# Patient Record
Sex: FEMALE | Race: WHITE | Employment: OTHER | URBAN - METROPOLITAN AREA
[De-identification: names, ages, dates, MRNs, and addresses within clinical notes are randomized per-mention and may not be internally consistent; named-entity substitution may affect disease eponyms.]

---

## 2018-06-25 ENCOUNTER — HOSPITAL ENCOUNTER (OUTPATIENT)
Dept: GENERAL RADIOLOGY | Age: 78
Discharge: HOME | End: 2018-06-27
Payer: MEDICARE

## 2018-06-25 ENCOUNTER — HOSPITAL ENCOUNTER (OUTPATIENT)
Age: 78
Discharge: HOME | End: 2018-06-27
Payer: MEDICARE

## 2018-06-25 DIAGNOSIS — M79.672 LEFT FOOT PAIN: ICD-10-CM

## 2018-06-25 DIAGNOSIS — R60.0 LOCALIZED EDEMA: ICD-10-CM

## 2018-06-25 PROCEDURE — 73610 X-RAY EXAM OF ANKLE: CPT

## 2018-06-25 PROCEDURE — 73630 X-RAY EXAM OF FOOT: CPT

## 2018-11-15 ENCOUNTER — ANESTHESIA (OUTPATIENT)
Dept: OPERATING ROOM | Age: 78
End: 2018-11-15
Payer: MEDICARE

## 2018-11-15 ENCOUNTER — ANESTHESIA EVENT (OUTPATIENT)
Dept: OPERATING ROOM | Age: 78
End: 2018-11-15
Payer: MEDICARE

## 2018-11-15 ENCOUNTER — HOSPITAL ENCOUNTER (OUTPATIENT)
Age: 78
Setting detail: OUTPATIENT SURGERY
Discharge: HOME | End: 2018-11-15
Attending: OPHTHALMOLOGY | Admitting: OPHTHALMOLOGY
Payer: MEDICARE

## 2018-11-15 VITALS
HEIGHT: 65 IN | WEIGHT: 293 LBS | RESPIRATION RATE: 18 BRPM | DIASTOLIC BLOOD PRESSURE: 66 MMHG | HEART RATE: 59 BPM | SYSTOLIC BLOOD PRESSURE: 154 MMHG | OXYGEN SATURATION: 95 % | TEMPERATURE: 97.6 F | BODY MASS INDEX: 48.82 KG/M2

## 2018-11-15 VITALS — OXYGEN SATURATION: 94 % | SYSTOLIC BLOOD PRESSURE: 150 MMHG | DIASTOLIC BLOOD PRESSURE: 73 MMHG

## 2018-11-15 PROCEDURE — 3600000002 HC SURGERY LEVEL 2 BASE: Performed by: OPHTHALMOLOGY

## 2018-11-15 PROCEDURE — 6360000002 HC RX W HCPCS: Performed by: OPHTHALMOLOGY

## 2018-11-15 PROCEDURE — 6360000002 HC RX W HCPCS: Performed by: NURSE ANESTHETIST, CERTIFIED REGISTERED

## 2018-11-15 PROCEDURE — 6370000000 HC RX 637 (ALT 250 FOR IP)

## 2018-11-15 PROCEDURE — 6370000000 HC RX 637 (ALT 250 FOR IP): Performed by: OPHTHALMOLOGY

## 2018-11-15 PROCEDURE — 2709999900 HC NON-CHARGEABLE SUPPLY: Performed by: OPHTHALMOLOGY

## 2018-11-15 PROCEDURE — 2500000003 HC RX 250 WO HCPCS: Performed by: OPHTHALMOLOGY

## 2018-11-15 PROCEDURE — 7100000011 HC PHASE II RECOVERY - ADDTL 15 MIN: Performed by: OPHTHALMOLOGY

## 2018-11-15 PROCEDURE — 3700000001 HC ADD 15 MINUTES (ANESTHESIA): Performed by: OPHTHALMOLOGY

## 2018-11-15 PROCEDURE — 3700000000 HC ANESTHESIA ATTENDED CARE: Performed by: OPHTHALMOLOGY

## 2018-11-15 PROCEDURE — 3600000012 HC SURGERY LEVEL 2 ADDTL 15MIN: Performed by: OPHTHALMOLOGY

## 2018-11-15 PROCEDURE — 7100000010 HC PHASE II RECOVERY - FIRST 15 MIN: Performed by: OPHTHALMOLOGY

## 2018-11-15 PROCEDURE — 2580000003 HC RX 258: Performed by: NURSE ANESTHETIST, CERTIFIED REGISTERED

## 2018-11-15 RX ORDER — PILOCARPINE HYDROCHLORIDE 20 MG/ML
1 SOLUTION/ DROPS OPHTHALMIC EVERY 5 MIN PRN
Status: DISCONTINUED | OUTPATIENT
Start: 2018-11-15 | End: 2018-11-15

## 2018-11-15 RX ORDER — PILOCARPINE HYDROCHLORIDE 10 MG/ML
SOLUTION/ DROPS OPHTHALMIC
Status: COMPLETED
Start: 2018-11-15 | End: 2018-11-15

## 2018-11-15 RX ORDER — SODIUM CHLORIDE 9 MG/ML
INJECTION, SOLUTION INTRAVENOUS CONTINUOUS PRN
Status: DISCONTINUED | OUTPATIENT
Start: 2018-11-15 | End: 2018-11-15 | Stop reason: SDUPTHER

## 2018-11-15 RX ORDER — DEXAMETHASONE SODIUM PHOSPHATE 10 MG/ML
INJECTION INTRAMUSCULAR; INTRAVENOUS PRN
Status: DISCONTINUED | OUTPATIENT
Start: 2018-11-15 | End: 2018-11-15 | Stop reason: HOSPADM

## 2018-11-15 RX ORDER — PILOCARPINE HYDROCHLORIDE 10 MG/ML
1 SOLUTION/ DROPS OPHTHALMIC EVERY 5 MIN PRN
Status: DISCONTINUED | OUTPATIENT
Start: 2018-11-15 | End: 2018-11-15 | Stop reason: HOSPADM

## 2018-11-15 RX ORDER — MIDAZOLAM HYDROCHLORIDE 1 MG/ML
INJECTION INTRAMUSCULAR; INTRAVENOUS PRN
Status: DISCONTINUED | OUTPATIENT
Start: 2018-11-15 | End: 2018-11-15 | Stop reason: SDUPTHER

## 2018-11-15 RX ORDER — PREDNISOLONE ACETATE 10 MG/ML
SUSPENSION/ DROPS OPHTHALMIC PRN
Status: DISCONTINUED | OUTPATIENT
Start: 2018-11-15 | End: 2018-11-15 | Stop reason: HOSPADM

## 2018-11-15 RX ORDER — TETRACAINE HYDROCHLORIDE 5 MG/ML
SOLUTION OPHTHALMIC PRN
Status: DISCONTINUED | OUTPATIENT
Start: 2018-11-15 | End: 2018-11-15 | Stop reason: HOSPADM

## 2018-11-15 RX ORDER — FENTANYL CITRATE 50 UG/ML
INJECTION, SOLUTION INTRAMUSCULAR; INTRAVENOUS PRN
Status: DISCONTINUED | OUTPATIENT
Start: 2018-11-15 | End: 2018-11-15 | Stop reason: SDUPTHER

## 2018-11-15 RX ORDER — TETRACAINE HYDROCHLORIDE 5 MG/ML
1 SOLUTION OPHTHALMIC EVERY 5 MIN PRN
Status: COMPLETED | OUTPATIENT
Start: 2018-11-15 | End: 2018-11-15

## 2018-11-15 RX ORDER — ATROPINE SULFATE 10 MG/ML
SOLUTION/ DROPS OPHTHALMIC PRN
Status: DISCONTINUED | OUTPATIENT
Start: 2018-11-15 | End: 2018-11-15 | Stop reason: HOSPADM

## 2018-11-15 RX ADMIN — Medication 1 DROP: at 12:03

## 2018-11-15 RX ADMIN — SODIUM CHLORIDE: 9 INJECTION, SOLUTION INTRAVENOUS at 12:19

## 2018-11-15 RX ADMIN — Medication 1 DROP: at 12:04

## 2018-11-15 RX ADMIN — Medication: at 11:58

## 2018-11-15 RX ADMIN — MIDAZOLAM HYDROCHLORIDE 2 MG: 1 INJECTION, SOLUTION INTRAMUSCULAR; INTRAVENOUS at 12:38

## 2018-11-15 RX ADMIN — Medication 1 DROP: at 11:58

## 2018-11-15 RX ADMIN — FENTANYL CITRATE 50 MCG: 50 INJECTION, SOLUTION INTRAMUSCULAR; INTRAVENOUS at 12:38

## 2018-11-15 RX ADMIN — Medication 1 DROP: at 12:05

## 2018-11-15 ASSESSMENT — PULMONARY FUNCTION TESTS
PIF_VALUE: 0

## 2018-11-15 ASSESSMENT — PAIN - FUNCTIONAL ASSESSMENT: PAIN_FUNCTIONAL_ASSESSMENT: 0-10

## 2018-11-15 ASSESSMENT — PAIN DESCRIPTION - PAIN TYPE
TYPE: SURGICAL PAIN

## 2018-11-15 ASSESSMENT — PAIN SCALES - GENERAL
PAINLEVEL_OUTOF10: 0

## 2018-11-15 ASSESSMENT — PAIN DESCRIPTION - LOCATION
LOCATION: EYE

## 2018-11-15 ASSESSMENT — PAIN DESCRIPTION - ORIENTATION
ORIENTATION: RIGHT

## 2018-11-15 NOTE — PROGRESS NOTES
Dr Sunshine Justina office called for orders.
products on the day of surgery    [x] If not already done, you can expect a call from registration    [x] You can expect a call the business day prior to procedure to notify you if your arrival time changes    [x] If you receive a survey after surgery we would greatly appreciate your comments    [] Parent/guardian of a minor must accompany their child and remain on the premises  the entire time they are under our care     [] Pediatric patients may bring favorite toy, blanket or comfort item with them    [] A caregiver or family member must remain with the patient during their stay if they are mentally handicapped, have dementia, disoriented or unable to use a call light or would be a safety concern if left unattended    [x] Please notify surgeon if you develop any illness between now and time of surgery (cold, cough, sore throat, fever, nausea, vomiting) or any signs of infections  including skin, wounds, and dental.    [x]  The [facility]patient [facility] is available to fill your prescription here on your day of surgery, ask your preop nurse for details    [] Other instructions  EDUCATIONAL MATERIALS PROVIDED:    [] PAT Preoperative Education Packet/Booklet     [] Medication List    [] Fluoroscopy Information Pamphlet    [] Transfusion bracelet applied with instructions    [] Joint replacement video reviewed    [] Shower with soap, lather and rinse well, and use CHG wipes provided the evening before surgery as instructed

## 2018-11-15 NOTE — ANESTHESIA PRE PROCEDURE
Department of Anesthesiology  Preprocedure Note       Name:  Janie Adair   Age:  66 y.o.  :  1940                                          MRN:  [id number]         Date:  11/15/2018      Surgeon:  Citizen):  Negro To MD    Procedure: RIGHT EYE REVISION OF CATARACT EXTRACTION POSSIBLE TRABECULECTOMY (Right )    Medications prior to admission:   Prior to Admission medications    Medication Sig Start Date End Date Taking? Authorizing Provider   Psyllium (METAMUCIL FIBER PO) Take by mouth 2 times daily   Yes Historical Provider, MD   tamsulosin (FLOMAX) 0.4 MG capsule Take 1 capsule by mouth daily. 6/14/12 11/15/18 Yes Alix Zaman MD   Docusate Sodium (DOCULASE PO) Take 1 tablet by mouth as needed. Yes Historical Provider, MD   amlodipine (NORVASC) 2.5 MG tablet Take 1 tablet by mouth daily. Patient taking differently: Take 5 mg by mouth Daily with lunch  12  Yes Galen Otero DO   metoprolol (TOPROL-XL) 50 MG XL tablet Take 1 tablet by mouth daily. 12  Yes Galen Otero DO   nitroGLYCERIN (NITRODUR) 0.4 MG/HR Place 1 patch onto the skin daily. 12  Yes Galen Otero DO   furosemide (LASIX) 40 MG tablet Take 40 mg by mouth daily. Yes Historical Provider, MD   atorvastatin (LIPITOR) 10 MG tablet Take 10 mg by mouth daily. Yes Historical Provider, MD   oxybutynin (DITROPAN) 5 MG tablet Take 15 mg by mouth daily. Yes Historical Provider, MD   brinzolamide (AZOPT) 1 % ophthalmic suspension Place 1 drop into both eyes 2 times daily. Yes Historical Provider, MD   bimatoprost (LUMIGAN) 0.03 % ophthalmic drops Place 1 drop into both eyes nightly. Yes Historical Provider, MD   aspirin 81 MG EC tablet Take 81 mg by mouth daily Pt instructed to check hold with dr. Dominik Adan Provider, MD   [facility] OIL Take 1,000 mg by mouth daily Ld 2018    Historical Provider, MD   clonidine (CATAPRES) 0.3 MG/24HR Place 1 patch onto the skin once a week.       Historical

## 2018-11-16 NOTE — OP NOTE
[address] [address]                                OPERATIVE REPORT    PATIENT NAME: Genia Dorado                  :        1940  MED REC NO:   [id number]                            ROOM:  ACCOUNT NO:   [de-identified]                           ADMIT DATE: 11/15/2018  PROVIDER:     Alanis Cain MD    DATE OF PROCEDURE:  11/15/2018    PREOPERATIVE DIAGNOSES:  1.  Scarred and failed Xen stent implant, right eye. 2.  Pseudophakia. 3.  Primary open-angle glaucoma, right eye, uncontrolled with maximal  medical therapy and severe with risk of blindness. POSTOPERATIVE DIAGNOSES:  1.  Scarred and failed Xen stent implant, right eye. 2.  Pseudophakia. 3.  Primary open-angle glaucoma, right eye, uncontrolled with maximal  medical therapy and severe with risk of blindness. PROCEDURE PERFORMED:  1. Revision of Xen stent, right eye. 2.  Trabeculectomy and mitomycin-C, right eye. ANESTHESIA:  Local MAC. COMPLICATIONS:  None. SURGEON:  Alanis Cain MD.    INDICATION FOR PROCEDURE:  The patient is a very pleasant 51-year-old  female with truly end-stage glaucoma. She has pressures in the high 30s  on maximal medical therapy and with risks, benefits, and alternatives  discussed with her, she wished to proceed with the above procedure. DESCRIPTION OF OPERATION:  The patient was brought to the operating room  and placed in the supine position on the operating table. She was  administered 2 mg of Versed and was given a retrobulbar nerve block to  the right eye consisting of a 50:50 mixture of 0.75% Marcaine and 2%  lidocaine. She was prepped and draped in sterile fashion for  intraocular surgery. Attention was directed to the right eye. A lid  speculum was placed and operating microscope was brought into view.     A 6-0 Vicryl traction suture was placed through peripheral superior  clear cornea and secured to

## 2019-03-15 ENCOUNTER — APPOINTMENT (OUTPATIENT)
Dept: GENERAL RADIOLOGY | Age: 79
End: 2019-03-15
Payer: MEDICARE

## 2019-03-15 ENCOUNTER — HOSPITAL ENCOUNTER (EMERGENCY)
Age: 79
Discharge: HOME | End: 2019-03-15
Attending: EMERGENCY MEDICINE
Payer: MEDICARE

## 2019-03-15 VITALS
RESPIRATION RATE: 18 BRPM | HEIGHT: 65 IN | DIASTOLIC BLOOD PRESSURE: 70 MMHG | OXYGEN SATURATION: 96 % | TEMPERATURE: 98.1 F | SYSTOLIC BLOOD PRESSURE: 145 MMHG | WEIGHT: 293 LBS | HEART RATE: 88 BPM | BODY MASS INDEX: 48.82 KG/M2

## 2019-03-15 DIAGNOSIS — M79.661 RIGHT CALF PAIN: ICD-10-CM
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
## 2019-03-15 PROCEDURE — 73590 X-RAY EXAM OF LOWER LEG: CPT

## 2019-03-15 PROCEDURE — 99284 EMERGENCY DEPT VISIT MOD MDM: CPT

## 2019-03-15 PROCEDURE — 6370000000 HC RX 637 (ALT 250 FOR IP): Performed by: EMERGENCY MEDICINE

## 2019-03-15 RX ORDER — ACETAMINOPHEN 500 MG
1000 TABLET ORAL ONCE
Status: COMPLETED | OUTPATIENT
Start: 2019-03-15 | End: 2019-03-15

## 2019-03-15 RX ADMIN — ACETAMINOPHEN 1000 MG: 500 TABLET ORAL at 18:34

## 2019-03-15 ASSESSMENT — PAIN SCALES - GENERAL
PAINLEVEL_OUTOF10: 10
PAINLEVEL_OUTOF10: 8
PAINLEVEL_OUTOF10: 8

## 2019-03-15 ASSESSMENT — PAIN - FUNCTIONAL ASSESSMENT: PAIN_FUNCTIONAL_ASSESSMENT: PREVENTS OR INTERFERES SOME ACTIVE ACTIVITIES AND ADLS

## 2019-03-15 ASSESSMENT — PAIN DESCRIPTION - DESCRIPTORS: DESCRIPTORS: ACHING;THROBBING

## 2019-03-15 ASSESSMENT — PAIN DESCRIPTION - FREQUENCY: FREQUENCY: CONTINUOUS

## 2019-03-15 ASSESSMENT — PAIN DESCRIPTION - ORIENTATION: ORIENTATION: RIGHT

## 2019-03-15 ASSESSMENT — PAIN DESCRIPTION - LOCATION: LOCATION: KNEE

## 2019-04-12 ENCOUNTER — APPOINTMENT (OUTPATIENT)
Dept: CT IMAGING | Age: 79
DRG: 871 | End: 2019-04-12
Payer: MEDICARE

## 2019-04-12 ENCOUNTER — APPOINTMENT (OUTPATIENT)
Dept: GENERAL RADIOLOGY | Age: 79
DRG: 871 | End: 2019-04-12
Payer: MEDICARE

## 2019-04-12 ENCOUNTER — HOSPITAL ENCOUNTER (INPATIENT)
Age: 79
LOS: 11 days | Discharge: OTHER INSTITUTION | DRG: 871 | End: 2019-04-23
Attending: EMERGENCY MEDICINE | Admitting: FAMILY MEDICINE
Payer: MEDICARE

## 2019-04-12 ENCOUNTER — APPOINTMENT (OUTPATIENT)
Dept: ULTRASOUND IMAGING | Age: 79
DRG: 871 | End: 2019-04-12
Payer: MEDICARE

## 2019-04-12 DIAGNOSIS — R53.1 GENERALIZED WEAKNESS: ICD-10-CM

## 2019-04-12 DIAGNOSIS — E86.0 DEHYDRATION: ICD-10-CM

## 2019-04-12 DIAGNOSIS — J18.9 PNEUMONIA DUE TO ORGANISM: Primary | ICD-10-CM

## 2019-04-12 LAB
ALBUMIN SERPL-MCNC: 3.8 G/DL (ref 3.5–5.2)
ALP BLD-CCNC: 127 U/L (ref 35–104)
ALT SERPL-CCNC: 6 U/L (ref 0–32)
ANION GAP SERPL CALCULATED.3IONS-SCNC: 11 MMOL/L (ref 7–16)
AST SERPL-CCNC: 16 U/L (ref 0–31)
BASOPHILS ABSOLUTE: 0.04 E9/L (ref 0–0.2)
BASOPHILS RELATIVE PERCENT: 0.4 % (ref 0–2)
BILIRUB SERPL-MCNC: 1.8 MG/DL (ref 0–1.2)
BUN BLDV-MCNC: 12 MG/DL (ref 8–23)
CALCIUM SERPL-MCNC: 9.8 MG/DL (ref 8.6–10.2)
CHLORIDE BLD-SCNC: 109 MMOL/L (ref 98–107)
CO2: 27 MMOL/L (ref 22–29)
CREAT SERPL-MCNC: 0.8 MG/DL (ref 0.5–1)
EOSINOPHILS ABSOLUTE: 0.09 E9/L (ref 0.05–0.5)
EOSINOPHILS RELATIVE PERCENT: 0.8 % (ref 0–6)
GFR AFRICAN AMERICAN: >60
GFR NON-AFRICAN AMERICAN: >60 ML/MIN/1.73
GLUCOSE BLD-MCNC: 121 MG/DL (ref 74–99)
HCT VFR BLD CALC: 44 % (ref 34–48)
HEMOGLOBIN: 14.3 G/DL (ref 11.5–15.5)
IMMATURE GRANULOCYTES #: 0.05 E9/L
IMMATURE GRANULOCYTES %: 0.5 % (ref 0–5)
INFLUENZA A BY PCR: NOT DETECTED
INFLUENZA B BY PCR: NOT DETECTED
LACTIC ACID: 1.4 MMOL/L (ref 0.5–2.2)
LIPASE: 23 U/L (ref 13–60)
LYMPHOCYTES ABSOLUTE: 0.81 E9/L (ref 1.5–4)
LYMPHOCYTES RELATIVE PERCENT: 7.5 % (ref 20–42)
MAGNESIUM: 1.8 MG/DL (ref 1.6–2.6)
MCH RBC QN AUTO: 29.3 PG (ref 26–35)
MCHC RBC AUTO-ENTMCNC: 32.5 % (ref 32–34.5)
MCV RBC AUTO: 90.2 FL (ref 80–99.9)
MONOCYTES ABSOLUTE: 0.85 E9/L (ref 0.1–0.95)
MONOCYTES RELATIVE PERCENT: 7.9 % (ref 2–12)
NEUTROPHILS ABSOLUTE: 8.9 E9/L (ref 1.8–7.3)
NEUTROPHILS RELATIVE PERCENT: 82.9 % (ref 43–80)
PDW BLD-RTO: 14.2 FL (ref 11.5–15)
PLATELET # BLD: 261 E9/L (ref 130–450)
PMV BLD AUTO: 10.4 FL (ref 7–12)
POTASSIUM SERPL-SCNC: 4 MMOL/L (ref 3.5–5)
RBC # BLD: 4.88 E12/L (ref 3.5–5.5)
SODIUM BLD-SCNC: 147 MMOL/L (ref 132–146)
TOTAL PROTEIN: 6.7 G/DL (ref 6.4–8.3)
TROPONIN: <0.01 NG/ML (ref 0–0.03)
WBC # BLD: 10.7 E9/L (ref 4.5–11.5)

## 2019-04-12 PROCEDURE — 84484 ASSAY OF TROPONIN QUANT: CPT

## 2019-04-12 PROCEDURE — 87077 CULTURE AEROBIC IDENTIFY: CPT

## 2019-04-12 PROCEDURE — 2580000003 HC RX 258: Performed by: FAMILY MEDICINE

## 2019-04-12 PROCEDURE — 83690 ASSAY OF LIPASE: CPT

## 2019-04-12 PROCEDURE — 80053 COMPREHEN METABOLIC PANEL: CPT

## 2019-04-12 PROCEDURE — 1200000000 HC SEMI PRIVATE

## 2019-04-12 PROCEDURE — C9113 INJ PANTOPRAZOLE SODIUM, VIA: HCPCS | Performed by: FAMILY MEDICINE

## 2019-04-12 PROCEDURE — 71045 X-RAY EXAM CHEST 1 VIEW: CPT

## 2019-04-12 PROCEDURE — 6360000002 HC RX W HCPCS: Performed by: EMERGENCY MEDICINE

## 2019-04-12 PROCEDURE — 93971 EXTREMITY STUDY: CPT

## 2019-04-12 PROCEDURE — 71270 CT THORAX DX C-/C+: CPT

## 2019-04-12 PROCEDURE — 87040 BLOOD CULTURE FOR BACTERIA: CPT

## 2019-04-12 PROCEDURE — 83735 ASSAY OF MAGNESIUM: CPT

## 2019-04-12 PROCEDURE — 87149 DNA/RNA DIRECT PROBE: CPT

## 2019-04-12 PROCEDURE — 87186 SC STD MICRODIL/AGAR DIL: CPT

## 2019-04-12 PROCEDURE — 6360000004 HC RX CONTRAST MEDICATION: Performed by: RADIOLOGY

## 2019-04-12 PROCEDURE — 83605 ASSAY OF LACTIC ACID: CPT

## 2019-04-12 PROCEDURE — 6370000000 HC RX 637 (ALT 250 FOR IP): Performed by: FAMILY MEDICINE

## 2019-04-12 PROCEDURE — 6360000002 HC RX W HCPCS: Performed by: FAMILY MEDICINE

## 2019-04-12 PROCEDURE — 87502 INFLUENZA DNA AMP PROBE: CPT

## 2019-04-12 PROCEDURE — 99285 EMERGENCY DEPT VISIT HI MDM: CPT

## 2019-04-12 PROCEDURE — 85025 COMPLETE CBC W/AUTO DIFF WBC: CPT

## 2019-04-12 PROCEDURE — 93005 ELECTROCARDIOGRAM TRACING: CPT | Performed by: EMERGENCY MEDICINE

## 2019-04-12 PROCEDURE — 2580000003 HC RX 258: Performed by: EMERGENCY MEDICINE

## 2019-04-12 PROCEDURE — 36415 COLL VENOUS BLD VENIPUNCTURE: CPT

## 2019-04-12 RX ORDER — CLONIDINE 0.3 MG/24H
1 PATCH, EXTENDED RELEASE TRANSDERMAL WEEKLY
Status: DISCONTINUED | OUTPATIENT
Start: 2019-04-13 | End: 2019-04-14

## 2019-04-12 RX ORDER — CLOPIDOGREL BISULFATE 75 MG/1
75 TABLET ORAL DAILY
Status: DISCONTINUED | OUTPATIENT
Start: 2019-04-12 | End: 2019-04-23 | Stop reason: HOSPADM

## 2019-04-12 RX ORDER — ONDANSETRON 2 MG/ML
4 INJECTION INTRAMUSCULAR; INTRAVENOUS EVERY 6 HOURS PRN
Status: DISCONTINUED | OUTPATIENT
Start: 2019-04-12 | End: 2019-04-23 | Stop reason: HOSPADM

## 2019-04-12 RX ORDER — ALBUTEROL SULFATE 2.5 MG/3ML
2.5 SOLUTION RESPIRATORY (INHALATION) 3 TIMES DAILY
Status: DISCONTINUED | OUTPATIENT
Start: 2019-04-12 | End: 2019-04-18

## 2019-04-12 RX ORDER — OXYBUTYNIN CHLORIDE 5 MG/1
5 TABLET ORAL 2 TIMES DAILY
Status: DISCONTINUED | OUTPATIENT
Start: 2019-04-12 | End: 2019-04-23 | Stop reason: HOSPADM

## 2019-04-12 RX ORDER — PAROXETINE 10 MG/1
10 TABLET, FILM COATED ORAL DAILY
Status: DISCONTINUED | OUTPATIENT
Start: 2019-04-13 | End: 2019-04-23 | Stop reason: HOSPADM

## 2019-04-12 RX ORDER — BIMATOPROST 0.3 MG/ML
1 SOLUTION/ DROPS OPHTHALMIC NIGHTLY
Status: CANCELLED | OUTPATIENT
Start: 2019-04-12

## 2019-04-12 RX ORDER — SODIUM CHLORIDE 9 MG/ML
INJECTION, SOLUTION INTRAVENOUS CONTINUOUS
Status: DISCONTINUED | OUTPATIENT
Start: 2019-04-12 | End: 2019-04-13

## 2019-04-12 RX ORDER — LEVOTHYROXINE SODIUM 88 UG/1
88 TABLET ORAL DAILY
Status: DISCONTINUED | OUTPATIENT
Start: 2019-04-13 | End: 2019-04-23 | Stop reason: HOSPADM

## 2019-04-12 RX ORDER — BRINZOLAMIDE 10 MG/ML
1 SUSPENSION/ DROPS OPHTHALMIC 2 TIMES DAILY
Status: DISCONTINUED | OUTPATIENT
Start: 2019-04-12 | End: 2019-04-12

## 2019-04-12 RX ORDER — 0.9 % SODIUM CHLORIDE 0.9 %
1000 INTRAVENOUS SOLUTION INTRAVENOUS ONCE
Status: COMPLETED | OUTPATIENT
Start: 2019-04-12 | End: 2019-04-12

## 2019-04-12 RX ORDER — METOPROLOL SUCCINATE 25 MG/1
50 TABLET, EXTENDED RELEASE ORAL DAILY
Status: DISCONTINUED | OUTPATIENT
Start: 2019-04-12 | End: 2019-04-23 | Stop reason: HOSPADM

## 2019-04-12 RX ORDER — ATORVASTATIN CALCIUM 10 MG/1
10 TABLET, FILM COATED ORAL NIGHTLY
Status: DISCONTINUED | OUTPATIENT
Start: 2019-04-12 | End: 2019-04-23 | Stop reason: HOSPADM

## 2019-04-12 RX ORDER — BACITRACIN ZINC AND POLYMYXIN B SULFATE 500; 1000 [USP'U]/G; [USP'U]/G
OINTMENT TOPICAL 2 TIMES DAILY
Status: DISCONTINUED | OUTPATIENT
Start: 2019-04-12 | End: 2019-04-15

## 2019-04-12 RX ORDER — PANTOPRAZOLE SODIUM 40 MG/10ML
40 INJECTION, POWDER, LYOPHILIZED, FOR SOLUTION INTRAVENOUS DAILY
Status: DISCONTINUED | OUTPATIENT
Start: 2019-04-12 | End: 2019-04-17

## 2019-04-12 RX ORDER — BRINZOLAMIDE 10 MG/ML
1 SUSPENSION/ DROPS OPHTHALMIC 2 TIMES DAILY
Status: CANCELLED | OUTPATIENT
Start: 2019-04-12

## 2019-04-12 RX ORDER — AMLODIPINE BESYLATE 5 MG/1
10 TABLET ORAL DAILY
Status: DISCONTINUED | OUTPATIENT
Start: 2019-04-12 | End: 2019-04-23 | Stop reason: HOSPADM

## 2019-04-12 RX ORDER — 0.9 % SODIUM CHLORIDE 0.9 %
10 VIAL (ML) INJECTION DAILY
Status: DISCONTINUED | OUTPATIENT
Start: 2019-04-12 | End: 2019-04-17

## 2019-04-12 RX ORDER — BIMATOPROST 0.3 MG/ML
1 SOLUTION/ DROPS OPHTHALMIC NIGHTLY
Status: DISCONTINUED | OUTPATIENT
Start: 2019-04-12 | End: 2019-04-12

## 2019-04-12 RX ORDER — NITROGLYCERIN 80 MG/1
1 PATCH TRANSDERMAL DAILY
Status: DISCONTINUED | OUTPATIENT
Start: 2019-04-13 | End: 2019-04-23 | Stop reason: HOSPADM

## 2019-04-12 RX ADMIN — SODIUM CHLORIDE 1000 ML: 9 INJECTION, SOLUTION INTRAVENOUS at 16:35

## 2019-04-12 RX ADMIN — AZITHROMYCIN 500 MG: 500 INJECTION, POWDER, LYOPHILIZED, FOR SOLUTION INTRAVENOUS at 20:32

## 2019-04-12 RX ADMIN — Medication 10 ML: at 22:18

## 2019-04-12 RX ADMIN — WATER 2 G: 1 INJECTION INTRAMUSCULAR; INTRAVENOUS; SUBCUTANEOUS at 20:21

## 2019-04-12 RX ADMIN — BACITRACIN ZINC AND POLYMYXIN B SULFATE: at 23:25

## 2019-04-12 RX ADMIN — PANTOPRAZOLE SODIUM 40 MG: 40 INJECTION, POWDER, FOR SOLUTION INTRAVENOUS at 22:17

## 2019-04-12 RX ADMIN — OXYBUTYNIN CHLORIDE 5 MG: 5 TABLET ORAL at 23:27

## 2019-04-12 RX ADMIN — SODIUM CHLORIDE: 9 INJECTION, SOLUTION INTRAVENOUS at 22:16

## 2019-04-12 RX ADMIN — IOPAMIDOL 80 ML: 755 INJECTION, SOLUTION INTRAVENOUS at 19:36

## 2019-04-12 RX ADMIN — CLOPIDOGREL BISULFATE 75 MG: 75 TABLET ORAL at 22:17

## 2019-04-12 RX ADMIN — AMLODIPINE BESYLATE 10 MG: 5 TABLET ORAL at 22:17

## 2019-04-12 RX ADMIN — ATORVASTATIN CALCIUM 10 MG: 10 TABLET, FILM COATED ORAL at 22:17

## 2019-04-12 ASSESSMENT — ENCOUNTER SYMPTOMS
SINUS PRESSURE: 0
COUGH: 0
SORE THROAT: 0
WHEEZING: 0
BACK PAIN: 0
ABDOMINAL DISTENTION: 0
CHEST TIGHTNESS: 0
NAUSEA: 1
DIARRHEA: 1
SHORTNESS OF BREATH: 0
ABDOMINAL PAIN: 0
VOMITING: 1

## 2019-04-12 ASSESSMENT — PAIN SCALES - GENERAL: PAINLEVEL_OUTOF10: 0

## 2019-04-12 NOTE — ED PROVIDER NOTES
Chief complaint:  Fatigue    HPI history provided by the patient and spouse    Patient comes in complaining of general fatigue and weakness. She states she was exposed to influenza the other day and had several days of poor appetite, aches and chills with nausea, vomiting and diarrhea. Not able to eat or drink anything as it made the symptoms worse. She states that the symptoms have gradually improved, she still has a mild amount of residual diarrhea but the vomiting has improved, actually resolved. No cough or congestion. No sore throat. No shortness of breath. No chest pain. No abdominal pain. No neck pain or stiff neck. No headache. No focal extremity weakness. Patient copies of just general malaise, fatigue and weakness. No treatment prior to arrival. Still having trouble eating and drinking as she still has some nausea but no vomiting and still mild diarrhea. Review of Systems   Constitutional: Positive for fatigue. Negative for chills, diaphoresis and fever. HENT: Negative for congestion, sinus pressure and sore throat. Respiratory: Negative for cough, chest tightness, shortness of breath and wheezing. Cardiovascular: Negative for chest pain, palpitations and leg swelling. Gastrointestinal: Positive for diarrhea, nausea and vomiting. Negative for abdominal distention and abdominal pain. Genitourinary: Negative for dysuria, flank pain and frequency. Musculoskeletal: Negative for arthralgias, back pain, gait problem, joint swelling, myalgias, neck pain and neck stiffness. Skin: Negative for rash and wound. Neurological: Negative for dizziness, syncope, weakness, light-headedness, numbness and headaches. Hematological: Negative for adenopathy. All other systems reviewed and are negative. Physical Exam   Constitutional: She is oriented to person, place, and time. She appears well-developed and well-nourished. Non-toxic appearance. She does not have a sickly appearance.  She does not appear ill. No distress. HENT:   Head: Normocephalic and atraumatic. Mouth/Throat: Mucous membranes are dry. Eyes: Pupils are equal, round, and reactive to light. Neck: Normal range of motion. Neck supple. No tracheal tenderness, no spinous process tenderness and no muscular tenderness present. No neck rigidity. No tracheal deviation, no edema, no erythema and normal range of motion present. Cardiovascular: Normal rate, regular rhythm and normal heart sounds. No murmur heard. Pulmonary/Chest: Effort normal and breath sounds normal. No stridor. No respiratory distress. She has no decreased breath sounds. She has no wheezes. She has no rhonchi. She has no rales. She exhibits no tenderness. Abdominal: Soft. Normal appearance and bowel sounds are normal. She exhibits no ascites and no pulsatile midline mass. There is no tenderness. There is no rigidity, no rebound, no guarding and no CVA tenderness. Musculoskeletal: She exhibits no edema, tenderness or deformity. There is no pretibial edema nor calf tenderness bilaterally       Neurological: She is alert and oriented to person, place, and time. She is not disoriented. She displays no atrophy, no tremor and normal reflexes. No cranial nerve deficit or sensory deficit. She exhibits normal muscle tone. She displays no seizure activity. Coordination normal. GCS eye subscore is 4. GCS verbal subscore is 5. GCS motor subscore is 6. No focal neuro deficits     Skin: Skin is warm and dry. No petechiae, no purpura and no rash noted. She is not diaphoretic. No pallor. Nursing note and vitals reviewed. Procedures    MDM    ED Course as of Apr 12 1732 Fri Apr 12, 2019   1732 Patient laying the bed resting comfortable in no distress. No new complaints. Exam unchanged. Updated on workup results.     [NC]   2781 Case discussed with Dr. Lina Patrick, detailed overview given, he knows this patient well and will admit her.    [NC]      ED Course User Index  [NC]

## 2019-04-12 NOTE — LETTER
Beneficiary Notification Letter     This Baljit Valdovinos Provider is Participating in an Innovative Payment and [address] [place] from Medicare     Greetings:   Corrina Kong is participating in a Medicare initiative called the [facility] for [address] [facility]. You are receiving this letter because your health care provider has identified you as a patient who is receiving care through this initiative. Health care providers participating in the [facility] [address] [facility], including Corrina Kong, will work with Medicare to improve care for patients. Your Medicare rights have not been changed. You still have all the same Medicare rights and protections, including the right to choose which hospital, doctor, or other health care provider you see. However, because Corrina Kong chose to participate in the [address] [place] [address], all Medicare beneficiaries who meet the eligibility criteria of this initiative will receive care under the initiative. If you do not wish to receive care under the Bundled Payments [address], you must choose a health care provider that does not participate in this initiative for your care. Regardless of which health care provider you see, Medicare will continue to cover all of your medically necessary services. Bundled Payments for Care Improvement Advanced aims to help improve your care     The Bundled Payments [place] [address] [facility] is an innovative Medicare initiative that encourages your doctors, hospitals, and other health care providers to work more closely together so you get better care during and following certain hospital stays.  In this initiative, doctors and hospitals may work closely with certain health care providers and suppliers that help patients recover after discharge from the hospital, including skilled nursing facilities, home health agencies, inpatient rehabilitation facilities, and long term care hospitals. Corrina [employer] is working closely with the doctors and other health care providers that care for you during and following your hospital stay and for a period of time after you leave the hospital. By working together, the health care providers are trying to more efficiently provide well-managed, high quality, patient-centered care as you undergo treatment. Hospitals, doctors, and other health care providers that care for you following a hospital stay may receive an additional payment for providing better, more coordinated health care. Medicare will monitor your care to make sure you and others get high quality care. Your feedback is important     Medicare may also ask you to answer a survey about the services and care you received from [facility] will be mailed to you. Your feedback will improve care for all people with Medicare who receive care from [facility]. Completion of this survey is optional.     Get more information     For more information about the Bundled Payments for [address] [place] [address], you can:    · Visit the CMS BPCI Advanced Website at http://meyer-rice.net/ initiatives/bpci-advanced   · Call the [facility] BPCI-A team at (319) [phone]. · Call 1-800-MEDICARE (4-730.999.4437). TTY users can call 7-398.779.9961     If you have concerns or complaints about your care, talk to your health care provider, or contact your Beneficiary and Family Centered Quality Improvement Organization [facility]). To get your CC-QIO's phone number, visit Medicare.gov/contacts or call 1-800-MEDICARE. · To find a different hospital, visit www. hospitalcompare.[facility].gov or call 1-800- MEDICARE (6-124.972.8088). TTY users should call 2-918.473.1997. · To find a different doctor, visit Medicare's Physician Compare website, [employer].co.nz, or call 1-800-MEDICARE (695 6304). TTY users should call 3-770.539.9915. · To find a different skilled nursing facility, visit [employer] website, https://www.Harry [employer]/, or call 1-800-MEDICARE (1- 658.109.9087). TTY users should call 5-503.744.8500. · To find a different long term care hospital, visit [facility] [address] Compare website, [facility].Takes, or call 1-800- MEDICARE (140 7138). TTY users should call [phone]. · To find a different inpatient rehabilitation facility, visit [address] [facility] [address] Compare website, www.medicare.gov/ inpatientrehabilitation facilitycompare, or call 1-800-MEDICARE (1-446-608-094-150-8398). TTY users should call 2- [phone]. · To find a different home health agency, visit [address] website, www.medicare.gov/homehealthcompare, or call 1-800-MEDICARE (7-363- 116-6050). TTY users should call [phone].

## 2019-04-12 NOTE — PROGRESS NOTES
ecchymosis right posterior calf with firm edema, open crescent wound right upper posterolateral calf, distal extremity fxn normal with 5/5 strength. Oropharynx dry, hyperemic. Lungs decreased breath sounds, nonlabored repirations, scattered bronchial adv noise. Heart tones fair, occas early beat, HR 60/min, no S3S4. Abd obese with active bowel sounds, no rebound/rigidity, no area of localized pain. Assessment/Plan:   1. Dehydration, viral pneumonitis ?, UTI ? 2. Contusion right leg, open wound, r/o DVT, mobility impairment since injury  3. Supermorbid obesity  4. Essential hypertension  5. Hypothyroidism on supplementation  6. Hypercholesterolemia on statin  7.  Anorexia undetermined etiology, GERD, hx diverticulosis  Discussed with the patient and her spouse, IVF support and empiric PPI IV, empiric atb pending cultures, A & B influenza testing negative, check right leg US, first aid to wound, PT/OT assessment  Electronically by Yanira Kearns DO  on 4/12/19 at 6:36 PM

## 2019-04-13 PROBLEM — E44.0 MODERATE PROTEIN-CALORIE MALNUTRITION (HCC): Chronic | Status: ACTIVE | Noted: 2019-04-13

## 2019-04-13 LAB
ALBUMIN SERPL-MCNC: 3.3 G/DL (ref 3.5–5.2)
ALP BLD-CCNC: 100 U/L (ref 35–104)
ALT SERPL-CCNC: 5 U/L (ref 0–32)
ANION GAP SERPL CALCULATED.3IONS-SCNC: 12 MMOL/L (ref 7–16)
AST SERPL-CCNC: 12 U/L (ref 0–31)
BACTERIA: ABNORMAL /HPF
BASOPHILS ABSOLUTE: 0.03 E9/L (ref 0–0.2)
BASOPHILS RELATIVE PERCENT: 0.4 % (ref 0–2)
BILIRUB SERPL-MCNC: 1.4 MG/DL (ref 0–1.2)
BILIRUBIN DIRECT: 0.4 MG/DL (ref 0–0.3)
BILIRUBIN URINE: NEGATIVE
BILIRUBIN, INDIRECT: 1 MG/DL (ref 0–1)
BLOOD, URINE: ABNORMAL
BUN BLDV-MCNC: 10 MG/DL (ref 8–23)
CALCIUM SERPL-MCNC: 9.1 MG/DL (ref 8.6–10.2)
CHLORIDE BLD-SCNC: 109 MMOL/L (ref 98–107)
CLARITY: ABNORMAL
CO2: 24 MMOL/L (ref 22–29)
COLOR: ABNORMAL
CREAT SERPL-MCNC: 0.8 MG/DL (ref 0.5–1)
EOSINOPHILS ABSOLUTE: 0.17 E9/L (ref 0.05–0.5)
EOSINOPHILS RELATIVE PERCENT: 2.5 % (ref 0–6)
EPITHELIAL CELLS, UA: ABNORMAL /HPF
FILM ARRAY ADENOVIRUS: NORMAL
FILM ARRAY BORDETELLA PERTUSSIS: NORMAL
FILM ARRAY CHLAMYDOPHILIA PNEUMONIAE: NORMAL
FILM ARRAY CORONAVIRUS 229E: NORMAL
FILM ARRAY CORONAVIRUS HKU1: NORMAL
FILM ARRAY CORONAVIRUS NL63: NORMAL
FILM ARRAY CORONAVIRUS OC43: NORMAL
FILM ARRAY INFLUENZA A VIRUS 09H1: NORMAL
FILM ARRAY INFLUENZA A VIRUS H1: NORMAL
FILM ARRAY INFLUENZA A VIRUS H3: NORMAL
FILM ARRAY INFLUENZA A VIRUS: NORMAL
FILM ARRAY INFLUENZA B: NORMAL
FILM ARRAY METAPNEUMOVIRUS: NORMAL
FILM ARRAY MYCOPLASMA PNEUMONIAE: NORMAL
FILM ARRAY PARAINFLUENZA VIRUS 1: NORMAL
FILM ARRAY PARAINFLUENZA VIRUS 2: NORMAL
FILM ARRAY PARAINFLUENZA VIRUS 3: NORMAL
FILM ARRAY PARAINFLUENZA VIRUS 4: NORMAL
FILM ARRAY RESPIRATORY SYNCITIAL VIRUS: NORMAL
FILM ARRAY RHINOVIRUS/ENTEROVIRUS: NORMAL
GAMMA GLUTAMYL TRANSFERASE: 19 U/L (ref 6–42)
GFR AFRICAN AMERICAN: >60
GFR NON-AFRICAN AMERICAN: >60 ML/MIN/1.73
GLUCOSE BLD-MCNC: 97 MG/DL (ref 74–99)
GLUCOSE URINE: NEGATIVE MG/DL
HCT VFR BLD CALC: 37.6 % (ref 34–48)
HEMOGLOBIN: 12.3 G/DL (ref 11.5–15.5)
IMMATURE GRANULOCYTES #: 0.03 E9/L
IMMATURE GRANULOCYTES %: 0.4 % (ref 0–5)
KETONES, URINE: NEGATIVE MG/DL
LEUKOCYTE ESTERASE, URINE: ABNORMAL
LYMPHOCYTES ABSOLUTE: 1.35 E9/L (ref 1.5–4)
LYMPHOCYTES RELATIVE PERCENT: 19.5 % (ref 20–42)
MCH RBC QN AUTO: 29.4 PG (ref 26–35)
MCHC RBC AUTO-ENTMCNC: 32.7 % (ref 32–34.5)
MCV RBC AUTO: 90 FL (ref 80–99.9)
MONOCYTES ABSOLUTE: 0.95 E9/L (ref 0.1–0.95)
MONOCYTES RELATIVE PERCENT: 13.7 % (ref 2–12)
NEUTROPHILS ABSOLUTE: 4.4 E9/L (ref 1.8–7.3)
NEUTROPHILS RELATIVE PERCENT: 63.5 % (ref 43–80)
NITRITE, URINE: NEGATIVE
PDW BLD-RTO: 14.4 FL (ref 11.5–15)
PH UA: 6.5 (ref 5–9)
PLATELET # BLD: 238 E9/L (ref 130–450)
PMV BLD AUTO: 10.5 FL (ref 7–12)
POTASSIUM SERPL-SCNC: 3.1 MMOL/L (ref 3.5–5)
PROTEIN UA: ABNORMAL MG/DL
RBC # BLD: 4.18 E12/L (ref 3.5–5.5)
RBC UA: ABNORMAL /HPF (ref 0–2)
SODIUM BLD-SCNC: 145 MMOL/L (ref 132–146)
SPECIFIC GRAVITY UA: <=1.005 (ref 1–1.03)
TOTAL PROTEIN: 5.9 G/DL (ref 6.4–8.3)
TSH SERPL DL<=0.05 MIU/L-ACNC: 1.99 UIU/ML (ref 0.27–4.2)
UROBILINOGEN, URINE: 1 E.U./DL
WBC # BLD: 6.9 E9/L (ref 4.5–11.5)
WBC UA: >20 /HPF (ref 0–5)
YEAST: ABNORMAL

## 2019-04-13 PROCEDURE — 87581 M.PNEUMON DNA AMP PROBE: CPT

## 2019-04-13 PROCEDURE — C9113 INJ PANTOPRAZOLE SODIUM, VIA: HCPCS | Performed by: FAMILY MEDICINE

## 2019-04-13 PROCEDURE — 80048 BASIC METABOLIC PNL TOTAL CA: CPT

## 2019-04-13 PROCEDURE — 1200000000 HC SEMI PRIVATE

## 2019-04-13 PROCEDURE — 87088 URINE BACTERIA CULTURE: CPT

## 2019-04-13 PROCEDURE — 84443 ASSAY THYROID STIM HORMONE: CPT

## 2019-04-13 PROCEDURE — 6360000002 HC RX W HCPCS: Performed by: FAMILY MEDICINE

## 2019-04-13 PROCEDURE — 87798 DETECT AGENT NOS DNA AMP: CPT

## 2019-04-13 PROCEDURE — 6370000000 HC RX 637 (ALT 250 FOR IP): Performed by: FAMILY MEDICINE

## 2019-04-13 PROCEDURE — 85025 COMPLETE CBC W/AUTO DIFF WBC: CPT

## 2019-04-13 PROCEDURE — 94640 AIRWAY INHALATION TREATMENT: CPT

## 2019-04-13 PROCEDURE — 99221 1ST HOSP IP/OBS SF/LOW 40: CPT | Performed by: SURGERY

## 2019-04-13 PROCEDURE — 82977 ASSAY OF GGT: CPT

## 2019-04-13 PROCEDURE — 36415 COLL VENOUS BLD VENIPUNCTURE: CPT

## 2019-04-13 PROCEDURE — 2580000003 HC RX 258: Performed by: FAMILY MEDICINE

## 2019-04-13 PROCEDURE — 81001 URINALYSIS AUTO W/SCOPE: CPT

## 2019-04-13 PROCEDURE — 87633 RESP VIRUS 12-25 TARGETS: CPT

## 2019-04-13 PROCEDURE — 97530 THERAPEUTIC ACTIVITIES: CPT | Performed by: PHYSICAL THERAPIST

## 2019-04-13 PROCEDURE — 80076 HEPATIC FUNCTION PANEL: CPT

## 2019-04-13 PROCEDURE — 87486 CHLMYD PNEUM DNA AMP PROBE: CPT

## 2019-04-13 PROCEDURE — 97162 PT EVAL MOD COMPLEX 30 MIN: CPT | Performed by: PHYSICAL THERAPIST

## 2019-04-13 RX ORDER — POTASSIUM CHLORIDE AND SODIUM CHLORIDE 450; 150 MG/100ML; MG/100ML
INJECTION, SOLUTION INTRAVENOUS CONTINUOUS
Status: DISCONTINUED | OUTPATIENT
Start: 2019-04-13 | End: 2019-04-15

## 2019-04-13 RX ADMIN — ATORVASTATIN CALCIUM 10 MG: 10 TABLET, FILM COATED ORAL at 22:05

## 2019-04-13 RX ADMIN — WATER 1 G: 1 INJECTION INTRAMUSCULAR; INTRAVENOUS; SUBCUTANEOUS at 09:54

## 2019-04-13 RX ADMIN — SODIUM CHLORIDE: 9 INJECTION, SOLUTION INTRAVENOUS at 01:00

## 2019-04-13 RX ADMIN — ALBUTEROL SULFATE 2.5 MG: 2.5 SOLUTION RESPIRATORY (INHALATION) at 21:48

## 2019-04-13 RX ADMIN — HYDROCORTISONE: 25 CREAM TOPICAL at 22:08

## 2019-04-13 RX ADMIN — OXYBUTYNIN CHLORIDE 5 MG: 5 TABLET ORAL at 12:15

## 2019-04-13 RX ADMIN — LEVOTHYROXINE SODIUM 88 MCG: 88 TABLET ORAL at 05:18

## 2019-04-13 RX ADMIN — POTASSIUM CHLORIDE AND SODIUM CHLORIDE: 450; 150 INJECTION, SOLUTION INTRAVENOUS at 20:53

## 2019-04-13 RX ADMIN — PANTOPRAZOLE SODIUM 40 MG: 40 INJECTION, POWDER, FOR SOLUTION INTRAVENOUS at 09:54

## 2019-04-13 RX ADMIN — PAROXETINE HYDROCHLORIDE 10 MG: 10 TABLET, FILM COATED ORAL at 09:52

## 2019-04-13 RX ADMIN — BACITRACIN ZINC AND POLYMYXIN B SULFATE: at 22:08

## 2019-04-13 RX ADMIN — ONDANSETRON 4 MG: 2 INJECTION INTRAMUSCULAR; INTRAVENOUS at 14:08

## 2019-04-13 RX ADMIN — OXYBUTYNIN CHLORIDE 5 MG: 5 TABLET ORAL at 22:05

## 2019-04-13 RX ADMIN — METOPROLOL SUCCINATE 50 MG: 25 TABLET, EXTENDED RELEASE ORAL at 09:52

## 2019-04-13 RX ADMIN — BACITRACIN ZINC AND POLYMYXIN B SULFATE: at 10:07

## 2019-04-13 RX ADMIN — CLOPIDOGREL BISULFATE 75 MG: 75 TABLET ORAL at 09:52

## 2019-04-13 RX ADMIN — POTASSIUM CHLORIDE AND SODIUM CHLORIDE: 450; 150 INJECTION, SOLUTION INTRAVENOUS at 10:07

## 2019-04-13 RX ADMIN — Medication 10 ML: at 09:55

## 2019-04-13 RX ADMIN — AMLODIPINE BESYLATE 10 MG: 5 TABLET ORAL at 09:52

## 2019-04-13 RX ADMIN — HYDROCORTISONE: 25 CREAM TOPICAL at 12:15

## 2019-04-13 ASSESSMENT — PAIN SCALES - GENERAL
PAINLEVEL_OUTOF10: 0

## 2019-04-13 NOTE — PROGRESS NOTES
Nutrition Assessment    Type and Reason for Visit: Initial, Positive Nutrition Screen    Nutrition Recommendations: Recommend and start Ensure high Protein supplement TID to help meet increased nutritional needs. Nutrition Assessment: Family at bedside ; Pt states poor po intake x 3 months d/t N/V ; Pt meets critieria for moderate malnutrition AEB poor po intake x 3 months, weight loss, and muscle/fat wasting ; will start nutritional supplementation     Malnutrition Assessment:  · Malnutrition Status: Meets the criteria for moderate malnutrition  · Context: Chronic illness  · Findings of the 6 clinical characteristics of malnutrition (Minimum of 2 out of 6 clinical characteristics is required to make the diagnosis of moderate or severe Protein Calorie Malnutrition based on AND/ASPEN Guidelines):  1. Energy Intake-Less than or equal to 75% of estimated energy requirement, Greater than or equal to 3 months    2. Weight Loss-5% loss or greater, in 1 month  3. Fat Loss-Mild subcutaneous fat loss, Orbital  4. Muscle Loss-Mild muscle mass loss, Temples (temporalis muscle)  5. Fluid Accumulation-No significant fluid accumulation,    6.  Strength-Not measured    Nutrition Risk Level: Moderate    Nutrient Needs:  · Estimated Daily Total Kcal: 6432-7866 (REE 2033 x 1.2 SF)  · Estimated Daily Protein (g): 110-135 (2.0-2.5g/kg IBW)  · Estimated Daily Total Fluid (ml/day): 2803-7625    Nutrition Diagnosis:   · Problem: Moderate malnutrition, In context of chronic illness  · Etiology: related to Insufficient energy/nutrient consumption     Signs and symptoms:  as evidenced by Patient report of, Diet history of poor intake, Mild muscle loss, Mild loss of subcutaneous fat, Weight loss, BMI    Objective Information:  · Nutrition-Focused Physical Findings: I&Os WNL ; 1+ edema ;  Active BS ; rotund abd ; N/V ; loose stools per pt ; U/L dentures ; dry/flaky skin ; pale ; obesity ; mild muscle and fat wasting ; poor appetite x 3 months ; redness/exocoriation to jaswinder area/abd     · Wound Type: Open Wounds(wound noted to R leg)     · Current Nutrition Therapies:  · Oral Diet Orders: General   · Oral Diet intake: 1-25%(per pt ; poor appetite x 3 months per pt)  · Oral Nutrition Supplement (ONS) Orders: None     · Anthropometric Measures:  · Ht: 5' 4\" (162.6 cm)   · Current Body Wt: 345 lb (156.5 kg)(4/12/19, bedscale ; bedscale reading error on 4/13/19)  · Admission Body Wt: 365 lb (165.6 kg)(4/12/19, bedscale ; possible wt discrepancy d/t compression machines on bed)  · Usual Body Wt: 400 lb (181.4 kg)(per pt)  ·   EMR shows weight loss of 24# in the past 1 month (369# actual on 3/15/19 to 345# bedscale on 4/12/19) (7.0%) ; H&P states 50# weight loss ;  Pt states UBW of 400#  · Ideal Body Wt: 120 lb (54.4 kg), % Ideal Body 288%  · BMI Classification: BMI > or equal to 40.0 Obese Class III    Nutrition Interventions:   Continue current diet, Start ONS  Continued Inpatient Monitoring, Coordination of Care    Nutrition Evaluation:   · Evaluation: Goals set   · Goals: Patient will consume 50-75% of most meals and supplements served     · Monitoring: Meal Intake, Supplement Intake, Diet Tolerance, Skin Integrity, Wound Healing, I&O, Monitor Bowel Function, Weight, Pertinent Labs, Chewing/Swallowing, Monitor Hemodynamic Status, Nausea or Vomiting      Electronically signed by Momo Sharma RD, LD on 4/13/19 at 10:42 AM    Contact Number: [phone]

## 2019-04-13 NOTE — PLAN OF CARE
Nutrition Problem:  Moderate malnutrition, In context of chronic illness  Intervention: Food and/or Nutrient Delivery: Continue current diet, Start ONS  Nutritional Goals: Patient will consume 50-75% of most meals and supplements served

## 2019-04-13 NOTE — CONSULTS
Castillo Pate  4/13/2019      Surgical Consult    CHIEF COMPLAINT:  Nausea    HISTORY OF PRESENT ILLNESS:  Castillo Pate is a morbidly obese 66 y.o.  female with several months of nausea, says she has lost 50 pounds over the past few months not eating as much as normal. [place] last month when she slipped in a parking lot and cut her leg when has not healed, no fractures. No abdominal pain. No constipation or vomiting, does have diarrhea at times. Weight: (!) 365 lb (165.6 kg). Past Medical History: She has a past medical history of Cataract, Cholecystitis with cholelithiasis, Diverticulitis, Diverticulosis, DJD (degenerative joint disease), DVT (deep venous thrombosis) ([facility] [address] Fibroids, Glaucoma, HTN (hypertension), Hyperlipidemia, Morbid obesity ([address] GLENNA on CPAP, Osteoarthritis, PE (pulmonary embolism), and Renal cyst.     Past Surgical History: She has a past surgical history that includes Cataract removal with implant (12/2011); linda and bso (cervix removed) (1990); Cholecystectomy, laparoscopic (1995); Vena Cava Filter Placement (1999); Hysterectomy; Colonoscopy; Upper gastrointestinal endoscopy; Cystocopy (jun 2012); and pr glaucoma surg,trabecu ab externo (Right, 11/15/2018). Allergies: Pcn [penicillins] and Noroxin [norfloxacin]    Home Medicines:   Prior to Visit Medications    Medication Sig Taking?  Authorizing Provider   polyethyl glycol-propyl glycol 0.4-0.3 % (SYSTANE) 0.4-0.3 % ophthalmic solution 1 drop as needed for Dry Eyes Yes Historical Provider, MD   brinzolamide-brimonidine 1-0.2 % SUSP Apply 1 drop to eye 2 times daily Left eye Yes Historical Provider, MD   bimatoprost (LUMIGAN) 0.01 % SOLN ophthalmic drops Place 1 drop into the left eye nightly Yes Historical Provider, MD   Psyllium (METAMUCIL FIBER PO) Take by mouth 2 times daily Yes Historical Provider, MD   aspirin 81 MG EC tablet Take 81 mg by mouth daily Pt instructed to check hold with dr. Gurpreet Patel Historical Provider, MD negative  Musculoskeletal:negative  Behavioral/Psych: negative  All others reviewed, negative    Recent Labs     04/12/19  1620 04/13/19  0750   WBC 10.7 6.9   HGB 14.3 12.3    238   * 145   * 109*   K 4.0 3.1*   BUN 12 10   CREATININE 0.8 0.8   GLUCOSE 121* 97   LABALBU 3.8 3.3*   PROT 6.7 5.9*   CALCIUM 9.8 9.1   MG 1.8  --    BILITOT 1.8* 1.4*   BILIDIR  --  0.4*   ALKPHOS 127* 100   AST 16 12   ALT 6 5       Physical exam:   VITALS:  Blood pressure (!) 115/58, pulse 59, temperature 98 °F (36.7 °C), temperature source Oral, resp. rate 16, height 5' 4\" (1.626 m), weight (!) 345 lb 3.2 oz (156.6 kg), SpO2 97 %. General appearance: alert, appears stated age and cooperative  Head: Normocephalic, without obvious abnormality, atraumatic  Eyes: PERRL, EOMI  Neck: no adenopathy, no carotid bruit, no JVD, supple, symmetrical, trachea midline and thyroid not enlarged, symmetric, no tenderness/mass/nodules  Lungs: clear to auscultation bilaterally  Heart: regular rate and rhythm,   Abdomen: soft, non tender; bowel sounds normal; no hernias palpable  Extremities: extremities normal, atraumatic, no cyanosis or edema    ASSESSMENT: morbid obesity, nausea, weight loss    PLAN: high protein, low calorie bariatric diet to keep the weight going down. Signed: Dr. Jordan Orosco M.D.     Send copy of H&P to PCPKole DO

## 2019-04-13 NOTE — PLAN OF CARE
Problem: Falls - Risk of:  Goal: Will remain free from falls  Description  Will remain free from falls  4/13/2019 1205 by Ayse King RN  Outcome: Met This Shift     Problem: Risk for Impaired Skin Integrity  Goal: Tissue integrity - skin and mucous membranes  Description  Structural intactness and normal physiological function of skin and  mucous membranes.   4/13/2019 1205 by Ayse King RN  Outcome: Met This Shift

## 2019-04-13 NOTE — PROGRESS NOTES
Progress Note    History:  Ate a little breakfast, but quickly nauseated/queasy and loss of appetite when tasted food, no emesis, no sense reflux, denies any abdominal pain or cramps, had bowel movement last night which she perceived to be soft without actual diarrhea. Feels that she was able to void urine satisfactorily at that time. No shortness of breath or cough, no chest pain. Mouth continues to feel dry. Denies distal lower extremity symptoms from motor or sensory review, admits to discomfort posterior right calf and possibly near or in the knee also. Patient's medical and surgical history, medications and allergies were reviewed. Interval notes of consultants, nurses, therapists, and ancillary services were also reviewed, as well as the medical administration record. Physical Exam:  Vitals:    04/12/19 2021 04/12/19 2137 04/13/19 0045 04/13/19 0820   BP: (!) 147/72 (!) 126/58  (!) 115/58   Pulse: 60 53  59   Resp: 22 18  16   Temp: 98.2 °F (36.8 °C) 98.1 °F (36.7 °C)  98 °F (36.7 °C)   TempSrc: Oral Oral  Oral   SpO2: 96% 95% 98% 97%   Weight:  (!) 345 lb 3.2 oz (156.6 kg)     Height:  5' 4\" (1.626 m)         Color satisfactory, no icterus or cyanosis. Sensorium is clear. Oropharynx mucosa and tongue remained dry. Lung auscultation reveals decreased basilar aeration bilaterally, scattered adventitious noise left fields, no overt wheezes or rales, respirations nonlabored. Heart rhythm is regular, no S3 or S4 is appreciated, tones are fair, heart rate 60-64/m. Abdomen is soft, obese, normoactive bowel sounds all quadrants, tenderness with palpation is denied, no rebound or rigidity.  Right foot color is normal, 5 over 5 strength in dorsi and plantar flexion and extensor hallucis function, posterior calf remains firm and discolored, right upper posterior lateral calf wound is unchanged with small amount of serosanguineous drainage, the range of motion is slightly difficult with nonreproducible clunk on flexion, strength is 3-4/5. Assessment/Plan:   1. Dehydration, resolving viral pneumonitis with pulmonary atelectasis  2. Contusion right leg, open wound right calf, mobility impairment  3. Swallowing dysfunction, weight loss, anorexia and nausea, gastroesophageal reflux disease, history of diverticulosis  4. Essential hypertension, PACs  5. Supermorbid obesity  6. Hypothyroidism on supplementation  7. Hypercholesterolemia on statin  8.  Hypokalemia  Discussed with the patient and her , maintain  hold on loop diuresis as blood pressures are adequately controlled and clinically she is still dry, supplement potassium intravenous fashion, maintain empiric PPI and antibiotic, cultures awaited, follow for therapy assessments as to mobility, potential rehab setting need post discharge if mobility unable to be effected, general surgical consultation regarding abdominal symptoms, continue respiratory aerosol/bronchodilator  Electronically by Caryle Beams, DO  on 4/13/19 at 9:47 AM

## 2019-04-13 NOTE — CARE COORDINATION
SS NOTE: SW met with pt and her  Madai Lai. They reside together in a 1 floor plan home with ramped entry. Pt relates that Madai Lai supervised and assisted her with ADLS, IADLS and driving. Pt relates that she was mobile at home with a ww. Pt has dme at home: ww, wch and a shower seat. Pt relates that she has had [facility] in the past but cannot recall the name. Pt has never has a SNF or rehab stay. Pt's PCP is Dr Sergei Valderrama and she only uses mail order for rxes. SW advised pt and her  of Dr Rakel Marques request to assist with a SNF/rehab stay. SW shared the SNF provider list and medicare coverage for it. SW also suggested [facility]. Pt not willing to make a decision yet, however  related that they reside in [place], thus [employer] may be very convenient. For a SNF stay pt will need to be determined an inpt here and be an inpt for 3 day qualifying stay here. Pt will need a signed DELIA, current PT/OT notes and SW will need to provide the HENs. Guillermina Villanueva. 4/13/2019.10:48 AM.

## 2019-04-14 LAB
ANION GAP SERPL CALCULATED.3IONS-SCNC: 11 MMOL/L (ref 7–16)
BUN BLDV-MCNC: 9 MG/DL (ref 8–23)
CALCIUM SERPL-MCNC: 9.4 MG/DL (ref 8.6–10.2)
CHLORIDE BLD-SCNC: 111 MMOL/L (ref 98–107)
CO2: 24 MMOL/L (ref 22–29)
CREAT SERPL-MCNC: 0.8 MG/DL (ref 0.5–1)
GFR AFRICAN AMERICAN: >60
GFR NON-AFRICAN AMERICAN: >60 ML/MIN/1.73
GLUCOSE BLD-MCNC: 90 MG/DL (ref 74–99)
POTASSIUM SERPL-SCNC: 3.5 MMOL/L (ref 3.5–5)
SODIUM BLD-SCNC: 146 MMOL/L (ref 132–146)

## 2019-04-14 PROCEDURE — 2580000003 HC RX 258: Performed by: FAMILY MEDICINE

## 2019-04-14 PROCEDURE — 6370000000 HC RX 637 (ALT 250 FOR IP): Performed by: FAMILY MEDICINE

## 2019-04-14 PROCEDURE — 97167 OT EVAL HIGH COMPLEX 60 MIN: CPT

## 2019-04-14 PROCEDURE — 6360000002 HC RX W HCPCS: Performed by: FAMILY MEDICINE

## 2019-04-14 PROCEDURE — 97530 THERAPEUTIC ACTIVITIES: CPT

## 2019-04-14 PROCEDURE — 36415 COLL VENOUS BLD VENIPUNCTURE: CPT

## 2019-04-14 PROCEDURE — 97110 THERAPEUTIC EXERCISES: CPT

## 2019-04-14 PROCEDURE — 94640 AIRWAY INHALATION TREATMENT: CPT

## 2019-04-14 PROCEDURE — 97535 SELF CARE MNGMENT TRAINING: CPT

## 2019-04-14 PROCEDURE — 99232 SBSQ HOSP IP/OBS MODERATE 35: CPT | Performed by: SURGERY

## 2019-04-14 PROCEDURE — 80048 BASIC METABOLIC PNL TOTAL CA: CPT

## 2019-04-14 PROCEDURE — C9113 INJ PANTOPRAZOLE SODIUM, VIA: HCPCS | Performed by: FAMILY MEDICINE

## 2019-04-14 PROCEDURE — 1200000000 HC SEMI PRIVATE

## 2019-04-14 RX ORDER — CLONIDINE 0.3 MG/24H
1 PATCH, EXTENDED RELEASE TRANSDERMAL WEEKLY
Status: DISCONTINUED | OUTPATIENT
Start: 2019-04-14 | End: 2019-04-23 | Stop reason: HOSPADM

## 2019-04-14 RX ORDER — SIMETHICONE 80 MG
80 TABLET,CHEWABLE ORAL
Status: DISCONTINUED | OUTPATIENT
Start: 2019-04-14 | End: 2019-04-23 | Stop reason: HOSPADM

## 2019-04-14 RX ADMIN — PANTOPRAZOLE SODIUM 40 MG: 40 INJECTION, POWDER, FOR SOLUTION INTRAVENOUS at 08:07

## 2019-04-14 RX ADMIN — BACITRACIN ZINC AND POLYMYXIN B SULFATE: at 22:10

## 2019-04-14 RX ADMIN — METOPROLOL SUCCINATE 50 MG: 25 TABLET, EXTENDED RELEASE ORAL at 09:48

## 2019-04-14 RX ADMIN — SIMETHICONE CHEW TAB 80 MG 80 MG: 80 TABLET ORAL at 12:37

## 2019-04-14 RX ADMIN — SIMETHICONE CHEW TAB 80 MG 80 MG: 80 TABLET ORAL at 18:02

## 2019-04-14 RX ADMIN — CLOPIDOGREL BISULFATE 75 MG: 75 TABLET ORAL at 09:48

## 2019-04-14 RX ADMIN — LEVOTHYROXINE SODIUM 88 MCG: 88 TABLET ORAL at 06:25

## 2019-04-14 RX ADMIN — POTASSIUM CHLORIDE AND SODIUM CHLORIDE: 450; 150 INJECTION, SOLUTION INTRAVENOUS at 10:42

## 2019-04-14 RX ADMIN — ATORVASTATIN CALCIUM 10 MG: 10 TABLET, FILM COATED ORAL at 22:07

## 2019-04-14 RX ADMIN — HYDROCORTISONE: 25 CREAM TOPICAL at 12:55

## 2019-04-14 RX ADMIN — Medication 10 ML: at 08:07

## 2019-04-14 RX ADMIN — ALBUTEROL SULFATE 2.5 MG: 2.5 SOLUTION RESPIRATORY (INHALATION) at 06:33

## 2019-04-14 RX ADMIN — OXYBUTYNIN CHLORIDE 5 MG: 5 TABLET ORAL at 22:07

## 2019-04-14 RX ADMIN — HYDROCORTISONE: 25 CREAM TOPICAL at 22:10

## 2019-04-14 RX ADMIN — ALBUTEROL SULFATE 2.5 MG: 2.5 SOLUTION RESPIRATORY (INHALATION) at 17:11

## 2019-04-14 RX ADMIN — OXYBUTYNIN CHLORIDE 5 MG: 5 TABLET ORAL at 10:57

## 2019-04-14 RX ADMIN — PAROXETINE HYDROCHLORIDE 10 MG: 10 TABLET, FILM COATED ORAL at 09:48

## 2019-04-14 RX ADMIN — ALBUTEROL SULFATE 2.5 MG: 2.5 SOLUTION RESPIRATORY (INHALATION) at 09:25

## 2019-04-14 RX ADMIN — BACITRACIN ZINC AND POLYMYXIN B SULFATE: at 12:54

## 2019-04-14 RX ADMIN — AMLODIPINE BESYLATE 10 MG: 5 TABLET ORAL at 09:48

## 2019-04-14 RX ADMIN — WATER 1 G: 1 INJECTION INTRAMUSCULAR; INTRAVENOUS; SUBCUTANEOUS at 08:07

## 2019-04-14 RX ADMIN — SIMETHICONE CHEW TAB 80 MG 80 MG: 80 TABLET ORAL at 22:07

## 2019-04-14 ASSESSMENT — PAIN SCALES - GENERAL
PAINLEVEL_OUTOF10: 0

## 2019-04-14 NOTE — PROGRESS NOTES
Occupational Therapy  Occupational Therapy Initial Assessment    Date:2019  Patient Name: Eliud Wilkerson  MRN: [id number]  : 1940  ROOM #: [id number]    Placement Recommendation: Subacute    Equipment Prescriptions Needed: TBD at rehab    [facility]   AM-PAC Daily Activity Inpatient   How much help for putting on and taking off regular lower body clothing?: Total  How much help for Bathing?: A Lot  How much help for Toileting?: Total  How much help for putting on and taking off regular upper body clothing?: A Lot  How much help for taking care of personal grooming?: A Lot  How much help for eating meals?: A Little  AM-PAC Inpatient Daily Activity Raw Score: 11  AM-PAC Inpatient ADL T-Scale Score : 29.04  ADL Inpatient CMS 0-100% Score: 70.42  ADL Inpatient CMS G-Code Modifier : CL    Diagnosis:   1. Pneumonia due to organism    2. Generalized weakness    3. Dehydration      Past Medical History:   Past Medical History:   Diagnosis Date    Cataract     right eye    Cholecystitis with cholelithiasis     Diverticulitis     Diverticulosis     DJD (degenerative joint disease)     DVT (deep venous thrombosis) (HCC)     RLE    Fibroids     Glaucoma     HTN (hypertension)     Hyperlipidemia     states on med for precaution    Morbid obesity ([address]     GLENNA on CPAP     Osteoarthritis     PE (pulmonary embolism)     Renal cyst     right         The admitting diagnosis and active problem list as listed above have been reviewed prior to the initiation of this evaluation. Nursing cleared the patient for evaluation. Patient is agreeable to evaluation. Precautions: falls, low air loss bed  Pain Scale: Numeric Rate: 4/10 R knee pain; Nursing notified. Social history:     Home architecture:  Mobile home, ramp to enter  PLOF: Needs assistance with BADL and IADL, its been over 2 weeks since pt last ambulated, in the past two weeks the pt as only transferred out of bed twice    Equipment owned: wheeled walker, wheelchair, bedside commode, shower chair  Cognition: oriented x 4; follows 3 step directions. good  Problem solving skills   good  Memory    good  Sequencing  Communication: intact   Visual perceptual skills: intact     Glasses: no   Edema: yes, B LE's     Sensation: intact   Hand Dominance:  Left     X  Right     Left Right Comment   Passive range of motion [facility]     Active range of motion [facility]  WFL with exception of limited shoulder flexion     Muscle Grade 4/5 2/5 proximally  3/5 distally     /pinch Strength Intact  Intact     [] Malnutrition indicators have been identified and nursing has been notified to ensure a dietitian consult is ordered. Function Assessment    Status  Goal Comment   Feeding:  Supervision   Independent      Grooming: Moderate Assist  Independent   To brush at at edge of bed and don headban   UE dressing:  Maximal Assist  Supervision     LE dressing:  Dependent  Modified [place]     Bathing:  Maximal Assist  Modified [place]     Bed Mobility:     Moderate Assist x 2  for supine to sit,   Moderate Assist x 2 for scooting,  Maximal Assist x 2 for sit to supine  Minimal Assist     Functional Transfers:    Maximal Assist x 2 for sit to stand transfer from EOB to wheeled walker with use of chux as sling Minimal Assist  Safety: good    Functional Mobility: Pt was unable to advance B LE's at this time Modified [place]       Pt/family participated in establishment of goals.      Balance:   Sitting: good   Standing: poor with wheeled walker    Endurance: fair minus        Assessment of Current Deficits:   [x]Functional mobility  [x]ROM  [x]Strength   []Cognition   []Safety Awareness    [x]ADLs [x]IADLs   [x]Endurance  [x]Balance    []Vision  []Sensation     [x]Gross Motor Coordination       [x]Fine Motor Coordination     · High Complexity   · History: Extensive review of medical records and additional review of physical, cognitive, or psychosocial history related

## 2019-04-14 NOTE — H&P
[address]                              HISTORY AND PHYSICAL    PATIENT NAME: Filipe Strauss                  :        1940  MED REC NO:   [id number]                            ROOM:       [id number]  ACCOUNT NO:   [de-identified]                           ADMIT DATE: 2019  PROVIDER:     Cathryn Wilkerson    HISTORY OF PRESENT ILLNESS:  This 70-year-old female was admitted  through emergency room with dehydration and mobility impairment. The  patient relates that through the winter months, she has experienced a  fair degree of appetite loss and states that she has actually lost  approximately 50 pounds in this time period without conscious effort,  associated with intermittent nausea and occasional difficulty with  swallowing certain foods with sensation that they are stuck. Bowel  movements vary from constipation to yellow and loose, but generally not  more than one stool per day, no hematochezia or melena. She denies  abdominal pain or cramps, no substantial distention. Approximately one  month prior to admission, she sustained a fall in a parking lot at a  [employer], landing on her right leg with subsequent inability to  achieve her prior level of functional mobility. The leg became very  swollen, black and blue with a small wound on the posterior aspect. Since that time, she is unable to bear satisfactory weight on the leg  with a sensation of buckling which she delineates between her ankle and  knee rather than the knee joint itself. There are no foot symptoms. The day of admission, she was unable to get out of her car despite  maximum assistance and was subsequently transported to emergency room  for evaluation, and subsequent admission was carried out.   Two to three  weeks prior to admission, she was exposed to a neighbor who had  sinusitis, the patient states that she did develop headache and than that related to her recent  injury. GASTROINTESTINAL:  Anorexia, weight loss, nausea as in chief  complaint, potential swallowing dysfunction as above. Denies  hematemesis, hematochezia, or melena. Loose yellow stool alternating  with constipation, prior history of diverticular disease. GYNECOLOGICAL:   0, para 0. She denies any vaginal  symptomatology since the time of her hysterectomy performed for uterine  fibroids. Breast review of systems is denied or negative. GENITOURINARY:  Prior history of renal and ureteral calculi with stent  insertion and lithotripsy. Presently denies dysuria or hematuria, no  actual incontinence, prior history of overactive bladder. NEUROMUSCULAR:  Headache associated with her recent acute illness,  presently resolved, no visual or auditory disturbance, positive history  of glaucoma, no other changes in visual or auditory apparatus, right leg  symptoms as above. PHYSICAL EXAMINATION:  GENERAL APPEARANCE:  A 19-year-old female. She is alert and  cooperative, well-oriented x3, no abnormal ideation is present,  sensorium is clear. HEENT:  Head is normocephalic. Scalp is nontender. The hair has  satisfactory distribution and texture. Bilateral periorbital erythema  is present with mild discharge, left conjunctive and slight conjunctival  injection on the left. Pupils bilaterally nonreactive at 3 mm. Extraocular muscles intact without nystagmus. Nasal passages are clear. External auditory canals were clear with intact tympanic membranes. Oropharynx reveals dry mucosa with secretions over the posterior palate. Furrowed and dry tongue with normal tongue and uvula motion. Minimal  mucoid exudate posterior pharynx. NECK:  Supple without nuchal rigidity. Thyroid examination is negative. Carotids are 1+ without bruits.   LUNGS:  Revealed mildly decreased breath sounds generally with  suboptimal excursion, bibasilar decreased breath sounds, no overt rales  or wheezes appreciated, although adventitious noises were present  throughout the anterior and posterior lung fields bilaterally. HEART:  Rhythm is with occasional early beat. No S3, S4 is heard. Heart rate is 56-60 per minute. Tones are fair. ABDOMEN:  Soft and obese. Bowel sounds are active in all quadrants. Tenderness is denied with palpation. No rebound or rigidity is present. NEUROLOGIC:  Gait and ranges of motion are not tested secondary to her  mobility lack, dorsi and plantar flexion and extensor hallucis longus on  the right are 5/5 and symmetric to the left. Right knee motion is 3-4/5  strength and some limitation,crescent arrow shaped wound is present to the upper  posterolateral calf with serosanguineous drainage, firm ecchymotic  changes present to the upper and middle posterior calf, large ecchymoses  are present over the hands bilaterally, dorsal surface, collar like  incomplete circumferential maculopapular eruption is present to the  upper chest bilaterally. TENTATIVE DIAGNOSES:  1. Acute dehydration, potential viral pneumonitis or bacterial  pneumonia, rule out urinary tract infection. 2.  Swallowing dysfunction with anorexia and nausea, weight loss,  gastroesophageal reflux disease, history of diverticular disease. 3.  Essential hypertension, suspect PACs. 4.  Contusion of the right leg with open wound, mobility impairment, cellulitis  right leg.         Qamar Krishnamurthy    D: 04/13/2019 10:18:46       T: 04/13/2019 10:22:26     [email or web address]  Job#: [id number]     Doc#: [id number]    CC:

## 2019-04-14 NOTE — PROGRESS NOTES
PHYSICAL THERAPY TREATMENT NOTE    4/14/2019  [id number]                      Marguerite De La Cruz   [id number]                              1940    Patient Active Problem List   Diagnosis    [facility] ([facility]F) ([facility]    Pyelonephritis    Obstructive uropathy    HTN (hypertension)    Hyperlipidemia    Obstructive sleep apnea    Glaucoma    DJD (degenerative joint disease)    Morbid obesity ([facility]    Kidney stone    Pneumonia    Moderate protein-calorie malnutrition ([facility]       Recommendation for discharge: Subacute  Equipment prescriptions needed: none       AM-[facility] Mobility Inpatient   How much difficulty turning over in bed?: A Lot  How much difficulty sitting down on / standing up from a chair with arms?: A Lot  How much difficulty moving from lying on back to sitting on side of bed?: A Lot  How much help from another person moving to and from a bed to a chair?: A Lot  How much help from another person needed to walk in hospital room?: Total  How much help from another person for climbing 3-5 steps with a railing?: Total  AM-PAC Inpatient Mobility Raw Score : 10  AM-PAC Inpatient T-Scale Score : 32.29  Mobility Inpatient CMS 0-100% Score: 76.75  Mobility Inpatient CMS G-Code Modifier : CL      Precautions: falls, alarm,     S: Patient cleared by nursing for treatment. Patient is agreeable to treatment. Family was present and supportive. Pain status: (measured on a visual analog scale with 0=no pain and 10=excruciating pain) 0/10. O: Pt was instructed in and performed the following:   Bed Mobility- Supine to sit- Moderate assistance of 2    Scooting- Moderate assistance of 2     Sit to supine- Maximal assistance of 2     Transfers-sit to stand- Moderate/Maximal assistance of 2     Gait: NA   Steps: NA  Treatment: Pt was educated and facilitated on techniques to increase safety and independence with bed mobility, balance, functional transfers, and functional mobility.  Pt rolled x 2 reps for hygiene and line change. Pt required moderate assist for rolling. Pt transferred to side of bed and sat up x 10 minutes to increase dynamic sitting balance and activity tolerance. Pt stood x 1 rep and scooted towards HOB x 1 rep. Pt RTB and rolled x 2 reps for linen change. Pt performed the following supine exercises with AROM: ankle pumps, gluteal sets, quad sets, heel slides, and hip abduction x 10 reps. V/C were needed for technique. Comment: Call light left by patient. Pt was returned to bed at end of treatment, alarm was activated. A: Pt had good tolerance to above treatment but unable to progress d/t BLE weakness. Pt fatigued quickly and was unable to stand for 1 minute plus. Pt is at risk of falls with decreased strength and endurance limiting function. P: Continue with physical therapy       Antonella Hernandez, PTA    Goals to be met in 3 days. Bed mobility- Moderate assistance   Transfers- Moderate assistance   Ambulation- Maximal assistance +2 for 5 feet using wheeled walker     Increase strength in affected muscle groups by 1/3 grade  Increase balance to allow for improvement towards functional goals. Increase endurance to allow for improvement towards functional goals.

## 2019-04-14 NOTE — PROGRESS NOTES
Progress Note    History:  Did not eat lunch or supper yesterday, minimal fluid intake which she relates as necessary for medications only, has not eaten breakfast tray this morning although she has a little appetite and no nausea today. States that she just can't tolerate present diet, and the chocolate products cause indigestion. Denies reflux or heartburn, no emesis. No abdominal pain or cramps. Sat up at the side of the bed yesterday with assistance for therapy, did not stand, exercising her foot and knee while in bed, posterior leg is less painful. Perceives that her right knee cracks with range of motion use. Patient's medical and surgical history, medications and allergies were reviewed. Interval notes of consultants, nurses, therapists, and ancillary services were also reviewed, as well as the medical administration record. Physical Exam:  Vitals:    04/13/19 1410 04/13/19 2035 04/13/19 2356 04/14/19 0753   BP:  130/62  137/70   Pulse:  59 58 72   Resp:  18  16   Temp:  98.6 °F (37 °C)  98 °F (36.7 °C)   TempSrc:  Oral  Oral   SpO2: 94%  94%    Weight:       Height:           Are satisfactory, with no cyanosis, no icterus. Sensorium is clear. Oropharynx less dry mucosa, improved tongue appearance. Color distribution-like rash upper chest slightly less angry-appearing, attributed to skin fold approximation. Lungs nonlabored respirations, improved air movement overall, slightly rough for adventitious noise left anterior fields. Heart tones same, no S3 or S4 is heard, rhythm is presently regular, heart rate 60/m. Abdomen soft and obese, active bowel sounds. Right posterior calf is less sore and tissue was softer overall, improved appearance of wound area with induration decreasing to 3 cm. Foot function remains normal. Voluntary need movement is improved. Assessment/Plan:   1. Dehydration, resolving viral pneumonitis with pulmonary atelectasis  2.  Contusion right leg, open wound right calf with cellulitis, mobility impairment  3. Swallowing dysfunction, weight loss, anorexia and nausea, GERD, history of diverticulosis, super morbid obesity  4. Essential hypertension, PACs  5.  Hypothyroidism, hypercholesterolemia, resolving hypokalemia  Discussed with the patient, resume general diet with recommended decreased portion intake, decrease intravenous fluid rate with continued potassium supplementation, maintain respiratory bronchodilator, cultures to date negative as his respiratory panel rate, if continued improvement plan oral antibiosis for right leg tomorrow, recommend proved oral intake of fluids, follow for activity tolerance  Electronically by Shruthi Hartley DO  on 4/14/19 at 9:12 AM

## 2019-04-14 NOTE — PROGRESS NOTES
[id number]    PHYSICAL THERAPY INITIAL EVALUATION  Tentative placement recommendation: SNF  Equipment prescriptions needed:  none  Plan of care: Patient will be seen  daily for therapeutic exercise, functional retraining, endurance activities, balance exercises, family and patient education. AM-PAC Basic Mobility       AM-[facility] Mobility Inpatient   How much difficulty turning over in bed?: A Lot  How much difficulty sitting down on / standing up from a chair with arms?: Unable  How much difficulty moving from lying on back to sitting on side of bed?: Unable  How much help from another person moving to and from a bed to a chair?: Total  How much help from another person needed to walk in hospital room?: Total  How much help from another person for climbing 3-5 steps with a railing?: Total  AM-PAC Inpatient Mobility Raw Score : 7  AM-PAC Inpatient T-Scale Score : 26.42  Mobility Inpatient CMS 0-100% Score: 92.36  Mobility Inpatient CMS G-Code Modifier : CM    Order: evaluate and treat  Diagnosis:    1. Pneumonia due to organism    2. Generalized weakness    3.  Dehydration      Past medical history:       Diagnosis Date    Cataract     right eye    Cholecystitis with cholelithiasis     Diverticulitis     Diverticulosis     DJD (degenerative joint disease)     DVT (deep venous thrombosis) (HCC)     RLE    Fibroids     Glaucoma     HTN (hypertension)     Hyperlipidemia     states on med for precaution    Morbid [facility] ([facility]     GLENNA on CPAP     Osteoarthritis     PE (pulmonary embolism)     Renal cyst     right   ;      Procedure Laterality Date    CATARACT REMOVAL WITH IMPLANT  12/2011    left eye    CHOLECYSTECTOMY, LAPAROSCOPIC  1995    COLONOSCOPY      CYSTOSCOPY  jun 2012    cystoscopy, right ureteroscopy with retrograde pyelograms, stone manipulation, stent insertion, laser lithotripsy with holmium laser    HYSTERECTOMY      UT GLAUCOMA SURG,TRABECU AB EXTERNO Right 11/15/2018    RIGHT EYE REVISION OF XEN EYE STENT, TRABECULECTOMY performed by Elin Farfan [facility] at [facility]    for RLE DVT       The admitting diagnosis and active problem list as listed above have been reviewed prior to the initiation of this evaluation. Nursing cleared the patient for evaluation. Patient is agreeable to evaluation. Precautions: falls   Social history: Patient lives with family   in a single family home with No steps to enter        Equipment owned: bedside commode and wheelchair,  wheeled walker  Mentation: alert, cooperative, oriented x 3 and follows directions,   Prior level of function: unable to walk past 2 weeks, transferred twice in past 2 weeks only  ROM: within functional limits   STRENGTH: 4-/5, R shld RTC tear unable to elevate  PAIN: (measured on a visual analog scale with 0=no pain and 10=excruciating pain) 0/10. FUNCTIONAL ASSESSMENT   Bed Mobility- Supine to sit-Maximal assistance      Scooting- Maximal assistance      Sit to supine- Maximal assistance    Patient able to dangle on edge of bed for 5 minutes with moderate assist, blocked knee to keep from sliding off bed   Transfers-sit to stand- unable   Gait:  unable    Balance: sit-fair       stand NA    Edema: yes - LEs  Endurance: poor     Treatment:evaluation;    Patient/family education:effects of immobilty     Rehab Potential: fair  for baseline  Patients Goal: get stronger and walk  ASSESSMENT  Patient exhibits decreased   strength, endurance,  balance, coordination impairing functional mobility. Goals to be met in 3 days. Bed mobility-Moderate assistance   Transfers-Moderate assistance   Ambulation-Maximal assistance +2 for 5 feet using  wheeled walker       Increase strength in affected muscle groups by 1/3 grade  Increase balance to allow for improvement towards functional goals.   Increase endurance to allow for improvement towards functional goals.

## 2019-04-14 NOTE — PROGRESS NOTES
Ben Conteh  4/14/2019      Progress Note    Ben Conteh is a morbidly obese 66 y.o.  female with nausea, no emesis over night but not hungry. Right leg wound examined and seems to be slowly healing. Weight: (!) 365 lb (165.6 kg). Past Medical History: She has a past medical history of Cataract, Cholecystitis with cholelithiasis, Diverticulitis, Diverticulosis, DJD (degenerative joint disease), DVT (deep venous thrombosis) ([facility] [place] [address] Fibroids, Glaucoma, HTN (hypertension), Hyperlipidemia, Morbid obesity ([facility] [address] GLENNA on CPAP, Osteoarthritis, PE (pulmonary embolism), and Renal cyst.     Past Surgical History: She has a past surgical history that includes Cataract removal with implant (12/2011); linda and bso (cervix removed) (1990); Cholecystectomy, laparoscopic (1995); Vena Cava Filter Placement (1999); Hysterectomy; Colonoscopy; Upper gastrointestinal endoscopy; Cystocopy (jun 2012); and pr glaucoma surg,trabecu ab externo (Right, 11/15/2018). Allergies: Pcn [penicillins] and Noroxin [norfloxacin]    Home Medicines:   Prior to Visit Medications    Medication Sig Taking? Authorizing Provider   polyethyl glycol-propyl glycol 0.4-0.3 % (SYSTANE) 0.4-0.3 % ophthalmic solution 1 drop as needed for Dry Eyes Yes Historical Provider, MD   brinzolamide-brimonidine 1-0.2 % SUSP Apply 1 drop to eye 2 times daily Left eye Yes Historical Provider, MD   bimatoprost (LUMIGAN) 0.01 % SOLN ophthalmic drops Place 1 drop into the left eye nightly Yes Historical Provider, MD   Psyllium (METAMUCIL FIBER PO) Take by mouth 2 times daily Yes Historical Provider, MD   aspirin 81 MG EC tablet Take 81 mg by mouth daily Pt instructed to check hold with dr. Teddi Councilman Historical Provider, MD   [facility] OIL Take 1,000 mg by mouth daily Ld 11/9/2018 Yes Historical Provider, MD   Docusate Sodium (DOCULASE PO) Take 1 tablet by mouth as needed.    Yes Historical Provider, MD   amlodipine (NORVASC) 2.5 MG tablet Take 1 tablet by mouth daily. Patient taking differently: Take 5 mg by mouth Daily with lunch  Yes Shruthi Hartley,    metoprolol (TOPROL-XL) 50 MG XL tablet Take 1 tablet by mouth daily. Yes Shruthi Hartley,    nitroGLYCERIN (NITRODUR) 0.4 MG/HR Place 1 patch onto the skin daily. Yes Shruthi Hartley,    clonidine (CATAPRES) 0.3 MG/24HR Place 1 patch onto the skin once a week Indications: saturday  Yes Historical Provider, MD   furosemide (LASIX) 40 MG tablet Take 40 mg by mouth daily. Yes Historical Provider, MD   atorvastatin (LIPITOR) 10 MG tablet Take 10 mg by mouth daily. Yes Historical Provider, MD   clopidogrel (PLAVIX) 75 MG tablet Take 75 mg by mouth daily Pt instructed to check hold with dr. Sai Cobb Historical Provider, MD   paroxetine (PAXIL-CR) 12.5 MG CR tablet Take 12.5 mg by mouth every morning Instructed to take am of procedure Yes Historical Provider, MD   oxybutynin (DITROPAN) 5 MG tablet Take 15 mg by mouth daily. Yes Historical Provider, MD   tamsulosin (FLOMAX) 0.4 MG capsule Take 1 capsule by mouth daily. Dayanna Pastures MD Austyn   nitroGLYCERIN (NITROSTAT) 0.4 MG SL tablet Place 0.4 mg under the tongue every 5 minutes as needed. Historical Provider, MD       Social History:   TOBACCO:   reports that she quit smoking about 27 years ago. She has a 30.00 pack-year smoking history. She has never used smokeless tobacco.  ETOH:    reports that she does not drink alcohol.        Problem Relation Age of Onset    Cancer Brother          - lung cancer    COPD Father     Coronary Art Dis Father     Heart Attack Mother 62        sudden death        REVIEW OF SYSTEMS:  Constitutional: negative  Respiratory: negative  Cardiovascular: negative  Gastrointestinal: negative  Musculoskeletal:negative  Behavioral/Psych: negative  All others reviewed, negative    Recent Labs     19  1620 19  0750 19  0654   WBC 10.7 6.9  --    HGB 14.3 12.3  --     238  --    * 145 146   * 109* 111*   K 4.0 3. 1* 3.5   BUN 12 10 9   CREATININE 0.8 0.8 0.8   GLUCOSE 121* 97 90   LABALBU 3.8 3.3*  --    PROT 6.7 5.9*  --    CALCIUM 9.8 9.1 9.4   MG 1.8  --   --    BILITOT 1.8* 1.4*  --    BILIDIR  --  0.4*  --    ALKPHOS 127* 100  --    AST 16 12  --    ALT 6 5  --        Physical exam:   VITALS:  Blood pressure 137/70, pulse 72, temperature 98 °F (36.7 °C), temperature source Oral, resp. rate 16, height 5' 4\" (1.626 m), weight (!) 345 lb 3.2 oz (156.6 kg), SpO2 94 %. General appearance: alert, appears stated age and cooperative  Head: Normocephalic, without obvious abnormality, atraumatic  Eyes: PERRL, EOMI  Neck: no adenopathy, no carotid bruit, no JVD, supple, symmetrical, trachea midline and thyroid not enlarged, symmetric, no tenderness/mass/nodules  Lungs: clear to auscultation bilaterally  Heart: regular rate and rhythm,   Abdomen: soft, non tender; bowel sounds normal; no hernias palpable  Extremities: 1 cm open wound on right lateral calf with surrounding granulation tissue, no drainage. ASSESSMENT: morbid obesity, nausea, right calf wound    PLAN:   high protein, low calorie bariatric diet to keep the weight going down. Continue dressing changes on the leg wound. Signed: Dr. Ander Molina M.D.     Send copy of H&P to PCP, Jennyfer Morales DO

## 2019-04-15 ENCOUNTER — APPOINTMENT (OUTPATIENT)
Dept: INTERVENTIONAL RADIOLOGY/VASCULAR | Age: 79
DRG: 871 | End: 2019-04-15
Payer: MEDICARE

## 2019-04-15 LAB
ANION GAP SERPL CALCULATED.3IONS-SCNC: 10 MMOL/L (ref 7–16)
BUN BLDV-MCNC: 8 MG/DL (ref 8–23)
CALCIUM SERPL-MCNC: 9.2 MG/DL (ref 8.6–10.2)
CHLORIDE BLD-SCNC: 110 MMOL/L (ref 98–107)
CO2: 22 MMOL/L (ref 22–29)
CREAT SERPL-MCNC: 0.8 MG/DL (ref 0.5–1)
EKG ATRIAL RATE: 61 BPM
EKG P-R INTERVAL: 176 MS
EKG Q-T INTERVAL: 450 MS
EKG QRS DURATION: 94 MS
EKG QTC CALCULATION (BAZETT): 453 MS
EKG R AXIS: 36 DEGREES
EKG T AXIS: 38 DEGREES
EKG VENTRICULAR RATE: 61 BPM
GFR AFRICAN AMERICAN: >60
GFR NON-AFRICAN AMERICAN: >60 ML/MIN/1.73
GLUCOSE BLD-MCNC: 88 MG/DL (ref 74–99)
POTASSIUM SERPL-SCNC: 3.9 MMOL/L (ref 3.5–5)
SODIUM BLD-SCNC: 142 MMOL/L (ref 132–146)

## 2019-04-15 PROCEDURE — C9113 INJ PANTOPRAZOLE SODIUM, VIA: HCPCS | Performed by: FAMILY MEDICINE

## 2019-04-15 PROCEDURE — 36415 COLL VENOUS BLD VENIPUNCTURE: CPT

## 2019-04-15 PROCEDURE — 97110 THERAPEUTIC EXERCISES: CPT | Performed by: PHYSICAL THERAPIST

## 2019-04-15 PROCEDURE — 94660 CPAP INITIATION&MGMT: CPT

## 2019-04-15 PROCEDURE — 6360000002 HC RX W HCPCS: Performed by: FAMILY MEDICINE

## 2019-04-15 PROCEDURE — 94640 AIRWAY INHALATION TREATMENT: CPT

## 2019-04-15 PROCEDURE — 2580000003 HC RX 258: Performed by: FAMILY MEDICINE

## 2019-04-15 PROCEDURE — 1200000000 HC SEMI PRIVATE

## 2019-04-15 PROCEDURE — 6370000000 HC RX 637 (ALT 250 FOR IP): Performed by: FAMILY MEDICINE

## 2019-04-15 PROCEDURE — 2500000003 HC RX 250 WO HCPCS: Performed by: RADIOLOGY

## 2019-04-15 PROCEDURE — 99231 SBSQ HOSP IP/OBS SF/LOW 25: CPT | Performed by: SURGERY

## 2019-04-15 PROCEDURE — C1751 CATH, INF, PER/CENT/MIDLINE: HCPCS

## 2019-04-15 PROCEDURE — 80048 BASIC METABOLIC PNL TOTAL CA: CPT

## 2019-04-15 PROCEDURE — 36592 COLLECT BLOOD FROM PICC: CPT

## 2019-04-15 PROCEDURE — 97530 THERAPEUTIC ACTIVITIES: CPT | Performed by: PHYSICAL THERAPIST

## 2019-04-15 PROCEDURE — 36573 INSJ PICC RS&I 5 YR+: CPT | Performed by: RADIOLOGY

## 2019-04-15 PROCEDURE — 02HV33Z INSERTION OF INFUSION DEVICE INTO SUPERIOR VENA CAVA, PERCUTANEOUS APPROACH: ICD-10-PCS | Performed by: RADIOLOGY

## 2019-04-15 PROCEDURE — 97110 THERAPEUTIC EXERCISES: CPT

## 2019-04-15 PROCEDURE — 97535 SELF CARE MNGMENT TRAINING: CPT

## 2019-04-15 RX ORDER — LIDOCAINE HYDROCHLORIDE 10 MG/ML
10 INJECTION, SOLUTION INFILTRATION; PERINEURAL ONCE
Status: COMPLETED | OUTPATIENT
Start: 2019-04-15 | End: 2019-04-15

## 2019-04-15 RX ADMIN — AMLODIPINE BESYLATE 10 MG: 5 TABLET ORAL at 10:24

## 2019-04-15 RX ADMIN — LIDOCAINE HYDROCHLORIDE 10 ML: 10 INJECTION, SOLUTION INFILTRATION; PERINEURAL at 12:52

## 2019-04-15 RX ADMIN — HYDROCORTISONE: 25 CREAM TOPICAL at 10:24

## 2019-04-15 RX ADMIN — PAROXETINE HYDROCHLORIDE 10 MG: 10 TABLET, FILM COATED ORAL at 10:24

## 2019-04-15 RX ADMIN — HYDROCORTISONE: 25 CREAM TOPICAL at 22:51

## 2019-04-15 RX ADMIN — ALBUTEROL SULFATE 2.5 MG: 2.5 SOLUTION RESPIRATORY (INHALATION) at 08:05

## 2019-04-15 RX ADMIN — SIMETHICONE CHEW TAB 80 MG 80 MG: 80 TABLET ORAL at 22:46

## 2019-04-15 RX ADMIN — CLOPIDOGREL BISULFATE 75 MG: 75 TABLET ORAL at 10:24

## 2019-04-15 RX ADMIN — SIMETHICONE CHEW TAB 80 MG 80 MG: 80 TABLET ORAL at 10:32

## 2019-04-15 RX ADMIN — SIMETHICONE CHEW TAB 80 MG 80 MG: 80 TABLET ORAL at 05:36

## 2019-04-15 RX ADMIN — SIMETHICONE CHEW TAB 80 MG 80 MG: 80 TABLET ORAL at 17:08

## 2019-04-15 RX ADMIN — Medication 10 ML: at 13:15

## 2019-04-15 RX ADMIN — WATER 1 G: 1 INJECTION INTRAMUSCULAR; INTRAVENOUS; SUBCUTANEOUS at 13:14

## 2019-04-15 RX ADMIN — ALBUTEROL SULFATE 2.5 MG: 2.5 SOLUTION RESPIRATORY (INHALATION) at 11:01

## 2019-04-15 RX ADMIN — OXYBUTYNIN CHLORIDE 5 MG: 5 TABLET ORAL at 10:32

## 2019-04-15 RX ADMIN — ALBUTEROL SULFATE 2.5 MG: 2.5 SOLUTION RESPIRATORY (INHALATION) at 19:11

## 2019-04-15 RX ADMIN — PANTOPRAZOLE SODIUM 40 MG: 40 INJECTION, POWDER, FOR SOLUTION INTRAVENOUS at 13:14

## 2019-04-15 RX ADMIN — LEVOTHYROXINE SODIUM 88 MCG: 88 TABLET ORAL at 05:35

## 2019-04-15 RX ADMIN — MUPIROCIN: 20 OINTMENT TOPICAL at 10:24

## 2019-04-15 RX ADMIN — OXYBUTYNIN CHLORIDE 5 MG: 5 TABLET ORAL at 22:46

## 2019-04-15 RX ADMIN — POTASSIUM CHLORIDE AND SODIUM CHLORIDE: 450; 150 INJECTION, SOLUTION INTRAVENOUS at 05:35

## 2019-04-15 RX ADMIN — METOPROLOL SUCCINATE 50 MG: 25 TABLET, EXTENDED RELEASE ORAL at 10:24

## 2019-04-15 RX ADMIN — ATORVASTATIN CALCIUM 10 MG: 10 TABLET, FILM COATED ORAL at 22:46

## 2019-04-15 RX ADMIN — MUPIROCIN: 20 OINTMENT TOPICAL at 22:51

## 2019-04-15 ASSESSMENT — PAIN SCALES - GENERAL
PAINLEVEL_OUTOF10: 5
PAINLEVEL_OUTOF10: 0
PAINLEVEL_OUTOF10: 1

## 2019-04-15 NOTE — PROGRESS NOTES
Progress Note    History:  8 small amounts yesterday, decreased appetite, no nausea this AM. One episode of loose stool yesterday without abdominal pain or cramps. Stood at bedside and moved sideways for 1 or 2 steps, legs felt weak. Denies shortness of breath or cough, no chest pain or sensation palpitations. Intermittent headache without associated neurologic symptoms. Patient's medical and surgical history, medications and allergies were reviewed. Interval notes of consultants, nurses, therapists, and ancillary services were also reviewed, as well as the medical administration record. Physical Exam:  Vitals:    04/13/19 2356 04/14/19 0753 04/14/19 2042 04/14/19 2330   BP:  137/70 118/68 110/70   Pulse: 58 72 62 64   Resp:  16 18 18   Temp:  98 °F (36.7 °C) 98.8 °F (37.1 °C) 98.3 °F (36.8 °C)   TempSrc:  Oral Oral Oral   SpO2: 94%  92% 94%   Weight:       Height:           Color satisfactory, no icterus or cyanosis. Less exudate and erythema left periorbital. Lung aeration improved, mild adventitious noise left anterior, no wheezes or rales. Respirations nonlabored. Heart rhythm is regular, no S3 or S4 is heard, no murmurs, heart rate 56/m. Right foot function remains normal, right upper calf posterior lateral area wound appears to be healing, less serosanguineous drainage, induration without change, posterior calf remains softer. Assessment  ;  1. Staph bacteremia, species to be determined  2. Contusion right leg, open wound posterolateral calf with cellulitis, mobility impairment  3. Dehydration, viral pneumonitis with pulmonary atelectasis  4. Swallowing dysfunction, weight loss, anorexia and nausea, GERD, history of diverticulosis, supermorbid obesity  5. Essential hypertension, PACs  6.  Hypothyroidism on supplementation, hypercholesterolemia on statin, resolving hypokalemia    Discussed with the patient and her , blood cultures are now refilled trend staph species from both cultures, unsure whether this represents urinary origin or skin origin from open wound, maintain intravenous ceftriaxone for present with consideration to SMX/TMP. PICC line to maintain intravenous antibiotic access, discontinue intravenous fluid maintenance and follow oral fluid intake.  Continue therapy intervention, continue consideration to rehab setting following discharge pending her progress   Electronically by Daryle Milian, DO  on 4/15/19 at 7:47 AM

## 2019-04-15 NOTE — PLAN OF CARE
Problem: Falls - Risk of:  Goal: Will remain free from falls  Description  Will remain free from falls  Outcome: Met This Shift     Problem: Falls - Risk of:  Goal: Absence of physical injury  Description  Absence of physical injury  Outcome: Met This Shift     Problem: Risk for Impaired Skin Integrity  Goal: Tissue integrity - skin and mucous membranes  Description  Structural intactness and normal physiological function of skin and  mucous membranes.   Outcome: Met This Shift     Problem: Nutrition  Goal: Optimal nutrition therapy  Outcome: Met This Shift

## 2019-04-15 NOTE — PROGRESS NOTES
Occupational Therapy  O.T. Bedside Treatment Note    Date:4/15/2019  Patient Name: Chris Gottlieb  MRN: [id number]  : 1940  ROOM #: [id number]    Problem list / Diagnosis:   Patient Active Problem List   Diagnosis    Acute renal failure (ARF) ([address]    Pyelonephritis    Obstructive uropathy    HTN (hypertension)    Hyperlipidemia    Obstructive sleep apnea    Glaucoma    DJD (degenerative joint disease)    Morbid obesity ([facility] [address]    Kidney stone    Pneumonia    Moderate protein-calorie malnutrition ([address]     Past Medical History:   Past Medical History:   Diagnosis Date    Cataract     right eye    Cholecystitis with cholelithiasis     Diverticulitis     Diverticulosis     DJD (degenerative joint disease)     DVT (deep venous thrombosis) (HCC)     RLE    Fibroids     Glaucoma     HTN (hypertension)     Hyperlipidemia     states on med for precaution    Morbid obesity ([address]     GLENNA on CPAP     Osteoarthritis     PE (pulmonary embolism)     Renal cyst     right     Onset/Medical history: medical history reviewed  Past medical history: reviewed    The admitting diagnosis and active problem list as listed above have been reviewed prior to treatment. Nursing cleared the patient for treatment. Patient is agreeable to treatment.     Discharge recommendations: DINA   Equipment prescriptions needed: to be determined at rehab    [facility] Daily Activity Inpatient     AM-PAC Daily Activity Inpatient   How much help for putting on and taking off regular lower body clothing?:Total  How much help for Bathing?: A Lot  How much help for Toileting?: Total  How much help for putting on and taking off regular upper body clothing?: A Lot  How much help for taking care of personal grooming?: A Lot  How much help for eating meals?: A Little  AM-[facility] Inpatient Daily Activity Raw Score: 11  AM-PAC Inpatient ADL T-Scale Score : 29.04  ADL Inpatient CMS 0-100% Score: 70.42  ADL Inpatient CMS G-Code Modifier : provided  -Safety: fair (VC's for follow through)  - Pt/family education/training: BUE ROM ex's, ECT's, grooming  -Pt would benefit from additional ADLs, safety education, balance activities, transfer training, strength exercises, and over all endurance building.     P:  - Plan of care: Patient will be seen by OT 1-3x/wk  for therapeutic activity, ADL re-training, bed mobility,functional transfers, functional mobility, safety and fall prevention, balance and endurance activities, instruction and training in energy conservation principles, and   patient and family education.   - Patient and/or family understands diagnosis, prognosis and plan of care: yes   - ADL retraining, bathroom safety, DME    Treatment minutes: [address]

## 2019-04-15 NOTE — PROGRESS NOTES
GENERAL SURGERY  DAILY PROGRESS NOTE  4/15/2019    Chief Complaint   Patient presents with    Fatigue     Patient told to come to the ER by PCP because patient was to weak to get out of her car while at her PCP for her appointment today. Subjective:  States that she had a little nausea yesterday but it was not as bad as it was when she was admitted. Tolerating diet.      Objective:  /70   Pulse 64   Temp 98.3 °F (36.8 °C) (Oral)   Resp 18   Ht 5' 4\" (1.626 m)   Wt (!) 345 lb 3.2 oz (156.6 kg)   SpO2 94%   BMI 59.25 kg/m²     GENERAL:  Laying in bed, awake, alert, cooperative, no apparent distress  HEAD: Normocephalic, atraumatic  CARDIOVASCULAR:  RR  ABDOMEN:  Soft, non-tender, non-distended  EXTREMITIES: right lower extremity wound without cellulitis, granulation tissue at edges, no drainage  SKIN: Warm and dry    Assessment/Plan:  66 y.o. female with nausea and RLE wound    Bariatric LISE  Local wound care to RLE- no surgical intervention at this time    Electronically signed by Devorah Rock DO for Dr. Kendra Gamez MD on 4/15/2019 at 6:33 AM

## 2019-04-15 NOTE — PROGRESS NOTES
treatment. Patient is agreeable to treatment. Family was present and supportive. And assisted with standing  Pain status: (measured on a visual analog scale with 0=no pain and 10=excruciating pain) 0/10. O: Pt was instructed in and performed the following:   Bed Mobility- Supine to sit- Max a of 1    Scooting- Moderate assistance of 1    Sit to supine- Maximal assistance of 2     Transfers-sit to stand- /Maximal assistance of 2  X 2 reps   Gait: able to step backward with wheeled walker and mod a of 2; able to perform 2-3  Steps to head of bed  Mod a of 2      Treatment: Pt was educated and facilitated on techniques to increase safety and independence with bed mobility, balance, functional transfers, and functional mobility. Pt rolled x 2 reps for linen adjustment. Pt required moderate assist for rolling. Pt transferred to side of bed and sat up x 15 minutes to increase dynamic sitting balance and activity tolerance. Pt stood x 2 rep   Pt RTB  . Pt performed the following seated exercises with AROM: ankle pumps,  Shoulder flexion, bicep curls,  x 15 reps. Comment: Call light left by patient. Pt was returned to bed at end of treatment, alarm was activated. A: Pt had good tolerance to above treatment and able to progress   Pt fatigued quickly and was  able to stand for <1 minute   Pt is at risk of falls with decreased strength and endurance limiting function. P: Continue with physical therapy       Mar Pereyra PT    Goals to be met in 3 days. Bed mobility- Moderate assistance   Transfers- Moderate assistance   Ambulation- Maximal assistance for 5 feet using wheeled walker     Increase strength in affected muscle groups by 1/3 grade  Increase balance to allow for improvement towards functional goals. Increase endurance to allow for improvement towards functional goals.

## 2019-04-16 LAB — URINE CULTURE, ROUTINE: NORMAL

## 2019-04-16 PROCEDURE — 87040 BLOOD CULTURE FOR BACTERIA: CPT

## 2019-04-16 PROCEDURE — 6360000002 HC RX W HCPCS: Performed by: FAMILY MEDICINE

## 2019-04-16 PROCEDURE — 94640 AIRWAY INHALATION TREATMENT: CPT

## 2019-04-16 PROCEDURE — 6370000000 HC RX 637 (ALT 250 FOR IP): Performed by: FAMILY MEDICINE

## 2019-04-16 PROCEDURE — 2580000003 HC RX 258: Performed by: SPECIALIST

## 2019-04-16 PROCEDURE — 36592 COLLECT BLOOD FROM PICC: CPT

## 2019-04-16 PROCEDURE — 1200000000 HC SEMI PRIVATE

## 2019-04-16 PROCEDURE — 2580000003 HC RX 258: Performed by: FAMILY MEDICINE

## 2019-04-16 PROCEDURE — 97110 THERAPEUTIC EXERCISES: CPT

## 2019-04-16 PROCEDURE — 97530 THERAPEUTIC ACTIVITIES: CPT

## 2019-04-16 PROCEDURE — C9113 INJ PANTOPRAZOLE SODIUM, VIA: HCPCS | Performed by: FAMILY MEDICINE

## 2019-04-16 PROCEDURE — 36415 COLL VENOUS BLD VENIPUNCTURE: CPT

## 2019-04-16 PROCEDURE — 6360000002 HC RX W HCPCS: Performed by: SPECIALIST

## 2019-04-16 RX ADMIN — OXYBUTYNIN CHLORIDE 5 MG: 5 TABLET ORAL at 12:04

## 2019-04-16 RX ADMIN — ALBUTEROL SULFATE 2.5 MG: 2.5 SOLUTION RESPIRATORY (INHALATION) at 18:35

## 2019-04-16 RX ADMIN — CLOPIDOGREL BISULFATE 75 MG: 75 TABLET ORAL at 10:10

## 2019-04-16 RX ADMIN — SIMETHICONE CHEW TAB 80 MG 80 MG: 80 TABLET ORAL at 21:09

## 2019-04-16 RX ADMIN — AMLODIPINE BESYLATE 10 MG: 5 TABLET ORAL at 10:10

## 2019-04-16 RX ADMIN — SIMETHICONE CHEW TAB 80 MG 80 MG: 80 TABLET ORAL at 06:28

## 2019-04-16 RX ADMIN — SIMETHICONE CHEW TAB 80 MG 80 MG: 80 TABLET ORAL at 17:23

## 2019-04-16 RX ADMIN — VANCOMYCIN HYDROCHLORIDE 1250 MG: 5 INJECTION, POWDER, LYOPHILIZED, FOR SOLUTION INTRAVENOUS at 17:23

## 2019-04-16 RX ADMIN — ALBUTEROL SULFATE 2.5 MG: 2.5 SOLUTION RESPIRATORY (INHALATION) at 06:41

## 2019-04-16 RX ADMIN — HYDROCORTISONE: 25 CREAM TOPICAL at 21:10

## 2019-04-16 RX ADMIN — MUPIROCIN: 20 OINTMENT TOPICAL at 21:10

## 2019-04-16 RX ADMIN — LEVOTHYROXINE SODIUM 88 MCG: 88 TABLET ORAL at 06:27

## 2019-04-16 RX ADMIN — SIMETHICONE CHEW TAB 80 MG 80 MG: 80 TABLET ORAL at 12:04

## 2019-04-16 RX ADMIN — Medication 10 ML: at 10:11

## 2019-04-16 RX ADMIN — OXYBUTYNIN CHLORIDE 5 MG: 5 TABLET ORAL at 21:09

## 2019-04-16 RX ADMIN — HYDROCORTISONE: 25 CREAM TOPICAL at 10:10

## 2019-04-16 RX ADMIN — MUPIROCIN: 20 OINTMENT TOPICAL at 10:10

## 2019-04-16 RX ADMIN — WATER 1 G: 1 INJECTION INTRAMUSCULAR; INTRAVENOUS; SUBCUTANEOUS at 10:12

## 2019-04-16 RX ADMIN — METOPROLOL SUCCINATE 50 MG: 25 TABLET, EXTENDED RELEASE ORAL at 10:10

## 2019-04-16 RX ADMIN — PANTOPRAZOLE SODIUM 40 MG: 40 INJECTION, POWDER, FOR SOLUTION INTRAVENOUS at 10:10

## 2019-04-16 RX ADMIN — ATORVASTATIN CALCIUM 10 MG: 10 TABLET, FILM COATED ORAL at 21:09

## 2019-04-16 RX ADMIN — PAROXETINE HYDROCHLORIDE 10 MG: 10 TABLET, FILM COATED ORAL at 10:10

## 2019-04-16 RX ADMIN — ALBUTEROL SULFATE 2.5 MG: 2.5 SOLUTION RESPIRATORY (INHALATION) at 09:29

## 2019-04-16 ASSESSMENT — PAIN SCALES - GENERAL: PAINLEVEL_OUTOF10: 0

## 2019-04-16 NOTE — CARE COORDINATION
Received [facility] predict outcome report on patient. Report uploaded in EMR. Discharge plan aligns with recommendations of SNF.

## 2019-04-16 NOTE — PLAN OF CARE
Problem: Falls - Risk of:  Goal: Will remain free from falls  Description  Will remain free from falls  4/16/2019 1824 by Siddharth Shane RN  Outcome: Met This Shift     Problem: Falls - Risk of:  Goal: Absence of physical injury  Description  Absence of physical injury  4/16/2019 1824 by Siddharth Shane RN  Outcome: Met This Shift     Problem: Risk for Impaired Skin Integrity  Goal: Tissue integrity - skin and mucous membranes  Description  Structural intactness and normal physiological function of skin and  mucous membranes.   4/16/2019 1824 by Siddharth Shane RN  Outcome: Met This Shift     Problem: Nutrition  Goal: Optimal nutrition therapy  4/16/2019 1824 by Siddharth Shane RN  Outcome: Met This Shift

## 2019-04-16 NOTE — PROGRESS NOTES
[facility]  PROGRESS NOTE  Admit Date:  2019   NAME:  Kayy Macias  MRN:  [id number]  :  1940  Age:   66 y.o. PCP:   Reba Pink DO, Johanna Budds, [address] Day: Hospital Day: 5    Subjective:   Pt was seen and examined in a face to face fr le encounter for with CC of none  Chief Complaint   Patient presents with    Fatigue     Patient told to come to the ER by PCP because patient was to weak to get out of her car while at her PCP for her appointment today.       HPI: no f/c/n/v/d   present no questions  Has weakness    ROS:  CONSTITUTIONAL:   No fever, chills, weight loss, loss of appetite  ALLERGIES:    No urticaria, hay fever,    EYES:     No blurry vision, loss of vision,eye pain  ENT:      No hearing loss, sore throat  CARDIOVASCULAR:  No chest pain, palpitations  RESPIRATORY:    No cough, sob  HEME-LYMPH:   No easy bruising, bleeding  GI:     No nausea, vomiting or diarrhea  :     No urinary complaints  NEURO:    No  lightheadedness, dizziness, confusion,   MUSCULOSKELETAL:  No muscle aches, pain   SKIN:     No rash or itch  PSYCH:    No depression or anxiety      Scheduled Meds:   mupirocin   Topical BID    simethicone  80 mg Oral 4x Daily AC & HS    cloNIDine  1 patch Transdermal Weekly    hydrocortisone   Topical BID    pantoprazole  40 mg Intravenous Daily    And    sodium chloride (PF)  10 mL Intravenous Daily    cefTRIAXone (ROCEPHIN) IV  1 g Intravenous Q24H    albuterol  2.5 mg Nebulization TID    amLODIPine  10 mg Oral Daily    metoprolol succinate  50 mg Oral Daily    levothyroxine  88 mcg Oral Daily    atorvastatin  10 mg Oral Nightly    clopidogrel  75 mg Oral Daily    oxybutynin  5 mg Oral BID    nitroGLYCERIN  1 patch Transdermal Daily    PARoxetine  10 mg Oral Daily    nitroGLYCERIN   Transdermal Nightly    brinzolamide-brimonidine  1 drop Ophthalmic BID    bimatoprost  1 drop Left Eye Nightly     Continuous organisms    Final     Organism   Date Value Ref Range Status   04/12/2019 Staphylococcus epidermidis (A)  Preliminary   04/12/2019 Staphylococcus hominis ssp hominis (A)  Preliminary              RADIOLOGY:  IR FLUORO GUIDED CVA DEVICE PLMT/REPLACE/REMOVAL   Final Result   Successful placement of a 5 Greek double-lumen Power   PICC line using ultrasound guidance via the right basilic  vein. IR ULTRASOUND GUIDANCE VASCULAR ACCESS   Final Result   Ultrasound guidance was provided during placement of a   PICC line. CT CHEST W WO CONTRAST   Final Result   1. Small bibasilar subsegmental atelectasis/infiltrates            US DUP LOWER EXTREMITY RIGHT MIGUELITO   Final Result   No evidence of right lower extremity deep venous thrombus   from common femoral vein to proximal calf. XR CHEST 1 VW   Final Result        Lab Results   Component Value Date     04/15/2019    K 3.9 04/15/2019     04/15/2019    CO2 22 04/15/2019    BUN 8 04/15/2019    CREATININE 0.8 04/15/2019    GFRAA >60 04/15/2019    LABGLOM >60 04/15/2019    GLUCOSE 88 04/15/2019    GLUCOSE 105 05/24/2012    PROT 5.9 04/13/2019    CALCIUM 9.2 04/15/2019    BILITOT 1.4 04/13/2019    ALKPHOS 100 04/13/2019    AST 12 04/13/2019    ALT 5 04/13/2019     Assessment/Plan:   66 y.o. female presented with   Chief Complaint   Patient presents with    Fatigue     Patient told to come to the ER by PCP because patient was to weak to get out of her car while at her PCP for her appointment today. Cons bacteremia    Pneumonia? Moderate protein-calorie malnutrition ([address]      F/u blood cx  Add vanco   procalcitonin  IS  Echo  on ceftriaxone      Imaging and labs were reviewed per medical records and any ID pertinent labs were addressed with the patient.    SEE ORDERS      Electronically signed by Mira Spencer MD on 4/16/2019 at 2:15 PM

## 2019-04-16 NOTE — PROGRESS NOTES
P Quality Flow/Interdisciplinary Rounds Progress Note        Quality Flow Rounds held on April 16, 2019    Disciplines Attending:  Bedside Nurse, ,  and Nursing Unit Leadership    Gilford Splinter was admitted on 4/12/2019  3:14 PM    Anticipated Discharge Date:  Expected Discharge Date: 04/16/19    Disposition:    Francisco J Score:  Francisco J Scale Score: 17    Readmission Risk              Risk of Unplanned Readmission:        14           Discussed patient goal for the day, patient clinical progression, and barriers to discharge.   The following Goal(s) of the Day/Commitment(s) have been identified:  BC+, IV ATX, ID on      Medina García  April 16, 2019

## 2019-04-16 NOTE — PROGRESS NOTES
Progress Note    History:  Continued discomfort and right calf region and sensation leg will buckle with weightbearing, which she continues to delineate as between the ankle and the knee rather than in either joint itself. No foot symptoms. Started at side of bed and shuffled yesterday. Denies cough or shortness of breath, no chest pain or sensation of palpitations. Late last p.m. had severe nausea with no emesis following ingestion of carbonated beverage. No further nausea since. Small bowel movement late last p.m. without diarrhea. No abdominal pain or cramps. Appetite is fair this morning. Patient's medical and surgical history, medications and allergies were reviewed. Interval notes of consultants, nurses, therapists, and ancillary services were also reviewed, as well as the medical administration record. Physical Exam:  Vitals:    04/14/19 2330 04/15/19 0833 04/15/19 1600 04/15/19 2000   BP: 110/70 (!) 146/69 138/72 130/61   Pulse: 64 68 60 61   Resp: 18 20 18 18   Temp: 98.3 °F (36.8 °C) 98.2 °F (36.8 °C) 98 °F (36.7 °C) 98 °F (36.7 °C)   TempSrc: Oral Oral Oral Oral   SpO2: 94% 93% 95% 94%   Weight:       Height:           No icterus, no cyanosis. Sensorium is clear. No rales or wheezes to lung auscultation, respirations are nonlabored with slightly attenuated likely secondary to diaphragmatic excursion. Heart rhythm is regular, no S3 or S4 is heard, tones are satisfactory, heart rate is 68/m. Abdomen is soft with active bowel sounds, obese. Right calf remains softer, brawny skin discoloration as with you following ecchymosis and resolving soft tissue infection, stellate wound with no drainage present, early granulation present, induration circumferentially around wound for 2 cm. Foot function is normal. Less edema right hand dorsum with resolving ecchymosis. Assessment/Plan:   1.  Bacteremia due to staph hominis and staph epidermidis, presumably from open wound right calf sustained March 15, resolving cellulitis  2. Contusion right leg, mobility impairment  3. Dehydration, viral bronchopneumonitis resolved, pulmonary atelectasis  4. Joint dysfunction, weight loss, anorexia and recurrent nausea, GERD, history of diverticulosis,  supermorbid obesity  5. Essential hypertension, PACs  6. Hypothyroidism, hypercholesterolemia, resolved hypokalemia  Discussed with the patient and spouse, blood cultures with staph hominis and staph epidermidis, on ceftriaxone since admission, PICC line placed yesterday in event long-term antibiotic therapy warranted, bacteremia source suspected secondary to right leg wound. Zachariah Rutledge discussion again held with patient as regards her mobility capabilities and seeking rehab setting placement for further therapy prior to her return home.   Electronically by Becky Almanza DO  on 4/16/19 at 8:32 AM

## 2019-04-16 NOTE — CONSULTS
Consults     Infectious Disease Consult Note     Admit Date: 4/12/2019  3:14 PM    Chief complaint: rt. Leg pain     Reason for Consult:  Rt. Leg infected ulcer     Requesting Physician:  Mary Alice Zafar DO      HISTORY OF PRESENT ILLNESS:    This 70-year-old female was admitted  through emergency room with dehydration and mobility impairment. The  patient relates that through the winter months, she has experienced a  fair degree of appetite loss and states that she has actually lost  approximately 50 pounds in this time period without conscious effort,  associated with intermittent nausea and occasional difficulty with  swallowing certain foods with sensation that they are stuck. Bowel  movements vary from constipation to yellow and loose, but generally not  more than one stool per day, no hematochezia or melena. She denies  abdominal pain or cramps, no substantial distention. Approximately one  month prior to admission, she sustained a fall in a parking lot at a  [employer], landing on her right leg with subsequent inability to  achieve her prior level of functional mobility. The leg became very  swollen, black and blue with a small wound on the posterior aspect. Since that time, she is unable to bear satisfactory weight on the leg  with a sensation of buckling which she delineates between her ankle and  knee rather than the knee joint itself. There are no foot symptoms. The day of admission, she was unable to get out of her car despite  maximum assistance and was subsequently transported to emergency room  for evaluation, and subsequent admission was carried out. REVIEW OF SYSTEMS:    Constitutional:no Fever, chills or rigors. No unexplained weight loss. HEENT: no headache, dizziness or lightheadedness. No sore throat or runny nose. Respiratory: no cough, chest pain, shortness of breath or wheeze.   Cardiovascular: no chest pain or palpitations  Gastrointestinal: no nausea, vomiting, diarrhea, constipation. No blood in stool. Genitourinary: no dysuria, hematuria, urgency, frequency or incontinence. Musculoskeletal: no joint pain anywhere in body, or bodyache. Neurologic: no h/o passing out, focal weakness or seizure. Skin: no h/o rashes or easy bruising. Hematologic: no gum bleeding or easy bruising. No hemoptysis. MEDICAL HISTORY  Past Medical History:   Diagnosis Date    Cataract     right eye    Cholecystitis with cholelithiasis     Diverticulitis     Diverticulosis     DJD (degenerative joint disease)     DVT (deep venous thrombosis) (HCC)     RLE    Fibroids     Glaucoma     HTN (hypertension)     Hyperlipidemia     states on med for precaution    Morbid obesity ([facility]     GLENNA on CPAP     Osteoarthritis     PE (pulmonary embolism)     Renal cyst     right      Immunization History   Administered Date(s) Administered    Influenza, High Dose (Fluzone 65 yrs and older) 10/17/2018    Pneumococcal 13-valent Conjugate (Lorita Ready) 10/17/2018     Past Surgical History:   Procedure Laterality Date    CATARACT REMOVAL WITH IMPLANT  12/2011    left eye    CHOLECYSTECTOMY, LAPAROSCOPIC  1995    COLONOSCOPY      CYSTOSCOPY  jun 2012    cystoscopy, right ureteroscopy with retrograde pyelograms, stone manipulation, stent insertion, laser lithotripsy with holmium laser    HYSTERECTOMY      [place] GLAUCOMA SURG,TRABECU AB EXTERNO Right 11/15/2018    RIGHT EYE REVISION OF XEN EYE STENT, TRABECULECTOMY performed by Echo Whiting MD at [address] [place] [address]    for RLE DVT     FAMILY HISTORY  family history includes COPD in her father; Cancer in her brother; Coronary Art Dis in her father; Heart Attack (age of onset: 62) in her mother.   SOCIAL HISTORY  Social History     Socioeconomic History    Marital status:      Spouse name: Not on file    Number of children: Not on file    Years of education: Not on file    Highest education level: Not on file   Occupational History    Occupation: parminder     Comment: retired 1985   Social Needs    Financial resource strain: Not on file    Food insecurity:     Worry: Not on file     Inability: Not on file   eziCONEX needs:     Medical: Not on file     Non-medical: Not on file   Tobacco Use    Smoking status: Former Smoker     Packs/day: 1.00     Years: 30.00     Pack years: 30.00     Last attempt to quit: 1991     Years since quittin.9    Smokeless tobacco: Never Used   Substance and Sexual Activity    Alcohol use: No    Drug use: No    Sexual activity: Not on file   Lifestyle    Physical activity:     Days per week: Not on file     Minutes per session: Not on file    Stress: Not on file   Relationships    Social connections:     Talks on phone: Not on file     Gets together: Not on file     Attends Bahai service: Not on file     Active member of club or organization: Not on file     Attends meetings of clubs or organizations: Not on file     Relationship status: Not on file    Intimate partner violence:     Fear of current or ex partner: Not on file     Emotionally abused: Not on file     Physically abused: Not on file     Forced sexual activity: Not on file   Other Topics Concern    Not on file   Social History Narrative    Not on file         Current Medications:     Scheduled Meds:   mupirocin   Topical BID    simethicone  80 mg Oral 4x Daily AC & HS    cloNIDine  1 patch Transdermal Weekly    hydrocortisone   Topical BID    pantoprazole  40 mg Intravenous Daily    And    sodium chloride (PF)  10 mL Intravenous Daily    cefTRIAXone (ROCEPHIN) IV  1 g Intravenous Q24H    albuterol  2.5 mg Nebulization TID    amLODIPine  10 mg Oral Daily    metoprolol succinate  50 mg Oral Daily    levothyroxine  88 mcg Oral Daily    atorvastatin  10 mg Oral Nightly    clopidogrel  75 mg Oral Daily    oxybutynin  5 mg Oral BID --   --    ALKPHOS 100  --   --    AST 12  --   --    ALT 5  --   --        BLOOD CX #1  No results for input(s): BC in the last 72 hours. BLOOD CX #2  No results for input(s): Delnatividade Cassie in the last 72 hours. WOUND culture  No results for input(s): WNDABS in the last 72 hours. URINE CULTURE  Recent Labs     04/13/19  2237   LABURIN <10,000 CFU/mL  Mixed gram positive organisms           ASSESSMENT & PLAN    Patient examined patient with morbid obesity she claims she has lost about 50 pound weight in last 6 months now in the hospital with immobility unable to move she appears to be dehydrated and weak her right leg examination shows some contusion but no open lesions no cellulitis no active infection we will follow the patient off antibiotics      Thank you for consult.       Parker Harding MD, FACP  4/16/2019  5:01 AM

## 2019-04-17 LAB
LV EF: 70 %
LVEF MODALITY: NORMAL
PROCALCITONIN: 0.06 NG/ML (ref 0–0.08)

## 2019-04-17 PROCEDURE — 2580000003 HC RX 258: Performed by: FAMILY MEDICINE

## 2019-04-17 PROCEDURE — 97112 NEUROMUSCULAR REEDUCATION: CPT

## 2019-04-17 PROCEDURE — 36592 COLLECT BLOOD FROM PICC: CPT

## 2019-04-17 PROCEDURE — 36415 COLL VENOUS BLD VENIPUNCTURE: CPT

## 2019-04-17 PROCEDURE — 94640 AIRWAY INHALATION TREATMENT: CPT

## 2019-04-17 PROCEDURE — 1200000000 HC SEMI PRIVATE

## 2019-04-17 PROCEDURE — 6360000002 HC RX W HCPCS: Performed by: FAMILY MEDICINE

## 2019-04-17 PROCEDURE — 93306 TTE W/DOPPLER COMPLETE: CPT

## 2019-04-17 PROCEDURE — C9113 INJ PANTOPRAZOLE SODIUM, VIA: HCPCS | Performed by: FAMILY MEDICINE

## 2019-04-17 PROCEDURE — 97530 THERAPEUTIC ACTIVITIES: CPT

## 2019-04-17 PROCEDURE — 6370000000 HC RX 637 (ALT 250 FOR IP): Performed by: FAMILY MEDICINE

## 2019-04-17 PROCEDURE — 84145 PROCALCITONIN (PCT): CPT

## 2019-04-17 RX ORDER — PANTOPRAZOLE SODIUM 40 MG/1
40 TABLET, DELAYED RELEASE ORAL
Status: DISCONTINUED | OUTPATIENT
Start: 2019-04-18 | End: 2019-04-23 | Stop reason: HOSPADM

## 2019-04-17 RX ORDER — HEPARIN SODIUM (PORCINE) LOCK FLUSH IV SOLN 100 UNIT/ML 100 UNIT/ML
300 SOLUTION INTRAVENOUS 2 TIMES DAILY
Status: DISCONTINUED | OUTPATIENT
Start: 2019-04-17 | End: 2019-04-23 | Stop reason: HOSPADM

## 2019-04-17 RX ADMIN — PAROXETINE HYDROCHLORIDE 10 MG: 10 TABLET, FILM COATED ORAL at 08:56

## 2019-04-17 RX ADMIN — SIMETHICONE CHEW TAB 80 MG 80 MG: 80 TABLET ORAL at 06:44

## 2019-04-17 RX ADMIN — ALBUTEROL SULFATE 2.5 MG: 2.5 SOLUTION RESPIRATORY (INHALATION) at 18:47

## 2019-04-17 RX ADMIN — HYDROCORTISONE: 25 CREAM TOPICAL at 09:01

## 2019-04-17 RX ADMIN — Medication 300 UNITS: at 21:39

## 2019-04-17 RX ADMIN — ATORVASTATIN CALCIUM 10 MG: 10 TABLET, FILM COATED ORAL at 21:13

## 2019-04-17 RX ADMIN — MUPIROCIN: 20 OINTMENT TOPICAL at 09:01

## 2019-04-17 RX ADMIN — AMLODIPINE BESYLATE 10 MG: 5 TABLET ORAL at 08:56

## 2019-04-17 RX ADMIN — MUPIROCIN: 20 OINTMENT TOPICAL at 21:13

## 2019-04-17 RX ADMIN — WATER 1 G: 1 INJECTION INTRAMUSCULAR; INTRAVENOUS; SUBCUTANEOUS at 08:56

## 2019-04-17 RX ADMIN — CLOPIDOGREL BISULFATE 75 MG: 75 TABLET ORAL at 08:56

## 2019-04-17 RX ADMIN — SIMETHICONE CHEW TAB 80 MG 80 MG: 80 TABLET ORAL at 16:57

## 2019-04-17 RX ADMIN — SIMETHICONE CHEW TAB 80 MG 80 MG: 80 TABLET ORAL at 11:37

## 2019-04-17 RX ADMIN — HYDROCORTISONE: 25 CREAM TOPICAL at 21:14

## 2019-04-17 RX ADMIN — SIMETHICONE CHEW TAB 80 MG 80 MG: 80 TABLET ORAL at 21:13

## 2019-04-17 RX ADMIN — ALBUTEROL SULFATE 2.5 MG: 2.5 SOLUTION RESPIRATORY (INHALATION) at 10:22

## 2019-04-17 RX ADMIN — ALBUTEROL SULFATE 2.5 MG: 2.5 SOLUTION RESPIRATORY (INHALATION) at 06:22

## 2019-04-17 RX ADMIN — OXYBUTYNIN CHLORIDE 5 MG: 5 TABLET ORAL at 08:56

## 2019-04-17 RX ADMIN — METOPROLOL SUCCINATE 50 MG: 25 TABLET, EXTENDED RELEASE ORAL at 08:56

## 2019-04-17 RX ADMIN — Medication 10 ML: at 08:56

## 2019-04-17 RX ADMIN — PANTOPRAZOLE SODIUM 40 MG: 40 INJECTION, POWDER, FOR SOLUTION INTRAVENOUS at 08:56

## 2019-04-17 RX ADMIN — OXYBUTYNIN CHLORIDE 5 MG: 5 TABLET ORAL at 21:13

## 2019-04-17 RX ADMIN — LEVOTHYROXINE SODIUM 88 MCG: 88 TABLET ORAL at 06:44

## 2019-04-17 ASSESSMENT — PAIN SCALES - GENERAL
PAINLEVEL_OUTOF10: 0
PAINLEVEL_OUTOF10: 0

## 2019-04-17 NOTE — CARE COORDINATION
[address] Transitions Interview     2019    Patient: Sharif Romo Patient : 1940   MRN: [id number]  Reason for Admission: Sepsis  RARS: Readmission Risk Score: 14         Spoke with: Serge Vargas. Chelsey Roberson (Patient) and  Geovanna Benson)       Readmission Risk  Patient Active Problem List   Diagnosis    Acute [facility] failure ([facility]F) ([facility]    Pyelonephritis    Obstructive uropathy    HTN (hypertension)    Hyperlipidemia    Obstructive sleep apnea    Glaucoma    DJD (degenerative joint disease)    Morbid obesity ([facility]    Kidney stone    Pneumonia    Moderate protein-calorie malnutrition [facility])       Inpatient Assessment  Care Transitions Summary    Care Transitions Inpatient Review  Medication Review  Are you able to afford your medications?:  Yes  How often do you have difficulty taking your medications?:  I always take them as prescribed. Housing Review  Who do you live with?:  Partner/Spouse/SO  Are you an active caregiver in your home?:  No  Social Support  Do you have a ?:  No  Do you have a [address] [place] [address]?:  No  Durable Medical Equipment  Patient DME:  [facility] chair, Other  Other Patient DME:  w/c ramp on front of home  Patient Home Equipment:  CPAP  Functional Review  Ability to seek help/take action for Emergent/Urgent situations i.e. fire, crime, inclement weather or health crisis.:  Needs Assistance  Ability handle personal hygiene needs (bathing/dressing/grooming):  Needs Assistance  Ability to manage medications:   Independent  Ability to prepare food:  Needs Assistance  Ability to maintain home (clean home, laundry):  Needs Assistance  Ability to drive and/or has transportation:  Needs Assistance  Ability to do shopping:  Needs Assistance  Ability to manage finances:  Needs Assistance  Is patient able to live independently?:  No  Hearing and Vision  Visual Impairment:  Visual impairment (Glasses/contacts)  Hearing Impairment:  None  Care Eric 78.  Received [facility] predict outcome report which aligns with discharge plan. Provided copy and reviewed recommendations for SNF. Confirmed plan is to discharge to [address] [place] [address] upon discharge. Per  note, patient has been accepted to [facility]. Denies any needs or concerns at this time. CTC will remain available for any needed assistance. Follow Up  No future appointments.     Health Maintenance  Health Maintenance Due   Topic Date Due    DEXA (modify frequency per FRAX score)  05/21/2005       EZEKIEL Gregory

## 2019-04-17 NOTE — DISCHARGE INSTR - COC
***    Readmission Risk Assessment Score:  Readmission Risk              Risk of Unplanned Readmission:        14           Discharging to Facility/ Agency   · [address]  · Address:  · Phone:[address]  · [place]    Dialysis Facility (if applicable)   · Name:  · Address:  · Dialysis Schedule:  · Phone:  · Fax:    / signature: Electronically signed by Joe St. Martín Vieyra on 4/17/19 at 10:44 AM    PHYSICIAN SECTION    Prognosis: Good    Condition at Discharge: Stable    Rehab Potential (if transferring to Rehab): Good    Recommended Labs or Other Treatments After Discharge: ***    Physician Certification: I certify the above information and transfer of Chris Gottlieb  is necessary for the continuing treatment of the diagnosis listed and that she requires [facility] for greater 30 days.      Update Admission H&P: {CHP DME Changes in PJXUJ:[id number]}    PHYSICIAN SIGNATURE:  {Rashida:[id number]}

## 2019-04-17 NOTE — PLAN OF CARE
Problem: Falls - Risk of:  Goal: Will remain free from falls  Description  Will remain free from falls  4/17/2019 1139 by Ernesto Carreon RN  Outcome: Met This Shift     Problem: Falls - Risk of:  Goal: Absence of physical injury  Description  Absence of physical injury  4/17/2019 1139 by Ernesto Carreon RN  Outcome: Met This Shift     Problem: Risk for Impaired Skin Integrity  Goal: Tissue integrity - skin and mucous membranes  Description  Structural intactness and normal physiological function of skin and  mucous membranes.   4/17/2019 1139 by Ernesto Carreon RN  Outcome: Met This Shift     Problem: Nutrition  Goal: Optimal nutrition therapy  4/17/2019 1139 by Ernesto Carreon RN  Outcome: Met This Shift

## 2019-04-17 NOTE — PLAN OF CARE
Problem: Falls - Risk of:  Goal: Will remain free from falls  Description  Will remain free from falls  4/17/2019 0456 by Bella Rojo RN  Outcome: Met This Shift     Problem: Falls - Risk of:  Goal: Absence of physical injury  Description  Absence of physical injury  4/17/2019 0456 by Bella Rojo RN  Outcome: Met This Shift     Problem: Risk for Impaired Skin Integrity  Goal: Tissue integrity - skin and mucous membranes  Description  Structural intactness and normal physiological function of skin and  mucous membranes.   4/17/2019 0456 by Bella Rojo RN  Outcome: Met This Shift     Problem: Nutrition  Goal: Optimal nutrition therapy  4/17/2019 0456 by Bella Rojo RN  Outcome: Met This Shift

## 2019-04-17 NOTE — PROGRESS NOTES
Progress Note    History:  Some discomfort right calf area, stood at bedside yesterday with assistance but states that she did not walk or shuffle, did her exercises in bed and sitting at bedside, no actual buckling or giving out of leg admitted. Appetite is stated absent but  states she ate well, no nausea or reflux, no indigestion or heartburn, no abdominal pain or cramps. Passing flatus, last bowel movement late Monday night. Denies urinary complaints to systems review. No shortness of breath or chest pain, no sensation of palpitations, no cough or production. Patient's medical and surgical history, medications and allergies were reviewed. Interval notes of consultants, nurses, therapists, and ancillary services were also reviewed, as well as the medical administration record. Physical Exam:  Vitals:    04/15/19 1600 04/15/19 2000 04/16/19 0927 04/16/19 2030   BP: 138/72 130/61 (!) 107/58 (!) 117/54   Pulse: 60 61 61 57   Resp: 18 18 18 18   Temp: 98 °F (36.7 °C) 98 °F (36.7 °C) 98.1 °F (36.7 °C) 98.1 °F (36.7 °C)   TempSrc: Oral Oral Oral Oral   SpO2: 95% 94% 94% 92%   Weight:       Height:         Sensorium is clear, no abnormal ideation. No icterus or cyanosis. Lung auscultation is clear, no rale or wheezes heard, respirations nonlabored 8/m. Heart rhythm is regular, no S3 or S4 is appreciated, tones are without change, heart rate is 68/m. Abdomen is soft, obese, active bowel sounds present, tenderness denied. The following ecchymoses to the dorsum of both hands and the left upper extremity. Nearly resolved collar distribution rash anterior chest. Rate knee without effusion or ecchymosis. Right foot function normal with intact circulation. Brownish discoloration lateral and posterior calf with mild discomfort mid and upper posterior calf to palpation. 2 cm induration around stellate wound rate upper posterior lateral calf, much less drainage, early granulation present.     Assessment/Plan: 1. Bacteremia due to staph hominis and staph epidermidis, open wound right calf sustain March 15, resolving cellulitis right leg  2. contusion right leg, mobility impairment  3. dehydration, resolved viral bronchial pneumonitis, pulmonary atelectasis  4. swallowing dysfunction, weight loss, anorexia with recurrent nausea, GERD, history of diverticulosis, supermorbid obesity  5. Essential hypertension, PACs  6.  Resolved hypokalemia, hypothyroidism and hypercholesterolemia on treatment  Discussed with the patient and her , vancomycin added to antibiotic regimen by ID service, repeat blood cultures performed, continue therapy intervention and pending her progress with same anticipate rehab setting at discharge  Electronically by Caryle Beams, DO  on 4/17/19 at 9:35 AM

## 2019-04-17 NOTE — PROGRESS NOTES
bed  Skin: Warm and dry. No rashes were noted. HEENT: Eyes show round, and reactive pupils. Neck: Supple to movements. Chest: No use of accessory muscles to breathe. Symmetrical expansion. Cardiovascular: S1 and S2 are rhythmic and regular. Abdomen: Positive bowel sounds to auscultation. Benign to palpation. Extremities: No clubbing, no cyanosis, edema. rle dressed  CNS: awake and alert  Psych:  pleasant  Lines: picc4/12    I & O - 24hr:    Intake/Output Summary (Last 24 hours) at 4/17/2019 1349  Last data filed at 4/17/2019 0815  Gross per 24 hour   Intake 480 ml   Output 550 ml   Net -70 ml     I/O this shift:  In: 360 [P.O.:360]  Out: 100 [Urine:100]   Weight change:   LABS:    No results for input(s): WBC, HGB, HCT, MCV, PLT in the last 72 hours. Recent Labs     04/15/19  0805      K 3.9   *   CO2 22   BUN 8   CREATININE 0.8   GFRAA >60   LABGLOM >60   GLUCOSE 88   CALCIUM 9.2        Lab Results   Component Value Date    SEDRATE 41 (H) 05/21/2012    SEDRATE 55 (H) 05/15/2012    SEDRATE 121 (H) 05/05/2012     Lab Results   Component Value Date    CRP <0.4 05/21/2012    CRP <0.4 05/15/2012        MICROBIOLOGY:         Organism   Date Value Ref Range Status   04/12/2019 Staphylococcus epidermidis (A)  Preliminary     URINECULTURE  Urine Culture, Routine   Date Value Ref Range Status   04/13/2019 <10,000 CFU/mL  Mixed gram positive organisms    Final     Organism   Date Value Ref Range Status   04/12/2019 Staphylococcus epidermidis (A)  Preliminary   04/12/2019 Staphylococcus hominis ssp hominis (A)  Preliminary              RADIOLOGY:  IR FLUORO GUIDED CVA DEVICE PLMT/REPLACE/REMOVAL   Final Result   Successful placement of a 5 Romansh double-lumen Power   PICC line using ultrasound guidance via the right basilic  vein. IR ULTRASOUND GUIDANCE VASCULAR ACCESS   Final Result   Ultrasound guidance was provided during placement of a   PICC line.       CT CHEST W WO CONTRAST

## 2019-04-17 NOTE — CARE COORDINATION
SS NOTE: [facility] has accepted pt for admission. They are requesting to know if pt will transfer the with IV ABX. For the SNF stay pt has completed a 3 day qualifying stay here. Pt will need signed DELIA, current PT/OT notes and SW completed the HENs. Luz Villanueva. 4/17/2019.10:46AM.

## 2019-04-17 NOTE — PROGRESS NOTES
above with 2 assist for BLE's. Bed put in Trendelenberg to pull up. Comment: Pt. More fearful today.  present and supportive. Comment: Call light left by patient. A: Ada. Well, fearful of falling. Pt with weak BLE's for standing. P: Continue with physical therapy   Dian Gregory, PARVIZ       Goals to be met in 3 days. Bed mobility- Moderate assistance   Transfers- Moderate assistance   Ambulation- Maximal assistance for 5 feet using wheeled walker     Increase strength in affected muscle groups by 1/3 grade  Increase balance to allow for improvement towards functional goals.   Increase endurance to allow for improvement towards functional goals.

## 2019-04-18 PROCEDURE — 6360000002 HC RX W HCPCS: Performed by: SPECIALIST

## 2019-04-18 PROCEDURE — 36415 COLL VENOUS BLD VENIPUNCTURE: CPT

## 2019-04-18 PROCEDURE — 94640 AIRWAY INHALATION TREATMENT: CPT

## 2019-04-18 PROCEDURE — 36592 COLLECT BLOOD FROM PICC: CPT

## 2019-04-18 PROCEDURE — 6370000000 HC RX 637 (ALT 250 FOR IP): Performed by: FAMILY MEDICINE

## 2019-04-18 PROCEDURE — 97116 GAIT TRAINING THERAPY: CPT

## 2019-04-18 PROCEDURE — 94150 VITAL CAPACITY TEST: CPT

## 2019-04-18 PROCEDURE — 97530 THERAPEUTIC ACTIVITIES: CPT

## 2019-04-18 PROCEDURE — 87040 BLOOD CULTURE FOR BACTERIA: CPT

## 2019-04-18 PROCEDURE — 2580000003 HC RX 258: Performed by: SPECIALIST

## 2019-04-18 PROCEDURE — 97110 THERAPEUTIC EXERCISES: CPT

## 2019-04-18 PROCEDURE — 2580000003 HC RX 258: Performed by: FAMILY MEDICINE

## 2019-04-18 PROCEDURE — 6360000002 HC RX W HCPCS: Performed by: FAMILY MEDICINE

## 2019-04-18 PROCEDURE — 94660 CPAP INITIATION&MGMT: CPT

## 2019-04-18 PROCEDURE — 1200000000 HC SEMI PRIVATE

## 2019-04-18 RX ADMIN — HYDROCORTISONE: 25 CREAM TOPICAL at 09:09

## 2019-04-18 RX ADMIN — Medication 300 UNITS: at 22:56

## 2019-04-18 RX ADMIN — PAROXETINE HYDROCHLORIDE 10 MG: 10 TABLET, FILM COATED ORAL at 09:04

## 2019-04-18 RX ADMIN — OXYBUTYNIN CHLORIDE 5 MG: 5 TABLET ORAL at 09:04

## 2019-04-18 RX ADMIN — SIMETHICONE CHEW TAB 80 MG 80 MG: 80 TABLET ORAL at 17:19

## 2019-04-18 RX ADMIN — MUPIROCIN: 20 OINTMENT TOPICAL at 09:08

## 2019-04-18 RX ADMIN — ALBUTEROL SULFATE 2.5 MG: 2.5 SOLUTION RESPIRATORY (INHALATION) at 05:02

## 2019-04-18 RX ADMIN — LEVOTHYROXINE SODIUM 88 MCG: 88 TABLET ORAL at 05:25

## 2019-04-18 RX ADMIN — PANTOPRAZOLE SODIUM 40 MG: 40 TABLET, DELAYED RELEASE ORAL at 05:25

## 2019-04-18 RX ADMIN — SIMETHICONE CHEW TAB 80 MG 80 MG: 80 TABLET ORAL at 12:07

## 2019-04-18 RX ADMIN — METOPROLOL SUCCINATE 50 MG: 25 TABLET, EXTENDED RELEASE ORAL at 09:04

## 2019-04-18 RX ADMIN — CLOPIDOGREL BISULFATE 75 MG: 75 TABLET ORAL at 09:04

## 2019-04-18 RX ADMIN — WATER 1 G: 1 INJECTION INTRAMUSCULAR; INTRAVENOUS; SUBCUTANEOUS at 09:03

## 2019-04-18 RX ADMIN — VANCOMYCIN HYDROCHLORIDE 1250 MG: 5 INJECTION, POWDER, LYOPHILIZED, FOR SOLUTION INTRAVENOUS at 00:02

## 2019-04-18 RX ADMIN — MUPIROCIN: 20 OINTMENT TOPICAL at 20:02

## 2019-04-18 RX ADMIN — SIMETHICONE CHEW TAB 80 MG 80 MG: 80 TABLET ORAL at 20:02

## 2019-04-18 RX ADMIN — VANCOMYCIN HYDROCHLORIDE 1250 MG: 10 INJECTION, POWDER, LYOPHILIZED, FOR SOLUTION INTRAVENOUS at 20:02

## 2019-04-18 RX ADMIN — ATORVASTATIN CALCIUM 10 MG: 10 TABLET, FILM COATED ORAL at 20:02

## 2019-04-18 RX ADMIN — Medication 300 UNITS: at 09:03

## 2019-04-18 RX ADMIN — AMLODIPINE BESYLATE 10 MG: 5 TABLET ORAL at 09:04

## 2019-04-18 RX ADMIN — HYDROCORTISONE: 25 CREAM TOPICAL at 20:02

## 2019-04-18 RX ADMIN — SIMETHICONE CHEW TAB 80 MG 80 MG: 80 TABLET ORAL at 05:25

## 2019-04-18 RX ADMIN — OXYBUTYNIN CHLORIDE 5 MG: 5 TABLET ORAL at 20:02

## 2019-04-18 ASSESSMENT — PAIN SCALES - GENERAL: PAINLEVEL_OUTOF10: 0

## 2019-04-18 NOTE — PROGRESS NOTES
Physical Therapy  Physical Therapy  Daily Treatment Note  4/18/2019  [id number]                      Uri Roque   [id number]                              1940    Patient Active Problem List   Diagnosis    [facility] ([facility]) [facility]    Pyelonephritis    Obstructive uropathy    HTN (hypertension)    Hyperlipidemia    Obstructive sleep apnea    Glaucoma    DJD (degenerative joint disease)    Morbid obesity ([facility]    Kidney stone    Pneumonia    [facility] protein[facility]calorie malnutrition [facility]       Recommendation for discharge: Subacute  Equipment prescriptions needed: none    AM-[facility] Mobility Inpatient   How much difficulty turning over in bed?: A Little  How much difficulty sitting down on / standing up from a chair with arms?: A Lot  How much difficulty moving from lying on back to sitting on side of bed?: A Lot  How much help from another person moving to and from a bed to a chair?: A Lot  How much help from another person needed to walk in hospital room?: A Lot  How much help from another person for climbing 3-5 steps with a railing?: Total  AM-PAC Inpatient Mobility Raw Score : 12  AM-PAC Inpatient T-Scale Score : 35.33  Mobility Inpatient CMS 0-100% Score: 68.66  Mobility Inpatient CMS G-Code Modifier : CL      Precautions: falls, alarm    S: Patient cleared by nursing for treatment. Patient is agreeable to treatment. Pt c/o pain in L knee. Pain status: (measured on a visual analog scale with 0=no pain and 10=excruciating pain) no number given/10. O: Pt was instructed in and performed the following:   Bed Mobility- Supine to sit- Minimal assist x 2     Scooting- Minimal assist x 2   Sit to supine- Minimal assist x 2   Transfers-sit to stand- Minimal assist x 2   Gait: Pt ambulated 2 feet towards [facility] using wide wheeled walker with Minimal assist x 2 for safety. V/C for sequencing, upright posture, and safety. Steps: NA  Treatment: Pt transferred to EOB for 18 minutes and 2 STS trfs.  Pt took 2 steps towards [facility] on her first STS trf and stood for about 1 minute. On her second STS trf, she stood for about 20 seconds d/t fatigue and c/o her L knee was hurting. Comment: Call light left by patient. Pt RTB at end of treatment with her  present. A: Pt able to stand and take 2 steps towards [facility] with assist x 2 for safety. Pt is at risk of falls with decreased strength and endurance limiting function and her c/o L knee giving out on her at times. P: Continue with physical therapy   BEN HARDEN, PTA       Goals to be met in 3 days. Bed mobility- Moderate assistance   Transfers- Moderate assistance   Ambulation- Maximal assistance for 5 feet using wheeled walker     Increase strength in affected muscle groups by 1/3 grade  Increase balance to allow for improvement towards functional goals.   Increase endurance to allow for improvement towards functional goals.

## 2019-04-18 NOTE — PROGRESS NOTES
statin  6. GERD, history of diverticulosis, supermorbid obesity, recurrent nausea    Discussed with the patient and spouse, await infectious disease input as to length of antibiotic therapy and form of administration, continue physical therapy intervention in attempt to improve physical mobility and safety. So far adequate performance status is not evident to me. SNF rehab stay has been permitted. Patient and her  would prefer that she return home on discharge, but until she demonstrates improved mobility skills as inpatient, I have continued to express my lack of confidence that she will be able to function satisfactorily in a home setting at this point.   Electronically by Cuong Salomn DO  on 4/18/19 at 8:23 AM

## 2019-04-18 NOTE — PLAN OF CARE
Problem: Inadequate energy intake (NI-1.4)  Goal: Food and/or Nutrient Delivery  Encouraged small frequent meals since pt refusing supplementation  Description  Individualized approach for food/nutrient provision.   Outcome: Met This Shift

## 2019-04-18 NOTE — PROGRESS NOTES
[facility]  PROGRESS NOTE  Admit Date:  2019   NAME:  Marguerite De La Cruz  MRN:  [id number]  :  1940  Age:   66 y.o. PCP:   Prema Craft DO, [place], [address] Day: Hospital Day: 7    Subjective:   Pt was seen and examined in a face to face fr le encounter for with CC of none  Chief Complaint   Patient presents with    Fatigue     Patient told to come to the ER by PCP because patient was to weak to get out of her car while at her PCP for her appointment today. HPI: no f/c/n/v/d   present HE HAD no questions  No f/c/N/V/D/RASH/ITCH     ROS:  CONSTITUTIONAL:   As in hpi      Scheduled Meds:   pantoprazole  40 mg Oral QAM AC    heparin flush  300 Units Intracatheter BID    mupirocin   Topical BID    simethicone  80 mg Oral 4x Daily AC & HS    cloNIDine  1 patch Transdermal Weekly    hydrocortisone   Topical BID    cefTRIAXone (ROCEPHIN) IV  1 g Intravenous Q24H    amLODIPine  10 mg Oral Daily    metoprolol succinate  50 mg Oral Daily    levothyroxine  88 mcg Oral Daily    atorvastatin  10 mg Oral Nightly    clopidogrel  75 mg Oral Daily    oxybutynin  5 mg Oral BID    nitroGLYCERIN  1 patch Transdermal Daily    PARoxetine  10 mg Oral Daily    nitroGLYCERIN   Transdermal Nightly    brinzolamide-brimonidine  1 drop Ophthalmic BID    bimatoprost  1 drop Left Eye Nightly     Continuous Infusions:  PRN Meds:ondansetron, polyethyl glycol-propyl glycol 0.4-0.3 %    ALLERGIES: Pcn [penicillins] and Noroxin [norfloxacin]    Objective:   Vitals reviewed. Vitals:    19 1701 19 2027 19 2130 19 0800   BP:  133/63 (!) 108/54 130/60   Pulse:  60 64 64   Resp:  17  18   Temp:  98.8 °F (37.1 °C) 98.5 °F (36.9 °C) 98.1 °F (36.7 °C)   TempSrc:  Oral Oral Oral   SpO2: 95% 91% 93% 95%   Weight:       Height:         Physical Exam  Constitutional: The patient is awake, alert, inc bmi in bed  Skin: Warm and dry.  No rashes were noted.   HEENT: Eyes show round, and reactive pupils. Neck: Supple to movements. Chest: No use of accessory muscles to breathe. Symmetrical expansion. Cardiovascular: S1 and S2 are rhythmic and regular. Abdomen: Positive bowel sounds to auscultation. Benign to palpation. OBESE  Extremities: No clubbing, no cyanosis, edema. rle dressed ULCER WITH YELLOW BASE NO ERYTHEMA  CNS: awake and alert  Psych:  pleasant  Lines: picc4/12    I & O - 24hr:    Intake/Output Summary (Last 24 hours) at 4/18/2019 1325  Last data filed at 4/18/2019 1108  Gross per 24 hour   Intake 480 ml   Output 300 ml   Net 180 ml     I/O this shift:  In: 120 [P.O.:120]  Out: -    Weight change:   LABS:    No results for input(s): WBC, HGB, HCT, MCV, PLT in the last 72 hours. No results for input(s): NA, K, CL, CO2, BUN, CREATININE, GFRAA, AGRATIO, LABGLOM, GLUCOSE, PROT, LABALBU, CALCIUM, BILITOT, ALKPHOS, AST, ALT in the last 72 hours. Lab Results   Component Value Date    SEDRATE 41 (H) 05/21/2012    SEDRATE 55 (H) 05/15/2012    SEDRATE 121 (H) 05/05/2012     Lab Results   Component Value Date    CRP <0.4 05/21/2012    CRP <0.4 05/15/2012        MICROBIOLOGY:         Organism   Date Value Ref Range Status   04/12/2019 Staphylococcus epidermidis (A)  Preliminary     URINECULTURE  Urine Culture, Routine   Date Value Ref Range Status   04/13/2019 <10,000 CFU/mL  Mixed gram positive organisms    Final     Organism   Date Value Ref Range Status   04/12/2019 Staphylococcus epidermidis (A)  Preliminary   04/12/2019 Staphylococcus hominis ssp hominis (A)  Preliminary              RADIOLOGY:  IR FLUORO GUIDED CVA DEVICE PLMT/REPLACE/REMOVAL   Final Result   Successful placement of a 5 Tamazight double-lumen Power   PICC line using ultrasound guidance via the right basilic  vein. IR ULTRASOUND GUIDANCE VASCULAR ACCESS   Final Result   Ultrasound guidance was provided during placement of a   PICC line.       CT CHEST W WO CONTRAST

## 2019-04-18 NOTE — PROGRESS NOTES
Physical Therapy  Physical Therapy  Daily Treatment Note  4/18/2019  [id number]                      Arianna Brothers   [id number]                              1940    Patient Active Problem List   Diagnosis    [facility]) [facility] [facility]    Pyelonephritis    Obstructive uropathy    HTN (hypertension)    Hyperlipidemia    Obstructive sleep apnea    Glaucoma    DJD (degenerative joint disease)    Morbid obesity ([facility]    Kidney stone    Pneumonia    Moderate protein-calorie malnutrition ([facility]       Recommendation for discharge: Subacute  Equipment prescriptions needed: none    AM-[facility] Mobility Inpatient   How much difficulty turning over in bed?: A Lot  How much difficulty sitting down on / standing up from a chair with arms?: A Lot  How much difficulty moving from lying on back to sitting on side of bed?: A Lot  How much help from another person moving to and from a bed to a chair?: A Lot  How much help from another person needed to walk in hospital room?: Total  How much help from another person for climbing 3-5 steps with a railing?: Total  AM-PAC Inpatient Mobility Raw Score : 10  AM-PAC Inpatient T-Scale Score : 32.29  Mobility Inpatient CMS 0-100% Score: 76.75  Mobility Inpatient CMS G-Code Modifier : CL      Precautions: falls, alarm    S: Patient cleared by nursing for treatment. Patient is agreeable to treatment. Pt c/o pain in L knee. Pain status: (measured on a visual analog scale with 0=no pain and 10=excruciating pain) 0/10. O: Pt was instructed in and performed the following:   Bed Mobility- Supine to sit- NA     Scooting- NA   Sit to supine- NA   Transfers-sit to stand- NA   Gait: NA   Steps: NA  Treatment: Pt rolled x 3 reps for linen change and removal of bedpan. Pt required mod/max assist for rolling. Pt performed the following supine exercises with AAROM/AROM: ankle pumps, gluteal sets, quad sets, heel slides, hip abduction, and SLR x 10 reps.  V/C were needed for

## 2019-04-18 NOTE — PROGRESS NOTES
Nutrition Assessment    Type and Reason for Visit: Reassess    Nutrition Recommendations: Continue current diet, Discontinue ONS(pt requesting d/c and not to start any new supplement)    Nutrition Assessment: Pt w/ poor appetite and po. Consuming 50% meal trays. Pt not agreeable to any of the nutritional supplements. Wants Ensure HP d/c'd. Malnutrition Assessment:  · Malnutrition Status: Meets the criteria for moderate malnutrition  · Context: Chronic illness  · Findings of the 6 clinical characteristics of malnutrition (Minimum of 2 out of 6 clinical characteristics is required to make the diagnosis of moderate or severe Protein Calorie Malnutrition based on AND/ASPEN Guidelines):  1. Energy Intake-Less than or equal to 75% of estimated energy requirement, Greater than or equal to 5 days    2. Weight Loss-Unable to assess(2/2 fluid), unable to assess  3. Fat Loss-Mild subcutaneous fat loss, Orbital  4. Muscle Loss-Mild muscle mass loss, Temples (temporalis muscle)  5. Fluid Accumulation-No significant fluid accumulation   6.  Strength-Not measured    Nutrition Risk Level: Moderate    Nutrient Needs:  · Estimated Daily Total Kcal: 0274-4221(32-23)  · Estimated Daily Protein (g): 110-135(2-2.5)  · Estimated Daily Total Fluid (ml/day): 3611-8719(1ml/kcal)    Nutrition Diagnosis:   · Problem:  Moderate malnutrition, In context of chronic illness  · Etiology: related to Insufficient energy/nutrient consumption     Signs and symptoms:  as evidenced by Mild loss of subcutaneous fat, Mild muscle loss, Diet history of poor intake, Intake 50-75%    Objective Information:  · Nutrition-Focused Physical Findings: abd-soft, rotund, loss of appetite, +BS, +1 general, skin-bruising BUE/BLE, red abd fold/groin/buttocks, tear inner thigh, +wound, +I/O  · Wound Type: Skin Tears(wound R leg)  · Current Nutrition Therapies:  · Oral Diet Orders: General   · Oral Diet intake: 51-75%  · Oral Nutrition Supplement (ONS) Orders: Low Calorie High Protein Supplement  · ONS intake: Refused  · Anthropometric Measures:  · Ht: 5' 4\" (162.6 cm)   · Current Body Wt: 345 lb (156.5 kg)(4/12-UTO new wt d/t BS error)  · Admission Body Wt: 365 lb (165.6 kg)(4/12/19, bedscale ; possible wt discrepancy d/t compression machines on bed)  · Usual Body Wt: 400 lb (181.4 kg)(per pt)  · % Weight Change: FAMILIA d/t limited wt data and fluid  · Ideal Body Wt: 120 lb (54.4 kg), % Ideal Body 288%  · BMI Classification: BMI > or equal to 40.0 Obese Class III    Nutrition Interventions:   Continued Inpatient Monitoring, Coordination of Care    Nutrition Evaluation:   · Evaluation: Goals set   · Goals: >75% meal tray    · Monitoring: Meal Intake, Diet Tolerance, Skin Integrity, Wound Healing, I&O, Pertinent Labs, Weight, Monitor Bowel Function      Electronically signed by Maureen Howe, MS, RD, LD on 4/18/19 at 12:27 PM    Contact Number: [phone]

## 2019-04-18 NOTE — PLAN OF CARE
Problem: Falls - Risk of:  Goal: Will remain free from falls  Description  Will remain free from falls  4/18/2019 1258 by Jessica Leroy RN  Outcome: Met This Shift     Problem: Falls - Risk of:  Goal: Absence of physical injury  Description  Absence of physical injury  4/18/2019 1258 by Jessica Leroy RN  Outcome: Met This Shift     Problem: Risk for Impaired Skin Integrity  Goal: Tissue integrity - skin and mucous membranes  Description  Structural intactness and normal physiological function of skin and  mucous membranes.   4/18/2019 1258 by Jessica Leroy RN  Outcome: Met This Shift     Problem: Nutrition  Goal: Optimal nutrition therapy  Outcome: Met This Shift

## 2019-04-18 NOTE — PROGRESS NOTES
P Quality Flow/Interdisciplinary Rounds Progress Note        Quality Flow Rounds held on April 18, 2019    Disciplines Attending:  Bedside Nurse, ,  and Nursing Unit Leadership    Saba Callahan was admitted on 4/12/2019  3:14 PM    Anticipated Discharge Date:  Expected Discharge Date: 04/16/19    Disposition:    Francisco J Score:  Francisco J Scale Score: 17    Readmission Risk              Risk of Unplanned Readmission:        15           Discussed patient goal for the day, patient clinical progression, and barriers to discharge.   The following Goal(s) of the Day/Commitment(s) have been identified:  IV ATX, PT/OT, RA, awaiting final ATX for DC      Leonor See  April 18, 2019

## 2019-04-19 ENCOUNTER — APPOINTMENT (OUTPATIENT)
Dept: GENERAL RADIOLOGY | Age: 79
DRG: 871 | End: 2019-04-19
Payer: MEDICARE

## 2019-04-19 LAB
BLOOD CULTURE, ROUTINE: ABNORMAL
BLOOD CULTURE, ROUTINE: ABNORMAL
CULTURE, BLOOD 2: ABNORMAL
CULTURE, BLOOD 2: ABNORMAL
ORGANISM: ABNORMAL
ORGANISM: ABNORMAL

## 2019-04-19 PROCEDURE — 2580000003 HC RX 258: Performed by: FAMILY MEDICINE

## 2019-04-19 PROCEDURE — 6360000002 HC RX W HCPCS: Performed by: FAMILY MEDICINE

## 2019-04-19 PROCEDURE — 73562 X-RAY EXAM OF KNEE 3: CPT

## 2019-04-19 PROCEDURE — 6360000002 HC RX W HCPCS: Performed by: SPECIALIST

## 2019-04-19 PROCEDURE — 97530 THERAPEUTIC ACTIVITIES: CPT

## 2019-04-19 PROCEDURE — 6370000000 HC RX 637 (ALT 250 FOR IP): Performed by: FAMILY MEDICINE

## 2019-04-19 PROCEDURE — 94660 CPAP INITIATION&MGMT: CPT

## 2019-04-19 PROCEDURE — 97110 THERAPEUTIC EXERCISES: CPT

## 2019-04-19 PROCEDURE — 97116 GAIT TRAINING THERAPY: CPT

## 2019-04-19 PROCEDURE — 1200000000 HC SEMI PRIVATE

## 2019-04-19 PROCEDURE — 2500000003 HC RX 250 WO HCPCS: Performed by: STUDENT IN AN ORGANIZED HEALTH CARE EDUCATION/TRAINING PROGRAM

## 2019-04-19 PROCEDURE — 36592 COLLECT BLOOD FROM PICC: CPT

## 2019-04-19 PROCEDURE — 2580000003 HC RX 258: Performed by: SPECIALIST

## 2019-04-19 PROCEDURE — 94150 VITAL CAPACITY TEST: CPT

## 2019-04-19 RX ORDER — BUPIVACAINE HYDROCHLORIDE 2.5 MG/ML
10 INJECTION, SOLUTION EPIDURAL; INFILTRATION; INTRACAUDAL ONCE
Status: COMPLETED | OUTPATIENT
Start: 2019-04-19 | End: 2019-04-19

## 2019-04-19 RX ORDER — LIDOCAINE HYDROCHLORIDE 10 MG/ML
10 INJECTION, SOLUTION EPIDURAL; INFILTRATION; INTRACAUDAL; PERINEURAL SEE ADMIN INSTRUCTIONS
Status: DISCONTINUED | OUTPATIENT
Start: 2019-04-19 | End: 2019-04-23 | Stop reason: HOSPADM

## 2019-04-19 RX ORDER — TRIAMCINOLONE ACETONIDE 40 MG/ML
40 INJECTION, SUSPENSION INTRA-ARTICULAR; INTRAMUSCULAR ONCE
Status: DISCONTINUED | OUTPATIENT
Start: 2019-04-19 | End: 2019-04-23 | Stop reason: HOSPADM

## 2019-04-19 RX ADMIN — SIMETHICONE CHEW TAB 80 MG 80 MG: 80 TABLET ORAL at 11:43

## 2019-04-19 RX ADMIN — CLOPIDOGREL BISULFATE 75 MG: 75 TABLET ORAL at 09:22

## 2019-04-19 RX ADMIN — BUPIVACAINE HYDROCHLORIDE 25 MG: 2.5 INJECTION, SOLUTION EPIDURAL; INFILTRATION; INTRACAUDAL; PERINEURAL at 12:48

## 2019-04-19 RX ADMIN — VANCOMYCIN HYDROCHLORIDE 1250 MG: 10 INJECTION, POWDER, LYOPHILIZED, FOR SOLUTION INTRAVENOUS at 20:17

## 2019-04-19 RX ADMIN — HYDROCORTISONE: 25 CREAM TOPICAL at 09:23

## 2019-04-19 RX ADMIN — OXYBUTYNIN CHLORIDE 5 MG: 5 TABLET ORAL at 20:18

## 2019-04-19 RX ADMIN — PANTOPRAZOLE SODIUM 40 MG: 40 TABLET, DELAYED RELEASE ORAL at 05:19

## 2019-04-19 RX ADMIN — ATORVASTATIN CALCIUM 10 MG: 10 TABLET, FILM COATED ORAL at 20:18

## 2019-04-19 RX ADMIN — Medication 300 UNITS: at 20:17

## 2019-04-19 RX ADMIN — HYDROCORTISONE: 25 CREAM TOPICAL at 20:18

## 2019-04-19 RX ADMIN — MUPIROCIN: 20 OINTMENT TOPICAL at 09:23

## 2019-04-19 RX ADMIN — OXYBUTYNIN CHLORIDE 5 MG: 5 TABLET ORAL at 09:22

## 2019-04-19 RX ADMIN — METOPROLOL SUCCINATE 50 MG: 25 TABLET, EXTENDED RELEASE ORAL at 09:22

## 2019-04-19 RX ADMIN — SIMETHICONE CHEW TAB 80 MG 80 MG: 80 TABLET ORAL at 05:19

## 2019-04-19 RX ADMIN — PAROXETINE HYDROCHLORIDE 10 MG: 10 TABLET, FILM COATED ORAL at 09:22

## 2019-04-19 RX ADMIN — MUPIROCIN: 20 OINTMENT TOPICAL at 20:18

## 2019-04-19 RX ADMIN — AMLODIPINE BESYLATE 10 MG: 5 TABLET ORAL at 09:22

## 2019-04-19 RX ADMIN — SIMETHICONE CHEW TAB 80 MG 80 MG: 80 TABLET ORAL at 17:16

## 2019-04-19 RX ADMIN — WATER 1 G: 1 INJECTION INTRAMUSCULAR; INTRAVENOUS; SUBCUTANEOUS at 09:22

## 2019-04-19 RX ADMIN — Medication 300 UNITS: at 09:22

## 2019-04-19 RX ADMIN — LEVOTHYROXINE SODIUM 88 MCG: 88 TABLET ORAL at 05:19

## 2019-04-19 RX ADMIN — SIMETHICONE CHEW TAB 80 MG 80 MG: 80 TABLET ORAL at 20:18

## 2019-04-19 ASSESSMENT — PAIN SCALES - GENERAL
PAINLEVEL_OUTOF10: 0
PAINLEVEL_OUTOF10: 4
PAINLEVEL_OUTOF10: 0

## 2019-04-19 NOTE — PROGRESS NOTES
Occupational Therapy  O.T. Bedside Treatment Note    Date:2019  Patient Name: Randall Corona  MRN: [id number]  : 1940  ROOM #: [id number]    Problem list / Diagnosis:   Patient Active Problem List   Diagnosis    [facility]) [facility] [address] [facility]    Pyelonephritis    Obstructive uropathy    HTN (hypertension)    Hyperlipidemia    Obstructive sleep apnea    Glaucoma    DJD (degenerative joint disease)    Morbid obesity ([facility] [facility]    Kidney stone    Pneumonia    Moderate protein-calorie malnutrition ([address]     Past Medical History:   Past Medical History:   Diagnosis Date    Cataract     right eye    Cholecystitis with cholelithiasis     Diverticulitis     Diverticulosis     DJD (degenerative joint disease)     DVT (deep venous thrombosis) (HCC)     RLE    Fibroids     Glaucoma     HTN (hypertension)     Hyperlipidemia     states on med for precaution    Morbid obesity ([facility] [address]     GLENNA on CPAP     Osteoarthritis     PE (pulmonary embolism)     Renal cyst     right     Onset/Medical history: medical history reviewed  Past medical history: reviewed    The admitting diagnosis and active problem list as listed above have been reviewed prior to treatment. Nursing cleared the patient for treatment. Patient is agreeable to treatment.     Discharge recommendations: DINA   Equipment prescriptions needed: to be determined at rehab    [facility] Daily Activity Inpatient     -PAC Daily Activity Inpatient   How much help for putting on and taking off regular lower body clothing?:Total  How much help for Bathing?: A Lot  How much help for Toileting?: Total  How much help for putting on and taking off regular upper body clothing?: A Lot  How much help for taking care of personal grooming?: A Lot  How much help for eating meals?: A Little  AM-[facility] Inpatient Daily Activity Raw Score: 11  AM-PAC Inpatient ADL T-Scale Score : 29.04  ADL Inpatient CMS 0-100% Score: 70.42  ADL Inpatient CMS G-Code Modifier : fair/fair - (2* to decreased endurance, strength, fatigue)  -Edema: yes - BLE's and R UE; nsg aware; positioning provided  -Safety: fair (VC's for follow through)  - Pt/family education/training: bed mobility, transfer training, functional mobility, UE/LE dressing, BUE ROM ex's, ECT's  -Pt would benefit from additional ADLs, safety education, balance activities, transfer training, strength exercises, and over all endurance building.     P:  - Plan of care: Patient will be seen by OT 1-3x/wk  for therapeutic activity, ADL re-training, bed mobility,functional transfers, functional mobility, safety and fall prevention, balance and endurance activities, instruction and training in energy conservation principles, and   patient and family education.   - Patient and/or family understands diagnosis, prognosis and plan of care: yes   - ADL retraining, bathroom safety, DME    Treatment minutes: [address] [facility], [address] [place] [address]

## 2019-04-19 NOTE — PROGRESS NOTES
Progress Note    History:  Stood at bedside with assistance twice yesterday, 1 or 2 shuffling steps but patient states that her mobility was severely affected by pain in her left knee. Long-standing history of pain in that joint, with prior outpatient orthopedic care and injection. Denies actual buckling or giving way. She does not perceive the right knee gave way or buckled, and did not feel right leg was weak as previously. Left knee pain has continued despite nonweightbearing. No nausea yesterday and this morning, no emesis, no sensation of heartburn or indigestion. Tolerated supper poorly secondary to dryness of food product. Fluid intake is suspected to remain suboptimal. Passing flatus without bowel movement, no abdominal pain or cramps. Denies urinary complaints. Patient's medical and surgical history, medications and allergies were reviewed. Interval notes of consultants, nurses, therapists, and ancillary services were also reviewed, as well as the medical administration record. Physical Exam:  Vitals:    04/17/19 2027 04/17/19 2130 04/18/19 0800 04/18/19 1915   BP: 133/63 (!) 108/54 130/60 137/69   Pulse: 60 64 64 65   Resp: 17 20 18 18   Temp: 98.8 °F (37.1 °C) 98.5 °F (36.9 °C) 98.1 °F (36.7 °C) 98.1 °F (36.7 °C)   TempSrc: Oral Oral Oral Oral   SpO2: 91% 93% 95% 93%   Weight:       Height:           No icterus or cyanosis, color good. Resolving ecchymoses bilateral dorsal surface of her hands and left upper and lower arm. Lung auscultation clear breath sounds, no rales or wheezes, respirations nonlabored 8/m. Heart rhythm is regular, no S3 or S4 is appreciated, rate is presently 60/m. Had no soft and obese with active bowel sounds all quadrants, no tenderness is admitted with palpation. Upper anterior chest collar distribution rash is nearly resolved. Resolving bruise lateral joint space left knee, mild tenderness with manipulation, no crepitus.  Distal extremity normal. Right calf somewhat softer, continued superficial skin discoloration, right upper posterior lateral calf wound continues with satisfactory granulation, no active drainage at present. Assessment/Plan:   1. Bacteremia due to staph epidermidis and staph hominis, resolving cellulitis right lower extremity, open wound right calf sustained March 15  2. Contusion right leg, mobility impairment, osteoarthritis left knee  3. Dehydration, pulmonary atelectasis  4. Essential hypertension, PACs, mitral annular calcification  5. GERD, history of diverticulosis, supermorbid obesity  6. Resolved hypokalemia, hypothyroidism on supplementation, hypercholesterolemia on statin  Discussed with the patient and her , remains on dual antibiotic therapy for bacteremic cultures, culture of 4/16 preliminary positive coagulase-negative staph. Seek orthopedic consultation regarding left knee discomfort, NSAID considered but attempt to hold off on same secondary to her GI issues. Follow for further therapy tolerance and ID input.   Electronically by Larry Tate DO  on 4/19/19 at 8:25 AM

## 2019-04-19 NOTE — PROGRESS NOTES
P Quality Flow/Interdisciplinary Rounds Progress Note        Quality Flow Rounds held on April 19, 2019    Disciplines Attending:  Bedside Nurse, ,  and Nursing Unit Leadership    Emely Coyne was admitted on 4/12/2019  3:14 PM    Anticipated Discharge Date:  Expected Discharge Date: 04/16/19    Disposition:    Francisco J Score:  Francisco J Scale Score: 17    Readmission Risk              Risk of Unplanned Readmission:        16           Discussed patient goal for the day, patient clinical progression, and barriers to discharge.   The following Goal(s) of the Day/Commitment(s) have been identified:  PICC, activity progression, prompt for DC, Iv ATX      Sebas Stanley  April 19, 2019

## 2019-04-19 NOTE — CONSULTS
Department of Orthopedic Surgery  Resident Consult Note          CHIEF COMPLAINT:  Left knee pain    HISTORY OF PRESENT ILLNESS:                The patient is a 66 y.o. female who presents with left knee pain. Patient states this is a chronic problem however it has recently become worse. Has any recent falls or traumas. Patient states that she has had injections in the past however there are many years ago and he did seem to help. Denies any numbness or paresthesias.  Denies any other orthopedic complaints at this time    Past Medical History:        Diagnosis Date    Cataract     right eye    Cholecystitis with cholelithiasis     Diverticulitis     Diverticulosis     DJD (degenerative joint disease)     DVT (deep venous thrombosis) (HCC)     RLE    Fibroids     Glaucoma     HTN (hypertension)     Hyperlipidemia     states on med for precaution    Morbid obesity ([address]     GLENNA on CPAP     Osteoarthritis     PE (pulmonary embolism)     Renal cyst     right     Past Surgical History:        Procedure Laterality Date    CATARACT REMOVAL WITH IMPLANT  12/2011    left eye    CHOLECYSTECTOMY, LAPAROSCOPIC  1995    COLONOSCOPY      CYSTOSCOPY  jun 2012    cystoscopy, right ureteroscopy with retrograde pyelograms, stone manipulation, stent insertion, laser lithotripsy with holmium laser    HYSTERECTOMY      WI GLAUCOMA SURG,TRABECU AB EXTERNO Right 11/15/2018    RIGHT EYE REVISION OF XEN EYE STENT, TRABECULECTOMY performed by Jonny Camp MD at [address] [place] [address]    for RLE DVT     Current Medications:   Current Facility-Administered Medications: magnesium hydroxide (MILK OF MAGNESIA) 400 MG/5ML suspension 30 mL, 30 mL, Oral, Daily PRN  bupivacaine (PF) (MARCAINE) 0.25 % injection 25 mg, 10 mL, Intradermal, Once  triamcinolone acetonide (KENALOG-40) injection 40 mg, 40 mg, Intramuscular, Once  lidocaine PF 1 % optimization  · In a sterile manner 4:4:1 ml injection was given with lidocaine, Marcaine and Kenalog in the left knee.  Patient tolerated procedure well  ·  okay to follow-up in office with Dr. Zenaida Thorne we'll discuss plan with Dr. Zenaida Thorne

## 2019-04-19 NOTE — PROGRESS NOTES
Physical Therapy  Physical Therapy  Daily Treatment Note  4/19/2019  [id number]                      Cristóbal Guerrero   [id number]                              1940    Patient Active Problem List   Diagnosis    [facility] ([facility]) ([facility] [address] [facility]    Pyelonephritis    Obstructive uropathy    HTN (hypertension)    Hyperlipidemia    Obstructive sleep apnea    Glaucoma    DJD (degenerative joint disease)    Morbid obesity ([facility] [facility]    Kidney stone    Pneumonia    Moderate protein-calorie malnutrition ([facility]       Recommendation for discharge: Subacute  Equipment prescriptions needed: none    AM-[facility] Mobility Inpatient   How much difficulty turning over in bed?: A Lot  How much difficulty sitting down on / standing up from a chair with arms?: A Lot  How much difficulty moving from lying on back to sitting on side of bed?: A Lot  How much help from another person moving to and from a bed to a chair?: A Lot  How much help from another person needed to walk in hospital room?: Total  How much help from another person for climbing 3-5 steps with a railing?: Total  AM-PAC Inpatient Mobility Raw Score : 10  AM-PAC Inpatient T-Scale Score : 32.29  Mobility Inpatient CMS 0-100% Score: 76.75  Mobility Inpatient CMS G-Code Modifier : CL      Precautions: falls, alarm    S: Patient cleared by nursing for treatment. Patient is agreeable to treatment. Pt c/o pain in L knee. Pt had injection in L knee earlier today. Pain status: (measured on a visual analog scale with 0=no pain and 10=excruciating pain) 0/10. O: Pt was instructed in and performed the following:   Bed Mobility- Supine to sit- Maximal assistance of 2      Scooting- Moderate assistance of 2    Sit to supine- Maximal assistance of 2    Transfers-sit to stand- Maximal assistance of 2    Gait: Pt ambulated 3 feet towards HOB using wide wheeled walker with Minimal/Moderate assistance of 2.  Comment: V/C were needed for safety, upright posture, and walker management. Steps: NA  Treatment: Pt was educated and facilitated on techniques to increase safety and independence with bed mobility, balance, functional transfers, and functional mobility. Pt transferred to side of bed and sat up x 15 minutes to increase dynamic sitting balance and activity tolerance. Pt stood x 2 reps and took steps towards HOB. Pt performed seated BLE ankle pumps, LAQ, and hip abd/add x 10-15 reps. V/C were needed for correct technique. Comment: Call light left by patient. Pt RTB at end of treatment, alarm was activated. A: Pt had good tolerance to above treatment but c/o pain con't to limit activity. However, patient was motivated to participate as able. P: Continue with physical therapy   Antonella Hernandez, PTA       Goals to be met in 3 days. Bed mobility- Moderate assistance   Transfers- Moderate assistance   Ambulation- Maximal assistance for 5 feet using wheeled walker     Increase strength in affected muscle groups by 1/3 grade  Increase balance to allow for improvement towards functional goals.   Increase endurance to allow for improvement towards functional goals.

## 2019-04-19 NOTE — PLAN OF CARE
Problem: Falls - Risk of:  Goal: Will remain free from falls  Description  Will remain free from falls  Outcome: Met This Shift  Goal: Absence of physical injury  Description  Absence of physical injury  Outcome: Met This Shift     Problem: Risk for Impaired Skin Integrity  Goal: Tissue integrity - skin and mucous membranes  Description  Structural intactness and normal physiological function of skin and  mucous membranes.   Outcome: Met This Shift     Problem: Nutrition  Goal: Optimal nutrition therapy  Outcome: Met This Shift

## 2019-04-19 NOTE — PROGRESS NOTES
[facility]  PROGRESS NOTE  Admit Date:  2019   NAME:  Ori Burnett  MRN:  [id number]  :  1940  Age:   66 y.o. PCP:   Caryle Beams, DO, Caryle Beams, [address] Day: Hospital Day: 8    Subjective:   Pt was seen and examined in a face to face fr le encounter for with CC of none  Chief Complaint   Patient presents with    Fatigue     Patient told to come to the ER by PCP because patient was to weak to get out of her car while at her PCP for her appointment today. HPI: no f/c/n/v/d   present- no questions   No f/c/N/V/D/RASH/ITCH   Patient ok. ROS:  CONSTITUTIONAL:   As in hpi    Scheduled Meds:   bupivacaine (PF)  10 mL Intradermal Once    triamcinolone acetonide  40 mg Intramuscular Once    lidocaine PF  10 mL Intra-articular See Admin Instructions    vancomycin  1,250 mg Intravenous Q24H    pantoprazole  40 mg Oral QAM AC    heparin flush  300 Units Intracatheter BID    mupirocin   Topical BID    simethicone  80 mg Oral 4x Daily AC & HS    cloNIDine  1 patch Transdermal Weekly    hydrocortisone   Topical BID    cefTRIAXone (ROCEPHIN) IV  1 g Intravenous Q24H    amLODIPine  10 mg Oral Daily    metoprolol succinate  50 mg Oral Daily    levothyroxine  88 mcg Oral Daily    atorvastatin  10 mg Oral Nightly    clopidogrel  75 mg Oral Daily    oxybutynin  5 mg Oral BID    nitroGLYCERIN  1 patch Transdermal Daily    PARoxetine  10 mg Oral Daily    nitroGLYCERIN   Transdermal Nightly    brinzolamide-brimonidine  1 drop Ophthalmic BID    bimatoprost  1 drop Left Eye Nightly     Continuous Infusions:  PRN Meds:magnesium hydroxide, ondansetron, polyethyl glycol-propyl glycol 0.4-0.3 %    ALLERGIES: Pcn [penicillins] and Noroxin [norfloxacin]    Objective:   Vitals reviewed.   Vitals:    19 2130 19 0800 19 1915 19 0800   BP: (!) 108/54 130/60 137/69 134/68   Pulse: 64 64 65 68   Resp:  18 18 16   Temp: 98.5 °F this visit day.   I agree with the assessment, plan and orders as documented by the NP on the date of service and I agree with all of the pertinent clinical information unless otherwise noted  Getting a bath  present  no concerns  No rash   F/u labs  Cons bacteremia 3 sets  Cont vanco  Check trough   Stop  ceftriaxone  Electronically signed by Serenity Bustamante MD on 4/19/2019 at 11:31 AM    Phone [phone]  Fax [phone]

## 2019-04-20 PROCEDURE — 6370000000 HC RX 637 (ALT 250 FOR IP): Performed by: FAMILY MEDICINE

## 2019-04-20 PROCEDURE — 6360000002 HC RX W HCPCS: Performed by: SPECIALIST

## 2019-04-20 PROCEDURE — 6360000002 HC RX W HCPCS: Performed by: FAMILY MEDICINE

## 2019-04-20 PROCEDURE — 1200000000 HC SEMI PRIVATE

## 2019-04-20 PROCEDURE — 2580000003 HC RX 258: Performed by: FAMILY MEDICINE

## 2019-04-20 PROCEDURE — 94660 CPAP INITIATION&MGMT: CPT

## 2019-04-20 PROCEDURE — 2580000003 HC RX 258: Performed by: SPECIALIST

## 2019-04-20 RX ADMIN — CLOPIDOGREL BISULFATE 75 MG: 75 TABLET ORAL at 09:33

## 2019-04-20 RX ADMIN — Medication 300 UNITS: at 09:47

## 2019-04-20 RX ADMIN — OXYBUTYNIN CHLORIDE 5 MG: 5 TABLET ORAL at 09:32

## 2019-04-20 RX ADMIN — LEVOTHYROXINE SODIUM 88 MCG: 88 TABLET ORAL at 05:11

## 2019-04-20 RX ADMIN — AMLODIPINE BESYLATE 10 MG: 5 TABLET ORAL at 09:32

## 2019-04-20 RX ADMIN — PANTOPRAZOLE SODIUM 40 MG: 40 TABLET, DELAYED RELEASE ORAL at 05:11

## 2019-04-20 RX ADMIN — PAROXETINE HYDROCHLORIDE 10 MG: 10 TABLET, FILM COATED ORAL at 09:32

## 2019-04-20 RX ADMIN — VANCOMYCIN HYDROCHLORIDE 1250 MG: 10 INJECTION, POWDER, LYOPHILIZED, FOR SOLUTION INTRAVENOUS at 20:34

## 2019-04-20 RX ADMIN — HYDROCORTISONE: 25 CREAM TOPICAL at 20:35

## 2019-04-20 RX ADMIN — SIMETHICONE CHEW TAB 80 MG 80 MG: 80 TABLET ORAL at 11:28

## 2019-04-20 RX ADMIN — MUPIROCIN: 20 OINTMENT TOPICAL at 09:34

## 2019-04-20 RX ADMIN — SIMETHICONE CHEW TAB 80 MG 80 MG: 80 TABLET ORAL at 20:36

## 2019-04-20 RX ADMIN — ATORVASTATIN CALCIUM 10 MG: 10 TABLET, FILM COATED ORAL at 20:36

## 2019-04-20 RX ADMIN — SIMETHICONE CHEW TAB 80 MG 80 MG: 80 TABLET ORAL at 17:04

## 2019-04-20 RX ADMIN — WATER 1 G: 1 INJECTION INTRAMUSCULAR; INTRAVENOUS; SUBCUTANEOUS at 09:11

## 2019-04-20 RX ADMIN — SIMETHICONE CHEW TAB 80 MG 80 MG: 80 TABLET ORAL at 05:11

## 2019-04-20 RX ADMIN — Medication 100 UNITS: at 20:36

## 2019-04-20 RX ADMIN — METOPROLOL SUCCINATE 50 MG: 25 TABLET, EXTENDED RELEASE ORAL at 09:32

## 2019-04-20 RX ADMIN — MUPIROCIN: 20 OINTMENT TOPICAL at 20:45

## 2019-04-20 RX ADMIN — OXYBUTYNIN CHLORIDE 5 MG: 5 TABLET ORAL at 20:36

## 2019-04-20 RX ADMIN — HYDROCORTISONE: 25 CREAM TOPICAL at 09:34

## 2019-04-20 ASSESSMENT — PAIN SCALES - GENERAL: PAINLEVEL_OUTOF10: 0

## 2019-04-20 NOTE — PROGRESS NOTES
hypercholesterolemia  7.  Resolved hypokalemia  Discussed with the patient and her , discontinue rocephin as per ID recommendation, remains on IV vancomycin with 24 hour no-growth on 4/18 PM BC, continue atb dressing to right calf wound, continue PT/OT intervention with anticipated rehab setting after discharge before return to home   Electronically by Patti Pinon DO  on 4/20/19 at 9:40 AM

## 2019-04-20 NOTE — PLAN OF CARE
Problem: Falls - Risk of:  Goal: Will remain free from falls  Description  Will remain free from falls  4/20/2019 0353 by Praful Alcaraz RN  Outcome: Met This Shift  4/19/2019 2250 by Maribel Paz RN  Outcome: Met This Shift  Goal: Absence of physical injury  Description  Absence of physical injury  4/20/2019 0353 by Praful Alcaraz RN  Outcome: Met This Shift  4/19/2019 2250 by Maribel Paz RN  Outcome: Met This Shift     Problem: Risk for Impaired Skin Integrity  Goal: Tissue integrity - skin and mucous membranes  Description  Structural intactness and normal physiological function of skin and  mucous membranes.   Outcome: Met This Shift     Problem: Nutrition  Goal: Optimal nutrition therapy  Outcome: Met This Shift

## 2019-04-20 NOTE — PLAN OF CARE
Problem: Falls - Risk of:  Goal: Will remain free from falls  Description  Will remain free from falls  4/19/2019 2250 by Sarmad Dick RN  Outcome: Met This Shift     Problem: Falls - Risk of:  Goal: Absence of physical injury  Description  Absence of physical injury  4/19/2019 2250 by Sarmad Dick RN  Outcome: Met This Shift

## 2019-04-20 NOTE — PROGRESS NOTES
[facility]  PROGRESS NOTE  Admit Date:  2019   NAME:  Keren Ambrocio  MRN:  [id number]  :  1940  Age:   66 y.o. PCP:   Berna Corona DO, Dottie Dutton, [address] Day: Hospital Day: 9    Subjective:   Pt was seen and examined in a face to face fr le encounter for with CC of none  Chief Complaint   Patient presents with    Fatigue     Patient told to come to the ER by PCP because patient was to weak to get out of her car while at her PCP for her appointment today. HPI: no f/c/n/v/d   present- no questions   No f/c/N/V/D/RASH/ITCH   Patient ok. ROS:  CONSTITUTIONAL:   As in hpi    Scheduled Meds:   triamcinolone acetonide  40 mg Intramuscular Once    lidocaine PF  10 mL Intra-articular See Admin Instructions    vancomycin  1,250 mg Intravenous Q24H    pantoprazole  40 mg Oral QAM AC    heparin flush  300 Units Intracatheter BID    mupirocin   Topical BID    simethicone  80 mg Oral 4x Daily AC & HS    cloNIDine  1 patch Transdermal Weekly    hydrocortisone   Topical BID    amLODIPine  10 mg Oral Daily    metoprolol succinate  50 mg Oral Daily    levothyroxine  88 mcg Oral Daily    atorvastatin  10 mg Oral Nightly    clopidogrel  75 mg Oral Daily    oxybutynin  5 mg Oral BID    nitroGLYCERIN  1 patch Transdermal Daily    PARoxetine  10 mg Oral Daily    nitroGLYCERIN   Transdermal Nightly    brinzolamide-brimonidine  1 drop Ophthalmic BID    bimatoprost  1 drop Left Eye Nightly     Continuous Infusions:  PRN Meds:magnesium hydroxide, ondansetron, polyethyl glycol-propyl glycol 0.4-0.3 %    ALLERGIES: Pcn [penicillins] and Noroxin [norfloxacin]    Objective:   Vitals reviewed.   Vitals:    19 0800 19 1600 19 2315 19 0730   BP: 134/68 (!) 119/58  136/63   Pulse: 68 58  65   Resp: 16 18  18   Temp: 98.1 °F (36.7 °C) 98.1 °F (36.7 °C)  98.8 °F (37.1 °C)   TempSrc: Oral Oral  Oral   SpO2: 92% 93% 96% 95%   Weight: Height:         Physical Exam   Vitals reviewed. Constitutional: The patient is awake, alert, inc bmi in bed  Skin: Warm and dry. No rashes were noted. HEENT: Eyes show round, and reactive pupils. Neck: Supple to movements. Chest: No use of accessory muscles to breathe. Symmetrical expansion. Cardiovascular: S1 and S2 are rhythmic and regular. Abdomen: Positive bowel sounds to auscultation. Benign to palpation. OBESE  Extremities: No clubbing, no cyanosis, edema. rle dressed ULCER WITH YELLOW BASE NO ERYTHEMA seems smaller no pain  CNS: awake and alert  Psych:  pleasant  Lines: Right upper arm with picc--4/15    I & O - 24hr:    Intake/Output Summary (Last 24 hours) at 4/20/2019 1546  Last data filed at 4/20/2019 1439  Gross per 24 hour   Intake 700 ml   Output --   Net 700 ml     No intake/output data recorded. Weight change:   LABS:   Lab Results   Component Value Date    SEDRATE 41 (H) 05/21/2012    SEDRATE 55 (H) 05/15/2012    SEDRATE 121 (H) 05/05/2012     Lab Results   Component Value Date    CRP <0.4 05/21/2012    CRP <0.4 05/15/2012     MICROBIOLOGY:    Organism   Date Value Ref Range Status   04/16/2019 Coagulase Negative Staphylococci (A)  Preliminary     URINECULTURE  Urine Culture, Routine   Date Value Ref Range Status   04/13/2019 <10,000 CFU/mL  Mixed gram positive organisms    Final     Organism   Date Value Ref Range Status   04/16/2019 Coagulase Negative Staphylococci (A)  Preliminary   04/12/2019 Staphylococcus epidermidis (A)  Final   04/12/2019 Staphylococcus hominis ssp hominis (A)  Final              RADIOLOGY:  XR KNEE LEFT (3 VIEWS)   Final Result      1. Joint space narrowing, moderate bilaterally in the femoropatellar   compartment and severe in the medial compartment. 2. Probable small effusion in the infrapatellar region.             IR FLUORO GUIDED CVA DEVICE PLMT/REPLACE/REMOVAL   Final Result   Successful placement of a 5 [place] double-lumen Power   PICC line using ultrasound guidance via the right basilic  vein. IR ULTRASOUND GUIDANCE VASCULAR ACCESS   Final Result   Ultrasound guidance was provided during placement of a   PICC line. CT CHEST W WO CONTRAST   Final Result   1. Small bibasilar subsegmental atelectasis/infiltrates            US DUP LOWER EXTREMITY RIGHT MIGUELITO   Final Result   No evidence of right lower extremity deep venous thrombus   from common femoral vein to proximal calf. XR CHEST 1 VW   Final Result        Lab Results   Component Value Date     04/15/2019    K 3.9 04/15/2019     04/15/2019    CO2 22 04/15/2019    BUN 8 04/15/2019    CREATININE 0.8 04/15/2019    GFRAA >60 04/15/2019    LABGLOM >60 04/15/2019    GLUCOSE 88 04/15/2019    GLUCOSE 105 05/24/2012    PROT 5.9 04/13/2019    CALCIUM 9.2 04/15/2019    BILITOT 1.4 04/13/2019    ALKPHOS 100 04/13/2019    AST 12 04/13/2019    ALT 5 04/13/2019     Assessment/Plan:   66 y.o. female presented with   Chief Complaint   Patient presents with    Fatigue     Patient told to come to the ER by PCP because patient was to weak to get out of her car while at her PCP for her appointment today. Cons bacteremia 4/12, 4/16 source not clear has picc may need to remove cx tip     Pneumonia? Moderate protein-calorie malnutrition ([address]     F/u blood cx AGAIN   Plan:   · CONT  vanco QDAY  · Check trough  · IS  · Echo NOTED  · ceftriaxone had 8 days   · PICC 4/15  · Await final blood culture results     Imaging and labs were reviewed per medical records and any ID pertinent labs were addressed with the patient.    SEE ORDERS        Electronically signed by Vanita Pettit MD on 4/20/2019 at 3:46 PM    Phone [phone]  Fax [phone]

## 2019-04-21 LAB
BLOOD CULTURE, ROUTINE: NORMAL
VANCOMYCIN TROUGH: 9.8 MCG/ML (ref 5–16)

## 2019-04-21 PROCEDURE — 6370000000 HC RX 637 (ALT 250 FOR IP): Performed by: FAMILY MEDICINE

## 2019-04-21 PROCEDURE — 2580000003 HC RX 258: Performed by: SPECIALIST

## 2019-04-21 PROCEDURE — 80202 ASSAY OF VANCOMYCIN: CPT

## 2019-04-21 PROCEDURE — 6360000002 HC RX W HCPCS: Performed by: FAMILY MEDICINE

## 2019-04-21 PROCEDURE — 94660 CPAP INITIATION&MGMT: CPT

## 2019-04-21 PROCEDURE — 6360000002 HC RX W HCPCS: Performed by: SPECIALIST

## 2019-04-21 PROCEDURE — 94150 VITAL CAPACITY TEST: CPT

## 2019-04-21 PROCEDURE — 36415 COLL VENOUS BLD VENIPUNCTURE: CPT

## 2019-04-21 PROCEDURE — 1200000000 HC SEMI PRIVATE

## 2019-04-21 RX ADMIN — HYDROCORTISONE: 25 CREAM TOPICAL at 09:07

## 2019-04-21 RX ADMIN — SIMETHICONE CHEW TAB 80 MG 80 MG: 80 TABLET ORAL at 17:18

## 2019-04-21 RX ADMIN — AMLODIPINE BESYLATE 10 MG: 5 TABLET ORAL at 09:05

## 2019-04-21 RX ADMIN — LEVOTHYROXINE SODIUM 88 MCG: 88 TABLET ORAL at 06:14

## 2019-04-21 RX ADMIN — HYDROCORTISONE: 25 CREAM TOPICAL at 19:50

## 2019-04-21 RX ADMIN — SIMETHICONE CHEW TAB 80 MG 80 MG: 80 TABLET ORAL at 19:51

## 2019-04-21 RX ADMIN — Medication 300 UNITS: at 19:50

## 2019-04-21 RX ADMIN — Medication 300 UNITS: at 09:07

## 2019-04-21 RX ADMIN — MUPIROCIN: 20 OINTMENT TOPICAL at 19:46

## 2019-04-21 RX ADMIN — OXYBUTYNIN CHLORIDE 5 MG: 5 TABLET ORAL at 19:51

## 2019-04-21 RX ADMIN — MUPIROCIN: 20 OINTMENT TOPICAL at 09:07

## 2019-04-21 RX ADMIN — METOPROLOL SUCCINATE 50 MG: 25 TABLET, EXTENDED RELEASE ORAL at 09:05

## 2019-04-21 RX ADMIN — ATORVASTATIN CALCIUM 10 MG: 10 TABLET, FILM COATED ORAL at 19:51

## 2019-04-21 RX ADMIN — OXYBUTYNIN CHLORIDE 5 MG: 5 TABLET ORAL at 09:05

## 2019-04-21 RX ADMIN — SIMETHICONE CHEW TAB 80 MG 80 MG: 80 TABLET ORAL at 06:14

## 2019-04-21 RX ADMIN — SIMETHICONE CHEW TAB 80 MG 80 MG: 80 TABLET ORAL at 11:24

## 2019-04-21 RX ADMIN — PAROXETINE HYDROCHLORIDE 10 MG: 10 TABLET, FILM COATED ORAL at 09:07

## 2019-04-21 RX ADMIN — CLOPIDOGREL BISULFATE 75 MG: 75 TABLET ORAL at 09:05

## 2019-04-21 RX ADMIN — VANCOMYCIN HYDROCHLORIDE 1250 MG: 10 INJECTION, POWDER, LYOPHILIZED, FOR SOLUTION INTRAVENOUS at 19:45

## 2019-04-21 RX ADMIN — PANTOPRAZOLE SODIUM 40 MG: 40 TABLET, DELAYED RELEASE ORAL at 06:14

## 2019-04-21 ASSESSMENT — PAIN SCALES - GENERAL: PAINLEVEL_OUTOF10: 0

## 2019-04-21 NOTE — PLAN OF CARE
Problem: Falls - Risk of:  Goal: Will remain free from falls  Description  Will remain free from falls  4/21/2019 1209 by Tash Beatty RN  Outcome: Met This Shift     Problem: Risk for Impaired Skin Integrity  Goal: Tissue integrity - skin and mucous membranes  Description  Structural intactness and normal physiological function of skin and  mucous membranes.   4/21/2019 1209 by Tash Beatty RN  Outcome: Met This Shift     Problem: Nutrition  Goal: Optimal nutrition therapy  4/21/2019 1209 by Tash Beatty RN  Outcome: Met This Shift

## 2019-04-21 NOTE — PROGRESS NOTES
vancomycin daily IV, blood cultures on admission and on 4/16 positive for coagulase-negative staph, for 4/18 cultures in progress. Continued anticipate rehab setting on discharge until mobility improved.   Electronically by Cassie Lowery DO  on 4/21/19 at 8:15 AM

## 2019-04-21 NOTE — PROGRESS NOTES
Patient is due for her catpress patch today.  only brought in one patch last week. I asked if he would go get another patch since it was due today and he said he would run home later and get it.

## 2019-04-22 LAB
CULTURE, BLOOD 2: ABNORMAL
CULTURE, BLOOD 2: ABNORMAL
ORGANISM: ABNORMAL

## 2019-04-22 PROCEDURE — 97110 THERAPEUTIC EXERCISES: CPT

## 2019-04-22 PROCEDURE — 97530 THERAPEUTIC ACTIVITIES: CPT

## 2019-04-22 PROCEDURE — 6370000000 HC RX 637 (ALT 250 FOR IP): Performed by: FAMILY MEDICINE

## 2019-04-22 PROCEDURE — 2580000003 HC RX 258: Performed by: SPECIALIST

## 2019-04-22 PROCEDURE — 1200000000 HC SEMI PRIVATE

## 2019-04-22 PROCEDURE — 6360000002 HC RX W HCPCS: Performed by: SPECIALIST

## 2019-04-22 PROCEDURE — 6360000002 HC RX W HCPCS: Performed by: FAMILY MEDICINE

## 2019-04-22 RX ORDER — MAGNESIUM HYDROXIDE/ALUMINUM HYDROXICE/SIMETHICONE 120; 1200; 1200 MG/30ML; MG/30ML; MG/30ML
30 SUSPENSION ORAL EVERY 6 HOURS PRN
Status: DISCONTINUED | OUTPATIENT
Start: 2019-04-22 | End: 2019-04-23 | Stop reason: HOSPADM

## 2019-04-22 RX ADMIN — SIMETHICONE CHEW TAB 80 MG 80 MG: 80 TABLET ORAL at 05:55

## 2019-04-22 RX ADMIN — SIMETHICONE CHEW TAB 80 MG 80 MG: 80 TABLET ORAL at 20:57

## 2019-04-22 RX ADMIN — CLOPIDOGREL BISULFATE 75 MG: 75 TABLET ORAL at 08:58

## 2019-04-22 RX ADMIN — MAGNESIUM HYDROXIDE 30 ML: 400 SUSPENSION ORAL at 10:27

## 2019-04-22 RX ADMIN — HYDROCORTISONE: 25 CREAM TOPICAL at 08:55

## 2019-04-22 RX ADMIN — OXYBUTYNIN CHLORIDE 5 MG: 5 TABLET ORAL at 08:56

## 2019-04-22 RX ADMIN — OXYBUTYNIN CHLORIDE 5 MG: 5 TABLET ORAL at 20:59

## 2019-04-22 RX ADMIN — SIMETHICONE CHEW TAB 80 MG 80 MG: 80 TABLET ORAL at 16:59

## 2019-04-22 RX ADMIN — SIMETHICONE CHEW TAB 80 MG 80 MG: 80 TABLET ORAL at 10:27

## 2019-04-22 RX ADMIN — VANCOMYCIN HYDROCHLORIDE 1250 MG: 10 INJECTION, POWDER, LYOPHILIZED, FOR SOLUTION INTRAVENOUS at 19:02

## 2019-04-22 RX ADMIN — PAROXETINE HYDROCHLORIDE 10 MG: 10 TABLET, FILM COATED ORAL at 08:57

## 2019-04-22 RX ADMIN — PANTOPRAZOLE SODIUM 40 MG: 40 TABLET, DELAYED RELEASE ORAL at 05:56

## 2019-04-22 RX ADMIN — MUPIROCIN: 20 OINTMENT TOPICAL at 08:56

## 2019-04-22 RX ADMIN — Medication 300 UNITS: at 08:56

## 2019-04-22 RX ADMIN — METOPROLOL SUCCINATE 50 MG: 25 TABLET, EXTENDED RELEASE ORAL at 08:57

## 2019-04-22 RX ADMIN — MUPIROCIN: 20 OINTMENT TOPICAL at 20:58

## 2019-04-22 RX ADMIN — LEVOTHYROXINE SODIUM 88 MCG: 88 TABLET ORAL at 05:56

## 2019-04-22 RX ADMIN — ATORVASTATIN CALCIUM 10 MG: 10 TABLET, FILM COATED ORAL at 20:57

## 2019-04-22 RX ADMIN — Medication 300 UNITS: at 20:57

## 2019-04-22 RX ADMIN — AMLODIPINE BESYLATE 10 MG: 5 TABLET ORAL at 08:57

## 2019-04-22 ASSESSMENT — PAIN SCALES - GENERAL
PAINLEVEL_OUTOF10: 0
PAINLEVEL_OUTOF10: 0

## 2019-04-22 NOTE — PLAN OF CARE
Problem: Falls - Risk of:  Goal: Will remain free from falls  Description  Will remain free from falls  4/22/2019 1212 by Martha Arriaga RN  Outcome: Met This Shift     Problem: Risk for Impaired Skin Integrity  Goal: Tissue integrity - skin and mucous membranes  Description  Structural intactness and normal physiological function of skin and  mucous membranes.   4/22/2019 1212 by Martha Arriaga RN  Outcome: Met This Shift     Problem: Nutrition  Goal: Optimal nutrition therapy  4/22/2019 1212 by Martha Arriaga RN  Outcome: Met This Shift

## 2019-04-22 NOTE — PROGRESS NOTES
[facility]  PROGRESS NOTE  Admit Date:  2019   NAME:  Leo Tran  MRN:  [id number]  :  1940  Age:   66 y.o. PCP:   Caty Ames DO, Tanja Honour, [address]  Day: Hospital Day: 11    Subjective:   Pt was seen and examined in a face to face fr le encounter for with CC of none  Chief Complaint   Patient presents with    Fatigue     Patient told to come to the ER by PCP because patient was to weak to get out of her car while at her PCP for her appointment today. HPI: no f/c/n/v/d   present- no questions   No f/c/N/V/D/RASH/ITCH   Patient ok. ROS:  CONSTITUTIONAL:   As in hpi    Scheduled Meds:   triamcinolone acetonide  40 mg Intramuscular Once    lidocaine PF  10 mL Intra-articular See Admin Instructions    vancomycin  1,250 mg Intravenous Q24H    pantoprazole  40 mg Oral QAM AC    heparin flush  300 Units Intracatheter BID    mupirocin   Topical BID    simethicone  80 mg Oral 4x Daily AC & HS    cloNIDine  1 patch Transdermal Weekly    hydrocortisone   Topical BID    amLODIPine  10 mg Oral Daily    metoprolol succinate  50 mg Oral Daily    levothyroxine  88 mcg Oral Daily    atorvastatin  10 mg Oral Nightly    clopidogrel  75 mg Oral Daily    oxybutynin  5 mg Oral BID    nitroGLYCERIN  1 patch Transdermal Daily    PARoxetine  10 mg Oral Daily    nitroGLYCERIN   Transdermal Nightly    brinzolamide-brimonidine  1 drop Ophthalmic BID    bimatoprost  1 drop Left Eye Nightly     Continuous Infusions:  PRN Meds:aluminum & magnesium hydroxide-simethicone, magnesium hydroxide, ondansetron, polyethyl glycol-propyl glycol 0.4-0.3 %    ALLERGIES: Pcn [penicillins] and Noroxin [norfloxacin]    Objective:   Vitals reviewed.   Vitals:    19 0905 19 2100 19 0745 19 0855   BP:  (!) 115/56 (!) 153/70    Pulse: 60 58 52 60   Resp:  18 16    Temp:  98.1 °F (36.7 °C) 98.9 °F (37.2 °C)    TempSrc:  Oral Oral    SpO2:  94% 95%    Weight:       Height:         Physical Exam   Vitals reviewed. Constitutional: The patient is awake, alert, inc bmi in bed  Skin: Warm and dry. No rashes were noted. HEENT: Eyes show round, and reactive pupils. Neck: Supple to movements. Chest: No use of accessory muscles to breathe. Symmetrical expansion. Cardiovascular: S1 and S2 are rhythmic and regular. Abdomen: Positive bowel sounds to auscultation. Benign to palpation. OBESE  Extremities: No clubbing, no cyanosis, edema. rle dressed ULCER WITH YELLOW BASE NO ERYTHEMA seems smaller no pain minswelling  CNS: awake and alert  Psych:  pleasant  Lines: Right upper arm with picc--4/15    I & O - 24hr:    Intake/Output Summary (Last 24 hours) at 4/22/2019 1156  Last data filed at 4/21/2019 2010  Gross per 24 hour   Intake 100 ml   Output --   Net 100 ml     No intake/output data recorded. Weight change:   LABS:   Lab Results   Component Value Date    SEDRATE 41 (H) 05/21/2012    SEDRATE 55 (H) 05/15/2012    SEDRATE 121 (H) 05/05/2012     Lab Results   Component Value Date    CRP <0.4 05/21/2012    CRP <0.4 05/15/2012     MICROBIOLOGY:    Organism   Date Value Ref Range Status   04/16/2019 Coagulase Negative Staphylococci (A)  Final     URINECULTURE  Urine Culture, Routine   Date Value Ref Range Status   04/13/2019 <10,000 CFU/mL  Mixed gram positive organisms    Final     Organism   Date Value Ref Range Status   04/16/2019 Coagulase Negative Staphylococci (A)  Final   04/12/2019 Staphylococcus epidermidis (A)  Final   04/12/2019 Staphylococcus hominis ssp hominis (A)  Final              RADIOLOGY:  XR KNEE LEFT (3 VIEWS)   Final Result      1. Joint space narrowing, moderate bilaterally in the femoropatellar   compartment and severe in the medial compartment. 2. Probable small effusion in the infrapatellar region.             IR FLUORO GUIDED CVA DEVICE PLMT/REPLACE/REMOVAL   Final Result   Successful placement of a 5 [facility] double-lumen Power   PICC line using ultrasound guidance via the right basilic  vein. IR ULTRASOUND GUIDANCE VASCULAR ACCESS   Final Result   Ultrasound guidance was provided during placement of a   PICC line. CT CHEST W WO CONTRAST   Final Result   1. Small bibasilar subsegmental atelectasis/infiltrates            US DUP LOWER EXTREMITY RIGHT MIGUELITO   Final Result   No evidence of right lower extremity deep venous thrombus   from common femoral vein to proximal calf. XR CHEST 1 VW   Final Result        Lab Results   Component Value Date     04/15/2019    K 3.9 04/15/2019     04/15/2019    CO2 22 04/15/2019    BUN 8 04/15/2019    CREATININE 0.8 04/15/2019    GFRAA >60 04/15/2019    LABGLOM >60 04/15/2019    GLUCOSE 88 04/15/2019    GLUCOSE 105 05/24/2012    PROT 5.9 04/13/2019    CALCIUM 9.2 04/15/2019    BILITOT 1.4 04/13/2019    ALKPHOS 100 04/13/2019    AST 12 04/13/2019    ALT 5 04/13/2019     Assessment/Plan:   66 y.o. female presented with   Chief Complaint   Patient presents with    Fatigue     Patient told to come to the ER by PCP because patient was to weak to get out of her car while at her PCP for her appointment today. Cons bacteremia 4/12, 4/16 source not clear has picc may need to remove cx tip     Pneumonia? Moderate protein-calorie malnutrition ([address]     F/u blood cx AGAIN   Plan:   · CONT  vanco QDAY  · Check trough okay  · IS  · Echo NOTED  · ceftriaxone had 8 days   · PICC 4/15  · Await final blood culture results 4/18 ngtd    Imaging and labs were reviewed per medical records and any ID pertinent labs were addressed with the patient.    SEE ORDERS        Electronically signed by Ravi Benedict MD on 4/22/2019 at 11:56 AM    Phone [phone]  Fax [phone]

## 2019-04-22 NOTE — PROGRESS NOTES
Occupational Therapy  O.T. Bedside Treatment Note    Date:2019  Patient Name: Chao Salguero  MRN: [id number]  : 1940  ROOM #: [id number]    Problem list / Diagnosis:   Patient Active Problem List   Diagnosis    Acute renal failure (ARF) ([address]    Pyelonephritis    Obstructive uropathy    HTN (hypertension)    Hyperlipidemia    Obstructive sleep apnea    Glaucoma    DJD (degenerative joint disease)    [facility]    Kidney stone    Pneumonia    Moderate protein-calorie malnutrition ([address]     Past Medical History:   Past Medical History:   Diagnosis Date    Cataract     right eye    Cholecystitis with cholelithiasis     Diverticulitis     Diverticulosis     DJD (degenerative joint disease)     DVT (deep venous thrombosis) (HCC)     RLE    Fibroids     Glaucoma     HTN (hypertension)     Hyperlipidemia     states on med for precaution    Morbid obesity ([facility]     GLENNA on CPAP     Osteoarthritis     PE (pulmonary embolism)     Renal cyst     right     Onset/Medical history: medical history reviewed  Past medical history: reviewed    The admitting diagnosis and active problem list as listed above have been reviewed prior to treatment. Nursing cleared the patient for treatment. Patient is agreeable to treatment.     Discharge recommendations: DINA   Equipment prescriptions needed: to be determined at rehab    [facility] Daily Activity Inpatient     AM-PAC Daily Activity Inpatient   How much help for putting on and taking off regular lower body clothing?:Total  How much help for Bathing?: A Lot  How much help for Toileting?: Total  How much help for putting on and taking off regular upper body clothing?: A Lot  How much help for taking care of personal grooming?: A Lot  How much help for eating meals?: A Little  AM-[facility] Inpatient Daily Activity Raw Score: 11  AM-PAC Inpatient ADL T-Scale Score : 29.04  ADL Inpatient CMS 0-100% Score: 70.42  ADL Inpatient CMS G-Code Modifier : rep Modified [place]          A:  -Balance: Sitting: fair at EOB during LE ROM ex's and in between stands       Standing: fair - /poor (L knee buckling noted; increased time and difficulty to achieve upright position d/t pain and weakness)  -Endurance: fair/fair - (2* to decreased endurance, strength, fatigue, pain)  -Edema: yes - BLE's and R UE; nsg aware; positioning provided  -Safety: fair (VC's for follow through)  - Pt/family education/training: bed mobility, transfer training, UE/LE dressing, BUE ROM ex's, ECT's  -Pt would benefit from additional ADLs, safety education, balance activities, transfer training, strength exercises, and over all endurance building.     P:  - Plan of care: Patient will be seen by OT 1-3x/wk  for therapeutic activity, ADL re-training, bed mobility,functional transfers, functional mobility, safety and fall prevention, balance and endurance activities, instruction and training in energy conservation principles, and   patient and family education.   - Patient and/or family understands diagnosis, prognosis and plan of care: yes   - ADL retraining, bathroom safety, DME    Treatment minutes: [address]

## 2019-04-22 NOTE — PROGRESS NOTES
Physical Therapy  Physical Therapy  Daily Treatment Note  4/22/2019  [id number]                      Gisselle Clark   [id number]                              1940    Patient Active Problem List   Diagnosis    [facility]    Pyelonephritis    Obstructive uropathy    HTN (hypertension)    Hyperlipidemia    Obstructive sleep apnea    Glaucoma    DJD (degenerative joint disease)    Morbid obesity ([facility]    Kidney stone    Pneumonia    Moderate protein-calorie malnutrition ([facility]       Recommendation for discharge: Subacute  Equipment prescriptions needed: none    AM-[facility] Mobility Inpatient   How much difficulty turning over in bed?: None  How much difficulty sitting down on / standing up from a chair with arms?: A Lot  How much difficulty moving from lying on back to sitting on side of bed?: A Lot  How much help from another person moving to and from a bed to a chair?: Total  How much help from another person needed to walk in hospital room?: Total  How much help from another person for climbing 3-5 steps with a railing?: Total  AM-PAC Inpatient Mobility Raw Score : 11  AM-PAC Inpatient T-Scale Score : 33.86  Mobility Inpatient CMS 0-100% Score: 72.57  Mobility Inpatient CMS G-Code Modifier : CL      Precautions: falls, alarm    S: Patient cleared by nursing for treatment. Patient is agreeable to treatment. Pt c/o pain in L knee. Pt had injection in L knee earlier today. Pain status: (measured on a visual analog scale with 0=no pain and 10=excruciating pain) 7/10. O: Pt was instructed in and performed the following:   Bed Mobility- Supine to sit- Maximal assistance of 2      Scooting- Moderate assistance of 2    Sit to supine- Maximal assistance of 2    Transfers-sit to stand- Maximal assistance of 2    Gait: NA   Steps: NA  Treatment: Pt transferred to side of bed and sat up x 10 minutes to increase dynamic sitting balance and activity tolerance. Pt performed 2 STS's .  Pt performed supine ex's: ankle pumps, heel slides, quad sets, and hip abd/add x 10-15 reps. V/C were needed for correct technique. Comment: Call light left by patient. Pt RTB at end of treatment, alarm was activated. A: Pt performed ex's AROM. Pt had difficulty standing today, pt performed 2 STS's w/ maximal assist of 2 standing for under 5 secs during the first STS, and 30 secs tolerated during the second STS. Pt displayed a kyphotic posture, standing 2/3 upright w/ knees flexed. Pt unable to amb d/t pain in L knee and decreased endurance during standing. P: Continue with physical therapy   [address] [place] [address], [facility] Andrés. Ida, PARVIZ       Goals to be met in 3 days. Bed mobility- Moderate assistance   Transfers- Moderate assistance   Ambulation- Maximal assistance for 5 feet using wheeled walker     Increase strength in affected muscle groups by 1/3 grade  Increase balance to allow for improvement towards functional goals.   Increase endurance to allow for improvement towards functional goals.

## 2019-04-22 NOTE — PLAN OF CARE
Problem: Falls - Risk of:  Goal: Will remain free from falls  Description  Will remain free from falls  4/22/2019 0439 by Justin Bueno RN  Outcome: Met This Shift  4/21/2019 2311 by Wilmar Black RN  Outcome: Met This Shift  Goal: Absence of physical injury  Description  Absence of physical injury  4/22/2019 0439 by Justin Bueno RN  Outcome: Met This Shift  4/21/2019 2311 by Wilmar Black RN  Outcome: Met This Shift     Problem: Risk for Impaired Skin Integrity  Goal: Tissue integrity - skin and mucous membranes  Description  Structural intactness and normal physiological function of skin and  mucous membranes.   Outcome: Met This Shift     Problem: Nutrition  Goal: Optimal nutrition therapy  Outcome: Met This Shift

## 2019-04-22 NOTE — PROGRESS NOTES
Progress Note    History:  Heartburn/acid reflux after eating some of lunch meal (ham, peas, mashed potatoes) yesterday, then again after eating bowl of chicken noodle soup for supper. Short lasting symptoms, no intervention sought for either episode. No nausea or emesis. Passing flatus but still with no bowel movement. Denies urinary complaints. Doing her exercises in bed but has not been out of bed since Friday. No shortness of breath or dyspnea, no cough, no chest pain or sensation of palpitations. Patient's medical and surgical history, medications and allergies were reviewed. Interval notes of consultants, nurses, therapists, and ancillary services were also reviewed, as well as the medical administration record. Physical Exam:  Vitals:    04/21/19 0730 04/21/19 0905 04/21/19 2100 04/22/19 0745   BP: (!) 143/65  (!) 115/56 (!) 153/70   Pulse: 53 60 58 52   Resp: 16  18 16   Temp: 98.8 °F (37.1 °C)  98.1 °F (36.7 °C) 98.9 °F (37.2 °C)   TempSrc: Oral  Oral Oral   SpO2: 97%  94% 95%   Weight:       Height:           Sensorium clear, high cerebral intact. No icterus or cyanosis. Lung auscultation clear, respirations nonlabored, no rales or wheeze. Heart rhythm is regular, no S3 or S4 is appreciated, heart rate is 56/m. Abdomen is soft, obese, with active bowel sounds all quadrants, tenderness is denied with palpation. Right calf remains soft, slowly healing granulation to right upper posterior lateral calf wound, induration without change. Assessment/Plan:   1. Sepsis/bacteremia due to staph epidermidis and staph hominis  2. Open wound right leg, contusion right leg sustained March 15, resolved cellulitis  3. Mobility impairment secondary to #2  4. Dehydration secondary to #1, pulmonary atelectasis  5. Essential hypertension, PACs, mitral annular calcification  6. Super morbid obesity, GERD, history of diverticular disease  7. Hypothyroidism on supplementation  8.  Hypercholesterolemia on

## 2019-04-23 VITALS
OXYGEN SATURATION: 93 % | SYSTOLIC BLOOD PRESSURE: 148 MMHG | BODY MASS INDEX: 50.02 KG/M2 | WEIGHT: 293 LBS | TEMPERATURE: 98.2 F | RESPIRATION RATE: 16 BRPM | DIASTOLIC BLOOD PRESSURE: 70 MMHG | HEIGHT: 64 IN | HEART RATE: 57 BPM

## 2019-04-23 LAB
ALBUMIN SERPL-MCNC: 3.6 G/DL (ref 3.5–5.2)
ALP BLD-CCNC: 88 U/L (ref 35–104)
ALT SERPL-CCNC: 11 U/L (ref 0–32)
ANION GAP SERPL CALCULATED.3IONS-SCNC: 8 MMOL/L (ref 7–16)
AST SERPL-CCNC: 18 U/L (ref 0–31)
BILIRUB SERPL-MCNC: 1.1 MG/DL (ref 0–1.2)
BLOOD CULTURE, ROUTINE: NORMAL
BUN BLDV-MCNC: 18 MG/DL (ref 8–23)
CALCIUM SERPL-MCNC: 9.3 MG/DL (ref 8.6–10.2)
CHLORIDE BLD-SCNC: 107 MMOL/L (ref 98–107)
CO2: 26 MMOL/L (ref 22–29)
CREAT SERPL-MCNC: 0.8 MG/DL (ref 0.5–1)
CULTURE, BLOOD 2: NORMAL
GFR AFRICAN AMERICAN: >60
GFR NON-AFRICAN AMERICAN: >60 ML/MIN/1.73
GLUCOSE BLD-MCNC: 87 MG/DL (ref 74–99)
HCT VFR BLD CALC: 40.2 % (ref 34–48)
HEMOGLOBIN: 13.1 G/DL (ref 11.5–15.5)
MCH RBC QN AUTO: 28.9 PG (ref 26–35)
MCHC RBC AUTO-ENTMCNC: 32.6 % (ref 32–34.5)
MCV RBC AUTO: 88.7 FL (ref 80–99.9)
PDW BLD-RTO: 14.6 FL (ref 11.5–15)
PLATELET # BLD: 227 E9/L (ref 130–450)
PMV BLD AUTO: 10.1 FL (ref 7–12)
POTASSIUM SERPL-SCNC: 3.8 MMOL/L (ref 3.5–5)
RBC # BLD: 4.53 E12/L (ref 3.5–5.5)
SODIUM BLD-SCNC: 141 MMOL/L (ref 132–146)
TOTAL PROTEIN: 5.8 G/DL (ref 6.4–8.3)
WBC # BLD: 8 E9/L (ref 4.5–11.5)

## 2019-04-23 PROCEDURE — 36415 COLL VENOUS BLD VENIPUNCTURE: CPT

## 2019-04-23 PROCEDURE — 85027 COMPLETE CBC AUTOMATED: CPT

## 2019-04-23 PROCEDURE — 36592 COLLECT BLOOD FROM PICC: CPT

## 2019-04-23 PROCEDURE — 6360000002 HC RX W HCPCS: Performed by: FAMILY MEDICINE

## 2019-04-23 PROCEDURE — 80053 COMPREHEN METABOLIC PANEL: CPT

## 2019-04-23 PROCEDURE — 97110 THERAPEUTIC EXERCISES: CPT

## 2019-04-23 PROCEDURE — 6370000000 HC RX 637 (ALT 250 FOR IP): Performed by: FAMILY MEDICINE

## 2019-04-23 PROCEDURE — 97530 THERAPEUTIC ACTIVITIES: CPT

## 2019-04-23 RX ORDER — SIMETHICONE 80 MG
80 TABLET,CHEWABLE ORAL
Qty: 180 TABLET | Refills: 3 | DISCHARGE
Start: 2019-04-23

## 2019-04-23 RX ORDER — LEVOTHYROXINE SODIUM 88 UG/1
88 TABLET ORAL DAILY
Qty: 30 TABLET | Refills: 3 | DISCHARGE
Start: 2019-04-24

## 2019-04-23 RX ORDER — OXYBUTYNIN CHLORIDE 5 MG/1
5 TABLET ORAL 2 TIMES DAILY
Qty: 90 TABLET | Refills: 3 | DISCHARGE
Start: 2019-04-23

## 2019-04-23 RX ORDER — TRAMADOL HYDROCHLORIDE 50 MG/1
50 TABLET ORAL EVERY 6 HOURS PRN
Status: DISCONTINUED | OUTPATIENT
Start: 2019-04-23 | End: 2019-04-23 | Stop reason: HOSPADM

## 2019-04-23 RX ORDER — PANTOPRAZOLE SODIUM 40 MG/1
40 TABLET, DELAYED RELEASE ORAL
Qty: 30 TABLET | Refills: 3 | DISCHARGE
Start: 2019-04-24

## 2019-04-23 RX ORDER — ACETAMINOPHEN 325 MG/1
650 TABLET ORAL EVERY 6 HOURS PRN
Status: DISCONTINUED | OUTPATIENT
Start: 2019-04-23 | End: 2019-04-23 | Stop reason: HOSPADM

## 2019-04-23 RX ADMIN — MUPIROCIN: 20 OINTMENT TOPICAL at 10:37

## 2019-04-23 RX ADMIN — HYDROCORTISONE: 25 CREAM TOPICAL at 10:38

## 2019-04-23 RX ADMIN — PANTOPRAZOLE SODIUM 40 MG: 40 TABLET, DELAYED RELEASE ORAL at 06:20

## 2019-04-23 RX ADMIN — PAROXETINE HYDROCHLORIDE 10 MG: 10 TABLET, FILM COATED ORAL at 10:34

## 2019-04-23 RX ADMIN — AMLODIPINE BESYLATE 10 MG: 5 TABLET ORAL at 10:33

## 2019-04-23 RX ADMIN — SIMETHICONE CHEW TAB 80 MG 80 MG: 80 TABLET ORAL at 17:09

## 2019-04-23 RX ADMIN — Medication 300 UNITS: at 10:34

## 2019-04-23 RX ADMIN — LEVOTHYROXINE SODIUM 88 MCG: 88 TABLET ORAL at 06:20

## 2019-04-23 RX ADMIN — METOPROLOL SUCCINATE 50 MG: 25 TABLET, EXTENDED RELEASE ORAL at 10:34

## 2019-04-23 RX ADMIN — OXYBUTYNIN CHLORIDE 5 MG: 5 TABLET ORAL at 10:34

## 2019-04-23 RX ADMIN — SIMETHICONE CHEW TAB 80 MG 80 MG: 80 TABLET ORAL at 06:20

## 2019-04-23 RX ADMIN — CLOPIDOGREL BISULFATE 75 MG: 75 TABLET ORAL at 10:34

## 2019-04-23 RX ADMIN — SIMETHICONE CHEW TAB 80 MG 80 MG: 80 TABLET ORAL at 13:13

## 2019-04-23 ASSESSMENT — PAIN SCALES - GENERAL: PAINLEVEL_OUTOF10: 5

## 2019-04-23 NOTE — PROGRESS NOTES
Progress Note    History:  Out of bed and stood at bedside once yesterday, lasted \"three minutes\", sat back down, was unable to move left knee due to pain, unable to walk due to same, despite assistance. Right leg felt fine. Some indigestion with eating but intake for breakfast and supper meals stated satisfactory, had appetite, no nausea or recurrence of heartburn. Passing flatus but no bowel movement. Denies urinary complaints. No shortness of breath or chest pain. Patient's medical and surgical history, medications and allergies were reviewed. Interval notes of consultants, nurses, therapists, and ancillary services were also reviewed, as well as the medical administration record. Physical Exam:  Vitals:    04/22/19 0745 04/22/19 0855 04/22/19 1600 04/22/19 2350   BP: (!) 153/70  (!) 121/56    Pulse: 52 60 55 50   Resp: 16  18    Temp: 98.9 °F (37.2 °C)  98.4 °F (36.9 °C)    TempSrc: Oral      SpO2: 95%  94% 93%   Weight:       Height:           Anicteric, no acute distress, no cyanosis. Sensorium clear. Lungs clear, nonlabored respirations. Heart tones same, no S3S4, rhythm regular, HR 60/min. Left knee tender medially, no crepitance, no erythema. Right leg wound continued slow healing but centrally umbilicated. Distal circ intact. Assessment/Plan:   1. Sepsis due to coag neg staph  2. Open wound right leg, contusion right leg, sustained 3/15  3. Mobility impairment due to #2  4. Dehydration, pulmonary atelectasis due to #1  5. Ess hypertension, PAC's, mitral Ca++  6. Supermorbid obesity, GERD, constipation, hx diverticulosis/itis  7. Hypothyroidism, hypercholesterolemia, resolved hypokalemia    Discussed with the patient and spouse, continue therapy attempts to achieve mobility, rehab setting SNF to establish independence to return home as goal, follow for determination atb type and length use. Analgesic ordered in hopes left knee pain can be attenuated to allow movement.   Electronically by Weinstein Yasmin DO Maira  on 4/23/19 at 8:17 AM

## 2019-04-23 NOTE — PROGRESS NOTES
Physical Therapy  Physical Therapy  Daily Treatment Note  4/23/2019  [id number]                      Keren Ambrocio   [id number]                              1940    Patient Active Problem List   Diagnosis    [facility]F) [facility]    Pyelonephritis    Obstructive uropathy    HTN (hypertension)    Hyperlipidemia    Obstructive sleep apnea    Glaucoma    DJD (degenerative joint disease)    Morbid obesity ([facility]    Kidney stone    Pneumonia    Moderate protein-calorie malnutrition ([facility] [facility]       Recommendation for discharge: Subacute  Equipment prescriptions needed: none    AM-[facility] Mobility Inpatient   How much difficulty turning over in bed?: None  How much difficulty sitting down on / standing up from a chair with arms?: A Lot  How much difficulty moving from lying on back to sitting on side of bed?: A Lot  How much help from another person moving to and from a bed to a chair?: Total  How much help from another person needed to walk in hospital room?: Total  How much help from another person for climbing 3-5 steps with a railing?: Total  AM-PAC Inpatient Mobility Raw Score : 11  AM-[facility] Inpatient T-Scale Score : 33.86  Mobility Inpatient CMS 0-100% Score: 72.57  Mobility Inpatient CMS G-Code Modifier : CL      Precautions: falls, alarm    S: Patient cleared by nursing for treatment. Patient is agreeable to treatment. Pt c/o pain in L knee. Pain status: (measured on a visual analog scale with 0=no pain and 10=excruciating pain) 4/10. O: Pt was instructed in and performed the following:   Bed Mobility- Supine to sit- Maximal assistance of 2      Scooting- Moderate assistance of 2    Sit to supine- Maximal assistance of 2    Transfers-sit to stand- Maximal assistance of 2    Gait: NA   Steps: NA  Treatment: Pt performed supine ex's: ankle pumps, heel slides, quad sets, and hip abd/add x 10-15 reps. V/C were needed for correct technique. Pt transferred to side of bed and sat for 20 minutes to increase dynamic sitting balance and activity tolerance. Pt performed 2 STS's and side stepped to [facility]. Comment: Call light left by patient. Pt RTB at end of treatment, alarm was activated. A: Pt performed ex's AROM. Pt performed 2 STS's w/ maximal assist of 2 standing, but improved standing time to 3 minutes during the first STS, and 1 minute tolerated during the second STS. Pt's standing time limited d/t knee buckling during both STS's and sat abruptly. Pt displayed a kyphotic posture, standing 2/3 upright w/ knees flexed. Pt unable to amb d/t pain in L knee and knees buckling. P: Continue with physical therapy   [address] [place] [address], [facility]  Salena Prieto, PTA       Goals to be met in 3 days. Bed mobility- Moderate assistance   Transfers- Moderate assistance   Ambulation- Maximal assistance for 5 feet using wheeled walker     Increase strength in affected muscle groups by 1/3 grade  Increase balance to allow for improvement towards functional goals.   Increase endurance to allow for improvement towards functional goals.

## 2019-04-23 NOTE — CARE COORDINATION
Discharge Planning:  arranged for 4:30pm [facility], [phone], to transport pt to [facility], [address], Fax [phone], for a skilled level of care. SW notified pt's  in room.   Electronically signed by VICKY Robertson on 4/23/2019 at 3:30 PM

## 2019-04-23 NOTE — PROGRESS NOTES
Successful placement of a 5 Romanian double-lumen Power   PICC line using ultrasound guidance via the right basilic  vein. IR ULTRASOUND GUIDANCE VASCULAR ACCESS   Final Result   Ultrasound guidance was provided during placement of a   PICC line. CT CHEST W WO CONTRAST   Final Result   1. Small bibasilar subsegmental atelectasis/infiltrates            US DUP LOWER EXTREMITY RIGHT MIGUELITO   Final Result   No evidence of right lower extremity deep venous thrombus   from common femoral vein to proximal calf. XR CHEST 1 VW   Final Result        Lab Results   Component Value Date     04/23/2019    K 3.8 04/23/2019     04/23/2019    CO2 26 04/23/2019    BUN 18 04/23/2019    CREATININE 0.8 04/23/2019    GFRAA >60 04/23/2019    LABGLOM >60 04/23/2019    GLUCOSE 87 04/23/2019    GLUCOSE 105 05/24/2012    PROT 5.8 04/23/2019    CALCIUM 9.3 04/23/2019    BILITOT 1.1 04/23/2019    ALKPHOS 88 04/23/2019    AST 18 04/23/2019    ALT 11 04/23/2019     Assessment/Plan:   66 y.o. female presented with   Chief Complaint   Patient presents with    Fatigue     Patient told to come to the ER by PCP because patient was to weak to get out of her car while at her PCP for her appointment today. Cons bacteremia 4/12, 4/16 source not clear has picc may need to remove cx tip     Pneumonia? Moderate protein-calorie malnutrition ([address]     F/u blood cx AGAIN   Plan:   · CONT  vanco QDAY   · Will remove picc  · Check trough okay  · IS  · Echo NOTED  · ceftriaxone had 8 days   · PICC 4/15  · Await final blood culture results 4/18 ngtd can d/c off atbx    Imaging and labs were reviewed per medical records and any ID pertinent labs were addressed with the patient.    SEE ORDERS        Electronically signed by Maribeth Wise MD on 4/23/2019 at 10:02 AM    Phone [phone]  Fax [phone]

## 2019-05-20 NOTE — DISCHARGE SUMMARY
[address]                               DISCHARGE SUMMARY    PATIENT NAME: Morgan Castle                  :        1940  MED REC NO:   [id number]                            ROOM:       0140  ACCOUNT NO:   [de-identified]                           ADMIT DATE: 2019  PROVIDER:     Marcelino Puga                  DISCHARGE DATE: 2019      HOSPITAL COURSE:  This 77-year-old female was admitted through the  emergency suite following presentation with mobility impairment and  dehydration. For months prior to admission, she has experienced  appetite loss in addition to weight loss with intermittent nausea and  some dysphagia. One month prior to admission, she sustained a fall at a  restaurant parking lot, landing on her right leg with subsequent  inability to achieve her prior level of functional mobility. The leg  developed swelling, bruising, and an unnoticed wound on the posterior  aspect of the calf. She became unable to bear any weight whatsoever on  the extremity, and on presentation to outpatient office and emergency  room the day of admission, required maximal assistance to obtain care  secondary to complete lack of function of the right knee joint and leg. At the time of injury xray examination had been negative for fracture. Consideration on admission was to multiple etiologies for symptoms,   including musculoskeletal failure in the extremity and constitutional  Etiologies complicating her leg wound, all leading to mobility impairment. On admission, the patient was placed on intravenous fluid support in  addition to empiric antibiosis. Chest CT scanning confirmed atelectasis  without evidence of actual infiltrate which had been questioned on x-ray  imaging. Physical and occupational therapy were consulted for her  mobility and physical function.   Venous ultrasound testing was negative  for right leg thrombosis. First aid was administered with antibiotic  cream and dressing to her right calf wound. Blood cultures did reveal  coagulase-negative staphylococcus, which was treated with dual  antibiotic therapy. Consideration as to source was not confirmed by  urinary culture. Echocardiography did not reveal evidence of valvular  vegetation. She did remain afebrile with utilization of vancomycin and  rocephin intravenously. Proton pump inhibition on intravenous and by  discharge oral basis was utilized for her potential swallowing  dysfunction. No further gastrointestinal symptomatology was evident  during inpatient stay. Hypokalemia was treated with intravenous and  subsequently oral medication. Antihypertensives were maintained as  preadmission. Simethicone was utilized for gastrointestinal gas and  distention. She was seen in consultation by Dr. Bailey Bar' orthopedic  group for left knee pain and movement symptoms. Marcaine and Kenalog  were utilized in intraarticular injection on 04/19 with satisfactory  improvement in the left knee joint. Despite the use of maximal  assistance and physical therapy intervention, her right knee and lower  extremity mobility status did remain suboptimal; therefore, arrangements  were made during inpatient stay for followup in skilled nursing rehab  following discharge as she was not felt able to safely tolerate home-going  therapy at that point. Other than potassium suppression, which corrected with treatment,  chemistries during the inpatient stay remained satisfactory. Subsequent  blood cultures did reveal no evidence of further Staph aureus growth. By the time of discharge, antibiotic therapy was elected to be  discontinued with followup on outpatient testing. Glaucoma care was administered with her preadmission medications during  inpatient stay.   Hydrocortisone 2.5% cream was utilized to treat collar  area distribution

## 2019-06-17 NOTE — ED NOTES
Nurse to nurse called to TELE and given to Alexandra Benedict RN at this time. ER nurse and supporting staff to take patient upstairs to patient's ready bed.       Jonnie Goff RN  04/12/19 2042 oral

## 2021-07-05 NOTE — PROGRESS NOTES
Physical Therapy  Physical Therapy  Daily Treatment Note  4/16/2019  [id number]                      Gisselle Clark   [id number]                              1940    Patient Active Problem List   Diagnosis    [facility]) [facility] [facility]    Pyelonephritis    Obstructive uropathy    HTN (hypertension)    Hyperlipidemia    Obstructive sleep apnea    Glaucoma    DJD (degenerative joint disease)    Morbid obesity ([facility] [facility]    Kidney stone    Pneumonia    Moderate protein-calorie malnutrition ([facility]       Recommendation for discharge: Subacute  Equipment prescriptions needed:none    AM-[facility] Mobility Inpatient   How much difficulty turning over in bed?: A Lot  How much difficulty sitting down on / standing up from a chair with arms?: A Lot  How much difficulty moving from lying on back to sitting on side of bed?: A Lot  How much help from another person moving to and from a bed to a chair?: A Lot  How much help from another person needed to walk in hospital room?: A Lot  How much help from another person for climbing 3-5 steps with a railing?: Total  AM-PAC Inpatient Mobility Raw Score : 11  AM-PAC Inpatient T-Scale Score : 33.86  Mobility Inpatient CMS 0-100% Score: 72.57  Mobility Inpatient CMS G-Code Modifier : CL      Precautions: falls, alarm    S: Patient cleared by nursing for treatment. Patient is agreeable to treatment. Pain status: (measured on a visual analog scale with 0=no pain and 10=excruciating pain) 0/10. O: Pt was instructed in and performed the following:   Bed Mobility- Supine to sit-Maximal assistance 2     Scooting- Maximal Assist     Sit to supine-Maximal assistance 3   Transfers-sit to stand- Maximal assistance 2     Gait: na   Steps: na  Treatment: Pt. Transferred to b, stood using bariatric walker and chux to assist lifting off bed.  in front of walker for morale support. Pt. Performed seated ankle pumps, long arc quad, heel raises, x 10 reps.    Attempted a second stand, unable to perform. Returned to supine as above with 2 assist for BLE's. Bed put in Trendelenberg to pull up. Comment: Call light left by patient. A: Ada. Well, fearful of falling. Pt with weak BLE's for standing. P: Continue with physical therapy   Little Bussing, PTA       Goals to be met in 3 days. Bed mobility- Moderate assistance   Transfers- Moderate assistance   Ambulation- Maximal assistance for 5 feet using wheeled walker     Increase strength in affected muscle groups by 1/3 grade  Increase balance to allow for improvement towards functional goals.   Increase endurance to allow for improvement towards functional goals.     There are no Wet Read(s) to document.

## 2022-11-28 ENCOUNTER — HOSPITAL ENCOUNTER (INPATIENT)
Age: 82
LOS: 2 days | Discharge: HOME HEALTH | DRG: 177 | End: 2022-11-30
Attending: EMERGENCY MEDICINE | Admitting: INTERNAL MEDICINE
Payer: MEDICARE

## 2022-11-28 ENCOUNTER — APPOINTMENT (OUTPATIENT)
Dept: GENERAL RADIOLOGY | Age: 82
DRG: 177 | End: 2022-11-28
Payer: MEDICARE

## 2022-11-28 DIAGNOSIS — I48.91 ATRIAL FIBRILLATION WITH RAPID VENTRICULAR RESPONSE (HCC): Primary | ICD-10-CM

## 2022-11-28 DIAGNOSIS — J96.01 ACUTE RESPIRATORY FAILURE WITH HYPOXIA (HCC): ICD-10-CM

## 2022-11-28 LAB
ANION GAP SERPL CALCULATED.3IONS-SCNC: 12 MMOL/L (ref 7–16)
BASOPHILS ABSOLUTE: 0.01 E9/L (ref 0–0.2)
BASOPHILS RELATIVE PERCENT: 0.2 % (ref 0–2)
BUN BLDV-MCNC: 16 MG/DL (ref 6–23)
C-REACTIVE PROTEIN: 2.5 MG/DL (ref 0–0.4)
CALCIUM SERPL-MCNC: 9.2 MG/DL (ref 8.6–10.2)
CHLORIDE BLD-SCNC: 104 MMOL/L (ref 98–107)
CO2: 25 MMOL/L (ref 22–29)
CREAT SERPL-MCNC: 0.8 MG/DL (ref 0.5–1)
EOSINOPHILS ABSOLUTE: 0.04 E9/L (ref 0.05–0.5)
EOSINOPHILS RELATIVE PERCENT: 0.6 % (ref 0–6)
FIBRINOGEN: 442 MG/DL (ref 200–400)
GFR SERPL CREATININE-BSD FRML MDRD: >60 ML/MIN/1.73
GLUCOSE BLD-MCNC: 118 MG/DL (ref 74–99)
HCT VFR BLD CALC: 42.4 % (ref 34–48)
HEMOGLOBIN: 14.3 G/DL (ref 11.5–15.5)
IMMATURE GRANULOCYTES #: 0.03 E9/L
IMMATURE GRANULOCYTES %: 0.5 % (ref 0–5)
INFLUENZA A BY PCR: NOT DETECTED
INFLUENZA B BY PCR: NOT DETECTED
LACTATE DEHYDROGENASE: 229 U/L (ref 135–214)
LYMPHOCYTES ABSOLUTE: 0.84 E9/L (ref 1.5–4)
LYMPHOCYTES RELATIVE PERCENT: 12.9 % (ref 20–42)
MCH RBC QN AUTO: 29.9 PG (ref 26–35)
MCHC RBC AUTO-ENTMCNC: 33.7 % (ref 32–34.5)
MCV RBC AUTO: 88.7 FL (ref 80–99.9)
MONOCYTES ABSOLUTE: 1.07 E9/L (ref 0.1–0.95)
MONOCYTES RELATIVE PERCENT: 16.5 % (ref 2–12)
NEUTROPHILS ABSOLUTE: 4.5 E9/L (ref 1.8–7.3)
NEUTROPHILS RELATIVE PERCENT: 69.3 % (ref 43–80)
PDW BLD-RTO: 14.2 FL (ref 11.5–15)
PLATELET # BLD: 177 E9/L (ref 130–450)
PMV BLD AUTO: 9.9 FL (ref 7–12)
POTASSIUM SERPL-SCNC: 3.3 MMOL/L (ref 3.5–5)
PRO-BNP: 3055 PG/ML (ref 0–450)
RBC # BLD: 4.78 E12/L (ref 3.5–5.5)
SARS-COV-2, NAAT: DETECTED
SODIUM BLD-SCNC: 141 MMOL/L (ref 132–146)
TROPONIN, HIGH SENSITIVITY: 35 NG/L (ref 0–9)
TROPONIN, HIGH SENSITIVITY: 39 NG/L (ref 0–9)
WBC # BLD: 6.5 E9/L (ref 4.5–11.5)

## 2022-11-28 PROCEDURE — 6370000000 HC RX 637 (ALT 250 FOR IP): Performed by: INTERNAL MEDICINE

## 2022-11-28 PROCEDURE — 83880 ASSAY OF NATRIURETIC PEPTIDE: CPT

## 2022-11-28 PROCEDURE — 94640 AIRWAY INHALATION TREATMENT: CPT

## 2022-11-28 PROCEDURE — 96367 TX/PROPH/DG ADDL SEQ IV INF: CPT

## 2022-11-28 PROCEDURE — 85384 FIBRINOGEN ACTIVITY: CPT

## 2022-11-28 PROCEDURE — 96365 THER/PROPH/DIAG IV INF INIT: CPT

## 2022-11-28 PROCEDURE — 96375 TX/PRO/DX INJ NEW DRUG ADDON: CPT

## 2022-11-28 PROCEDURE — 2500000003 HC RX 250 WO HCPCS: Performed by: EMERGENCY MEDICINE

## 2022-11-28 PROCEDURE — 82728 ASSAY OF FERRITIN: CPT

## 2022-11-28 PROCEDURE — 93005 ELECTROCARDIOGRAM TRACING: CPT | Performed by: EMERGENCY MEDICINE

## 2022-11-28 PROCEDURE — 2580000003 HC RX 258: Performed by: EMERGENCY MEDICINE

## 2022-11-28 PROCEDURE — 6360000002 HC RX W HCPCS: Performed by: INTERNAL MEDICINE

## 2022-11-28 PROCEDURE — 36415 COLL VENOUS BLD VENIPUNCTURE: CPT

## 2022-11-28 PROCEDURE — 94664 DEMO&/EVAL PT USE INHALER: CPT

## 2022-11-28 PROCEDURE — 96366 THER/PROPH/DIAG IV INF ADDON: CPT

## 2022-11-28 PROCEDURE — 96372 THER/PROPH/DIAG INJ SC/IM: CPT

## 2022-11-28 PROCEDURE — 83615 LACTATE (LD) (LDH) ENZYME: CPT

## 2022-11-28 PROCEDURE — 87635 SARS-COV-2 COVID-19 AMP PRB: CPT

## 2022-11-28 PROCEDURE — 84484 ASSAY OF TROPONIN QUANT: CPT

## 2022-11-28 PROCEDURE — 71046 X-RAY EXAM CHEST 2 VIEWS: CPT

## 2022-11-28 PROCEDURE — 87502 INFLUENZA DNA AMP PROBE: CPT

## 2022-11-28 PROCEDURE — 99285 EMERGENCY DEPT VISIT HI MDM: CPT

## 2022-11-28 PROCEDURE — 85025 COMPLETE CBC W/AUTO DIFF WBC: CPT

## 2022-11-28 PROCEDURE — 6370000000 HC RX 637 (ALT 250 FOR IP)

## 2022-11-28 PROCEDURE — 80048 BASIC METABOLIC PNL TOTAL CA: CPT

## 2022-11-28 PROCEDURE — 3E0333Z INTRODUCTION OF ANTI-INFLAMMATORY INTO PERIPHERAL VEIN, PERCUTANEOUS APPROACH: ICD-10-PCS | Performed by: INTERNAL MEDICINE

## 2022-11-28 PROCEDURE — 2060000000 HC ICU INTERMEDIATE R&B

## 2022-11-28 PROCEDURE — 86140 C-REACTIVE PROTEIN: CPT

## 2022-11-28 PROCEDURE — XW0DXM6 INTRODUCTION OF BARICITINIB INTO MOUTH AND PHARYNX, EXTERNAL APPROACH, NEW TECHNOLOGY GROUP 6: ICD-10-PCS | Performed by: INTERNAL MEDICINE

## 2022-11-28 PROCEDURE — 96376 TX/PRO/DX INJ SAME DRUG ADON: CPT

## 2022-11-28 PROCEDURE — 6360000002 HC RX W HCPCS: Performed by: EMERGENCY MEDICINE

## 2022-11-28 RX ORDER — DULOXETIN HYDROCHLORIDE 30 MG/1
30 CAPSULE, DELAYED RELEASE ORAL DAILY
COMMUNITY

## 2022-11-28 RX ORDER — PROCHLORPERAZINE EDISYLATE 5 MG/ML
5 INJECTION INTRAMUSCULAR; INTRAVENOUS EVERY 6 HOURS PRN
Status: DISCONTINUED | OUTPATIENT
Start: 2022-11-28 | End: 2022-11-30 | Stop reason: HOSPADM

## 2022-11-28 RX ORDER — BUDESONIDE AND FORMOTEROL FUMARATE DIHYDRATE 80; 4.5 UG/1; UG/1
2 AEROSOL RESPIRATORY (INHALATION) 2 TIMES DAILY
Status: CANCELLED | OUTPATIENT
Start: 2022-11-28

## 2022-11-28 RX ORDER — LEVOTHYROXINE SODIUM 88 UG/1
88 TABLET ORAL DAILY
Status: DISCONTINUED | OUTPATIENT
Start: 2022-11-29 | End: 2022-11-30 | Stop reason: HOSPADM

## 2022-11-28 RX ORDER — DORZOLAMIDE HCL 20 MG/ML
1 SOLUTION/ DROPS OPHTHALMIC 3 TIMES DAILY
Status: DISCONTINUED | OUTPATIENT
Start: 2022-11-28 | End: 2022-11-29

## 2022-11-28 RX ORDER — ASPIRIN 81 MG/1
81 TABLET ORAL DAILY
Status: DISCONTINUED | OUTPATIENT
Start: 2022-11-28 | End: 2022-11-30 | Stop reason: HOSPADM

## 2022-11-28 RX ORDER — BRIMONIDINE TARTRATE 2 MG/ML
1 SOLUTION/ DROPS OPHTHALMIC 2 TIMES DAILY
COMMUNITY

## 2022-11-28 RX ORDER — SILICONE ADHESIVE 1.5" X 3"
1 SHEET (EA) TOPICAL 4 TIMES DAILY
COMMUNITY

## 2022-11-28 RX ORDER — METOPROLOL TARTRATE 5 MG/5ML
5 INJECTION INTRAVENOUS ONCE
Status: DISCONTINUED | OUTPATIENT
Start: 2022-11-28 | End: 2022-11-28

## 2022-11-28 RX ORDER — ACETAMINOPHEN 500 MG
500 TABLET ORAL EVERY 6 HOURS PRN
Status: DISCONTINUED | OUTPATIENT
Start: 2022-11-28 | End: 2022-11-30 | Stop reason: HOSPADM

## 2022-11-28 RX ORDER — DILTIAZEM HYDROCHLORIDE 5 MG/ML
25 INJECTION INTRAVENOUS ONCE
Status: COMPLETED | OUTPATIENT
Start: 2022-11-28 | End: 2022-11-28

## 2022-11-28 RX ORDER — POTASSIUM CHLORIDE 7.45 MG/ML
10 INJECTION INTRAVENOUS ONCE
Status: COMPLETED | OUTPATIENT
Start: 2022-11-28 | End: 2022-11-28

## 2022-11-28 RX ORDER — METOPROLOL SUCCINATE 50 MG/1
50 TABLET, EXTENDED RELEASE ORAL DAILY
Status: DISCONTINUED | OUTPATIENT
Start: 2022-11-28 | End: 2022-11-28

## 2022-11-28 RX ORDER — ZINC GLUCONATE 50 MG
50 TABLET ORAL DAILY
COMMUNITY

## 2022-11-28 RX ORDER — OXYBUTYNIN CHLORIDE 5 MG/1
5 TABLET ORAL 2 TIMES DAILY
Status: DISCONTINUED | OUTPATIENT
Start: 2022-11-28 | End: 2022-11-30 | Stop reason: HOSPADM

## 2022-11-28 RX ORDER — CLOPIDOGREL BISULFATE 75 MG/1
75 TABLET ORAL DAILY
Status: DISCONTINUED | OUTPATIENT
Start: 2022-11-29 | End: 2022-11-30 | Stop reason: HOSPADM

## 2022-11-28 RX ORDER — ENOXAPARIN SODIUM 100 MG/ML
1 INJECTION SUBCUTANEOUS ONCE
Status: COMPLETED | OUTPATIENT
Start: 2022-11-28 | End: 2022-11-28

## 2022-11-28 RX ORDER — DEXAMETHASONE 6 MG/1
6 TABLET ORAL DAILY
Status: DISCONTINUED | OUTPATIENT
Start: 2022-11-29 | End: 2022-11-30 | Stop reason: HOSPADM

## 2022-11-28 RX ORDER — LATANOPROST 50 UG/ML
1 SOLUTION/ DROPS OPHTHALMIC NIGHTLY
COMMUNITY

## 2022-11-28 RX ORDER — ENOXAPARIN SODIUM 100 MG/ML
80 INJECTION SUBCUTANEOUS 2 TIMES DAILY
Status: DISCONTINUED | OUTPATIENT
Start: 2022-11-29 | End: 2022-11-30 | Stop reason: HOSPADM

## 2022-11-28 RX ORDER — DEXAMETHASONE SODIUM PHOSPHATE 10 MG/ML
6 INJECTION INTRAMUSCULAR; INTRAVENOUS ONCE
Status: COMPLETED | OUTPATIENT
Start: 2022-11-28 | End: 2022-11-28

## 2022-11-28 RX ORDER — ZINC GLUCONATE 50 MG
50 TABLET ORAL DAILY
Status: DISCONTINUED | OUTPATIENT
Start: 2022-11-28 | End: 2022-11-30 | Stop reason: HOSPADM

## 2022-11-28 RX ORDER — IPRATROPIUM BROMIDE AND ALBUTEROL SULFATE 2.5; .5 MG/3ML; MG/3ML
1 SOLUTION RESPIRATORY (INHALATION) ONCE
Status: COMPLETED | OUTPATIENT
Start: 2022-11-28 | End: 2022-11-28

## 2022-11-28 RX ORDER — FUROSEMIDE 10 MG/ML
20 INJECTION INTRAMUSCULAR; INTRAVENOUS DAILY
Status: DISCONTINUED | OUTPATIENT
Start: 2022-11-28 | End: 2022-11-30 | Stop reason: HOSPADM

## 2022-11-28 RX ORDER — SIMETHICONE 80 MG
80 TABLET,CHEWABLE ORAL
Status: DISCONTINUED | OUTPATIENT
Start: 2022-11-28 | End: 2022-11-29

## 2022-11-28 RX ORDER — CLONIDINE HYDROCHLORIDE 0.1 MG/1
0.1 TABLET ORAL 2 TIMES DAILY
Status: DISCONTINUED | OUTPATIENT
Start: 2022-11-28 | End: 2022-11-30 | Stop reason: HOSPADM

## 2022-11-28 RX ORDER — METOPROLOL TARTRATE 5 MG/5ML
INJECTION INTRAVENOUS
Status: DISCONTINUED
Start: 2022-11-28 | End: 2022-11-28 | Stop reason: ALTCHOICE

## 2022-11-28 RX ORDER — PAROXETINE HYDROCHLORIDE 20 MG/1
10 TABLET, FILM COATED ORAL DAILY
Status: DISCONTINUED | OUTPATIENT
Start: 2022-11-28 | End: 2022-11-29

## 2022-11-28 RX ORDER — LATANOPROST 50 UG/ML
1 SOLUTION/ DROPS OPHTHALMIC NIGHTLY
Status: DISCONTINUED | OUTPATIENT
Start: 2022-11-28 | End: 2022-11-30 | Stop reason: HOSPADM

## 2022-11-28 RX ORDER — BRIMONIDINE TARTRATE 2 MG/ML
1 SOLUTION/ DROPS OPHTHALMIC 3 TIMES DAILY
Status: DISCONTINUED | OUTPATIENT
Start: 2022-11-28 | End: 2022-11-29

## 2022-11-28 RX ORDER — POTASSIUM CHLORIDE 20 MEQ/1
20 TABLET, EXTENDED RELEASE ORAL 2 TIMES DAILY WITH MEALS
Status: DISCONTINUED | OUTPATIENT
Start: 2022-11-28 | End: 2022-11-30 | Stop reason: HOSPADM

## 2022-11-28 RX ORDER — ATORVASTATIN CALCIUM 10 MG/1
10 TABLET, FILM COATED ORAL DAILY
Status: DISCONTINUED | OUTPATIENT
Start: 2022-11-28 | End: 2022-11-30 | Stop reason: HOSPADM

## 2022-11-28 RX ORDER — POLYETHYLENE GLYCOL 3350 17 G/17G
17 POWDER, FOR SOLUTION ORAL DAILY PRN
Status: DISCONTINUED | OUTPATIENT
Start: 2022-11-28 | End: 2022-11-30 | Stop reason: HOSPADM

## 2022-11-28 RX ORDER — PANTOPRAZOLE SODIUM 40 MG/1
40 TABLET, DELAYED RELEASE ORAL
Status: DISCONTINUED | OUTPATIENT
Start: 2022-11-29 | End: 2022-11-30 | Stop reason: HOSPADM

## 2022-11-28 RX ORDER — ISOSORBIDE MONONITRATE 30 MG/1
30 TABLET, EXTENDED RELEASE ORAL DAILY
COMMUNITY

## 2022-11-28 RX ORDER — BUDESONIDE 0.5 MG/2ML
500 INHALANT ORAL 2 TIMES DAILY
Status: DISCONTINUED | OUTPATIENT
Start: 2022-11-28 | End: 2022-11-30 | Stop reason: HOSPADM

## 2022-11-28 RX ORDER — IPRATROPIUM BROMIDE AND ALBUTEROL SULFATE 2.5; .5 MG/3ML; MG/3ML
1 SOLUTION RESPIRATORY (INHALATION) 4 TIMES DAILY
Status: DISCONTINUED | OUTPATIENT
Start: 2022-11-28 | End: 2022-11-30 | Stop reason: HOSPADM

## 2022-11-28 RX ORDER — NITROGLYCERIN 80 MG/1
1 PATCH TRANSDERMAL DAILY
Status: DISCONTINUED | OUTPATIENT
Start: 2022-11-28 | End: 2022-11-29

## 2022-11-28 RX ORDER — IPRATROPIUM BROMIDE AND ALBUTEROL SULFATE 2.5; .5 MG/3ML; MG/3ML
1 SOLUTION RESPIRATORY (INHALATION) EVERY 4 HOURS
Status: DISCONTINUED | OUTPATIENT
Start: 2022-11-28 | End: 2022-11-28

## 2022-11-28 RX ORDER — POTASSIUM CITRATE 10 MEQ/1
TABLET, EXTENDED RELEASE ORAL DAILY
COMMUNITY

## 2022-11-28 RX ORDER — IPRATROPIUM BROMIDE AND ALBUTEROL SULFATE 2.5; .5 MG/3ML; MG/3ML
1 SOLUTION RESPIRATORY (INHALATION) EVERY 4 HOURS PRN
Status: DISCONTINUED | OUTPATIENT
Start: 2022-11-28 | End: 2022-11-28

## 2022-11-28 RX ORDER — OLOPATADINE HYDROCHLORIDE 2 MG/ML
1 SOLUTION/ DROPS OPHTHALMIC DAILY
COMMUNITY

## 2022-11-28 RX ORDER — ASCORBIC ACID 500 MG
1000 TABLET ORAL DAILY
Status: DISCONTINUED | OUTPATIENT
Start: 2022-11-28 | End: 2022-11-30 | Stop reason: HOSPADM

## 2022-11-28 RX ORDER — ATENOLOL 50 MG/1
50 TABLET ORAL
Status: COMPLETED | OUTPATIENT
Start: 2022-11-28 | End: 2022-11-28

## 2022-11-28 RX ORDER — POTASSIUM CHLORIDE 20 MEQ/1
20 TABLET, EXTENDED RELEASE ORAL ONCE
Status: COMPLETED | OUTPATIENT
Start: 2022-11-28 | End: 2022-11-28

## 2022-11-28 RX ORDER — NITROGLYCERIN 0.4 MG/1
0.4 TABLET SUBLINGUAL EVERY 5 MIN PRN
Status: DISCONTINUED | OUTPATIENT
Start: 2022-11-28 | End: 2022-11-30 | Stop reason: HOSPADM

## 2022-11-28 RX ORDER — LANOLIN ALCOHOL/MO/W.PET/CERES
50 CREAM (GRAM) TOPICAL DAILY
Status: DISCONTINUED | OUTPATIENT
Start: 2022-11-28 | End: 2022-11-30 | Stop reason: HOSPADM

## 2022-11-28 RX ORDER — IPRATROPIUM BROMIDE AND ALBUTEROL SULFATE 2.5; .5 MG/3ML; MG/3ML
1 SOLUTION RESPIRATORY (INHALATION)
Status: DISCONTINUED | OUTPATIENT
Start: 2022-11-28 | End: 2022-11-28

## 2022-11-28 RX ORDER — 0.9 % SODIUM CHLORIDE 0.9 %
500 INTRAVENOUS SOLUTION INTRAVENOUS ONCE
Status: COMPLETED | OUTPATIENT
Start: 2022-11-28 | End: 2022-11-28

## 2022-11-28 RX ORDER — DORZOLAMIDE HCL 20 MG/ML
1 SOLUTION/ DROPS OPHTHALMIC 2 TIMES DAILY
COMMUNITY

## 2022-11-28 RX ADMIN — BUDESONIDE 500 MCG: 0.5 SUSPENSION RESPIRATORY (INHALATION) at 19:58

## 2022-11-28 RX ADMIN — POTASSIUM CHLORIDE 20 MEQ: 1500 TABLET, EXTENDED RELEASE ORAL at 18:48

## 2022-11-28 RX ADMIN — POTASSIUM CHLORIDE 10 MEQ: 7.46 INJECTION, SOLUTION INTRAVENOUS at 14:21

## 2022-11-28 RX ADMIN — IPRATROPIUM BROMIDE AND ALBUTEROL SULFATE 1 AMPULE: .5; 2.5 SOLUTION RESPIRATORY (INHALATION) at 19:58

## 2022-11-28 RX ADMIN — IPRATROPIUM BROMIDE AND ALBUTEROL SULFATE 1 AMPULE: .5; 2.5 SOLUTION RESPIRATORY (INHALATION) at 16:49

## 2022-11-28 RX ADMIN — ATENOLOL 50 MG: 50 TABLET ORAL at 18:49

## 2022-11-28 RX ADMIN — SODIUM CHLORIDE 500 ML: 9 INJECTION, SOLUTION INTRAVENOUS at 14:23

## 2022-11-28 RX ADMIN — POTASSIUM CHLORIDE 20 MEQ: 1500 TABLET, EXTENDED RELEASE ORAL at 22:56

## 2022-11-28 RX ADMIN — OXYCODONE HYDROCHLORIDE AND ACETAMINOPHEN 1000 MG: 500 TABLET ORAL at 18:48

## 2022-11-28 RX ADMIN — Medication 50 MG: at 18:48

## 2022-11-28 RX ADMIN — DILTIAZEM HYDROCHLORIDE 5 MG/HR: 5 INJECTION INTRAVENOUS at 13:17

## 2022-11-28 RX ADMIN — IPRATROPIUM BROMIDE AND ALBUTEROL SULFATE 1 AMPULE: .5; 3 SOLUTION RESPIRATORY (INHALATION) at 13:26

## 2022-11-28 RX ADMIN — DILTIAZEM HYDROCHLORIDE 25 MG: 5 INJECTION, SOLUTION INTRAVENOUS at 12:27

## 2022-11-28 RX ADMIN — DILTIAZEM HYDROCHLORIDE 25 MG: 5 INJECTION, SOLUTION INTRAVENOUS at 13:13

## 2022-11-28 RX ADMIN — ENOXAPARIN SODIUM 160 MG: 100 INJECTION SUBCUTANEOUS at 13:42

## 2022-11-28 RX ADMIN — PYRIDOXINE HCL TAB 50 MG 50 MG: 50 TAB at 18:48

## 2022-11-28 RX ADMIN — DEXAMETHASONE SODIUM PHOSPHATE 6 MG: 10 INJECTION INTRAMUSCULAR; INTRAVENOUS at 13:46

## 2022-11-28 RX ADMIN — ASPIRIN 81 MG: 81 TABLET, COATED ORAL at 18:48

## 2022-11-28 ASSESSMENT — ENCOUNTER SYMPTOMS
ABDOMINAL PAIN: 1
EYE REDNESS: 0
CHOKING: 0
SHORTNESS OF BREATH: 1
COUGH: 1
RHINORRHEA: 0
VOMITING: 0
EYE PAIN: 0
DIARRHEA: 1
SINUS PRESSURE: 0
SINUS PAIN: 0
CHEST TIGHTNESS: 0
SORE THROAT: 1
CONSTIPATION: 0
NAUSEA: 0
WHEEZING: 1

## 2022-11-28 ASSESSMENT — PAIN - FUNCTIONAL ASSESSMENT: PAIN_FUNCTIONAL_ASSESSMENT: NONE - DENIES PAIN

## 2022-11-28 NOTE — ED NOTES
Pt covered in stool.  PT cleaned of stool and new linens provided     Otto Kaplano, RN  11/28/22 4454

## 2022-11-28 NOTE — ACP (ADVANCE CARE PLANNING)
Advance Care Planning     Advance Care Planning Activator (Inpatient)  Conversation Note      Date of ACP Conversation: 11/28/2022     Conversation Conducted with: Patient with Decision Making Capacity    ACP Activator: Bran Rose:     Current Designated Health Care Decision Maker:     Primary Decision Maker: Mirna Edmonds - [phone]    Secondary Decision Maker: María Elena Cardona - Jr - [phone]  Click here to complete Healthcare Decision Makers including section of the Healthcare Decision Maker Relationship (ie \"Primary\")  Today we documented Decision Maker(s) consistent with Legal Next of Kin hierarchy. Care Preferences    Ventilation: \"If you were in your present state of health and suddenly became very ill and were unable to breathe on your own, what would your preference be about the use of a ventilator (breathing machine) if it were available to you? \"      Would the patient desire the use of ventilator (breathing machine)?:  Undecided    \"If your health worsens and it becomes clear that your chance of recovery is unlikely, what would your preference be about the use of a ventilator (breathing machine) if it were available to you? \"     Would the patient desire the use of ventilator (breathing machine)?: Undecided      Resuscitation  \"CPR works best to restart the heart when there is a sudden event, like a heart attack, in someone who is otherwise healthy. Unfortunately, CPR does not typically restart the heart for people who have serious health conditions or who are very sick. \"    \"In the event your heart stopped as a result of an underlying serious health condition, would you want attempts to be made to restart your heart (answer \"yes\" for attempt to resuscitate) or would you prefer a natural death (answer \"no\" for do not attempt to resuscitate)? \" yes       [] Yes   [x] No   Educated Patient / Darreld Bosworth regarding differences between Advance Directives and portable DNR orders.     Length of ACP Conversation in minutes:  13    Conversation Outcomes:  [] ACP discussion completed  [] Existing advance directive reviewed with patient; no changes to patient's previously recorded wishes  [] New Advance Directive completed  [] Portable Do Not Rescitate prepared for Provider review and signature  [] POLST/POST/MOLST/MOST prepared for Provider review and signature      Follow-up plan:    [] Schedule follow-up conversation to continue planning  [x] Referred individual to Provider for additional questions/concerns   [] Advised patient/agent/surrogate to review completed ACP document and update if needed with changes in condition, patient preferences or care setting    [x] This note routed to one or more involved healthcare providers
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
## 2022-11-28 NOTE — H&P
Department of Internal Medicine        CHIEF COMPLAINT: 3-day history of cold-like symptoms, diarrhea, abdominal pain    Reason for Admission: New onset atrial fibrillation with RVR, positive COVID    HISTORY OF PRESENT ILLNESS:      The patient is a 80 y.o. female who presents with cold-like symptoms for the last 3 days. Patient has a moist white productive cough. Patient's also had some associated diarrhea. She denies any chest pain, palpitation. She has some mild shortness of breath. Patient has not followed up with this because apparently her  has refused her to take her anywhere to check out her symptoms.  has a history of a stroke and heart attack and since then has had a personality change and has not been as caring or nice to the patient. Patient was brought into the hospital where is noted she was in atrial fibrillation with RVR with no history of this. Patient's troponin was essentially unremarkable with a BUN/creatinine of 16/0.8. Potassium 3.3. WBC 6.5 with hemoglobin 14.3. Patient was tested for COVID which was positive. Patient's heart rate has been very tachycardic in the ED. Temperature was 98.5 with blood pressure which has been mildly elevated or high normal.  Patient was put on Cardizem drip in the ED. Chest x-ray suspected mild pulmonary congestion with cardiomegaly. Case discussed with patient's  at the bedside.     Past Medical History:    Past Medical History:   Diagnosis Date    Cataract     right eye    Cholecystitis with cholelithiasis     Diverticulitis     Diverticulosis     DJD (degenerative joint disease)     DVT (deep venous thrombosis) (HCC)     RLE    Fibroids     Glaucoma     HTN (hypertension)     Hyperlipidemia     states on med for precaution    Morbid obesity ([address]     GLENNA on CPAP     Osteoarthritis     PE (pulmonary embolism)     Renal cyst     right     Past Surgical History:    Past Surgical History:   Procedure Laterality Date    CATARACT EXTRACTION W/  INTRAOCULAR LENS IMPLANT  12/2011    left eye    CHOLECYSTECTOMY, [address]  jun 2012    cystoscopy, right ureteroscopy with retrograde pyelograms, stone manipulation, stent insertion, laser lithotripsy with holmium laser    HYSTERECTOMY      [place] GLAUCOMA SURG,TRABECU AB EXTERNO Right 11/15/2018    RIGHT EYE REVISION OF XEN EYE STENT, TRABECULECTOMY performed by Denise Alonzo MD at [address] [place] [address]    for RLE DVT       Medications Prior to Admission:    @  Prior to Admission medications    Medication Sig Start Date End Date Taking? Authorizing Provider   levothyroxine (SYNTHROID) 88 MCG tablet Take 1 tablet by mouth Daily 4/24/19   Leonrad Young, DO   pantoprazole (PROTONIX) 40 MG tablet Take 1 tablet by mouth every morning (before breakfast) 4/24/19   Leonard Young, DO   oxybutynin (DITROPAN) 5 MG tablet Take 1 tablet by mouth 2 times daily 4/23/19   Leonard Young, DO   simethicone (MYLICON) 80 MG chewable tablet Take 1 tablet by mouth 4 times daily (before meals and nightly) 4/23/19   Leonard Young, DO   hydrocortisone 2.5 % cream Apply topically 2 times daily.  4/23/19   Leonard Young, DO   polyethyl glycol-propyl glycol 0.4-0.3 % (SYSTANE) 0.4-0.3 % ophthalmic solution 1 drop as needed for Dry Eyes    Historical Provider, MD   brinzolamide-brimonidine 1-0.2 % SUSP Apply 1 drop to eye 2 times daily Left eye    Historical Provider, MD   bimatoprost (LUMIGAN) 0.01 % SOLN ophthalmic drops Place 1 drop into the left eye nightly    Historical Provider, MD   Psyllium (METAMUCIL FIBER PO) Take by mouth 2 times daily    Historical Provider, MD   aspirin 81 MG EC tablet Take 81 mg by mouth daily Pt instructed to check hold with dr. Gurpreet Rodríguez Provider, MD   [facility] OIL Take 1,000 mg by mouth daily Ld 11/9/2018    Historical Provider, MD   Docusate Sodium (DOCULASE PO) Take 1 tablet by mouth as needed. Historical Provider, MD   amlodipine (NORVASC) 2.5 MG tablet Take 1 tablet by mouth daily. Patient taking differently: Take 5 mg by mouth Daily with lunch  12   Cyrilla Laxmi, DO   metoprolol (TOPROL-XL) 50 MG XL tablet Take 1 tablet by mouth daily. 12   Cyrilla Laxmi, DO   nitroGLYCERIN (NITRODUR) 0.4 MG/HR Place 1 patch onto the skin daily. 12   Cyrilla Laxmi, DO   clonidine (CATAPRES) 0.3 MG/24HR Place 1 patch onto the skin once a week Indications: saturday     Historical Provider, MD   atorvastatin (LIPITOR) 10 MG tablet Take 10 mg by mouth daily. Historical Provider, MD   clopidogrel (PLAVIX) 75 MG tablet Take 75 mg by mouth daily Pt instructed to check hold with dr. Anabella Ragland Provider, MD   nitroGLYCERIN (NITROSTAT) 0.4 MG SL tablet Place 0.4 mg under the tongue every 5 minutes as needed.       Historical Provider, MD   paroxetine (PAXIL-CR) 12.5 MG CR tablet Take 12.5 mg by mouth every morning Instructed to take am of procedure    Historical Provider, MD       Allergies:  Pcn [penicillins] and Noroxin [norfloxacin]    Social History:   Social History     Socioeconomic History    Marital status:      Spouse name: Not on file    Number of children: Not on file    Years of education: Not on file    Highest education level: Not on file   Occupational History    Occupation: parminder     Comment: retired    Tobacco Use    Smoking status: Former     Packs/day: 1.00     Years: 30.00     Pack years: 30.00     Types: Cigarettes     Quit date: 1991     Years since quittin.5    Smokeless tobacco: Never   Vaping Use    Vaping Use: Never used   Substance and Sexual Activity    Alcohol use: No    Drug use: No    Sexual activity: Not on file   Other Topics Concern    Not on file   Social History Narrative    Not on file     Social Determinants of Health     Financial Resource Strain: Not on file   Food Insecurity: Not on file   Transportation Needs: Not on file Physical Activity: Not on file   Stress: Not on file   Social Connections: Not on file   Intimate Partner Violence: Not on file   Housing Stability: Not on file       Family History:   Family History   Problem Relation Age of Onset    Cancer Brother          - lung cancer    COPD Father     Coronary Art Dis Father     Heart Attack Mother 62        sudden death       REVIEW OF SYSTEMS:    Gen: Patient denies any lightheadedness or dizziness. No LOC or syncope. No fevers or chills. HEENT: No earache,+ sore throat, nasal congestion. Resp: + Moist white productive cough, no hemoptysis  Cardiac: Denies chest pain,+ SOB, no diaphoresis or palpitations. GI: No nausea, vomiting, +diarrhea. No melena or hematochezia. : No urinary complaints, dysuria, hematuria or frequency. MSK: No extremity weakness, paralysis or paresthesias. PHYSICAL EXAM:    Vitals:  BP (!) 134/98   Pulse (!) 135   Temp 98.5 °F (36.9 °C) (Oral)   Resp 24   Wt (!) 350 lb (158.8 kg)   SpO2 96%   BMI 60.08 kg/m²     General:  This is a 80 y.o. yo female who is alert and oriented in moderate distress secondary to above  HEENT:  Head is normocephalic and atraumatic, PERRLA, EOMI, mucus membranes moist with no pharyngeal erythema or exudate. Neck:  Supple with no carotid bruits, JVD or thyromegaly.   No cervical adenopathy  CV:  Irregular rate and rhythm, distant heart sounds, no murmurs  Lungs: Coarse breath sounds with scattered rhonchi with mild expiratory wheeze to auscultation bilaterally   Abdomen:  Soft, nontender, + obese, nondistended, bowel sounds present  Extremities:  + Nonpitting and trace pitting lower leg edema, peripheral pulses intact bilaterally  Neuro:  Cranial nerves II-XII grossly intact; motor and sensory function intact with no focal deficits  Skin:  No rashes, lesions or wounds    DATA:  CBC with Differential:    Lab Results   Component Value Date/Time    WBC 6.5 2022 11:22 AM    RBC 4.78 11/28/2022 11:22 AM    HGB 14.3 11/28/2022 11:22 AM    HCT 42.4 11/28/2022 11:22 AM     11/28/2022 11:22 AM    MCV 88.7 11/28/2022 11:22 AM    MCH 29.9 11/28/2022 11:22 AM    MCHC 33.7 11/28/2022 11:22 AM    RDW 14.2 11/28/2022 11:22 AM    SEGSPCT 55 05/24/2012 02:10 PM    LYMPHOPCT 12.9 11/28/2022 11:22 AM    MONOPCT 16.5 11/28/2022 11:22 AM    MYELOPCT 1 03/27/2012 12:50 PM    BASOPCT 0.2 11/28/2022 11:22 AM    MONOSABS 1.07 11/28/2022 11:22 AM    LYMPHSABS 0.84 11/28/2022 11:22 AM    EOSABS 0.04 11/28/2022 11:22 AM    BASOSABS 0.01 11/28/2022 11:22 AM     CMP:    Lab Results   Component Value Date/Time     11/28/2022 11:22 AM    K 3.3 11/28/2022 11:22 AM     11/28/2022 11:22 AM    CO2 25 11/28/2022 11:22 AM    BUN 16 11/28/2022 11:22 AM    CREATININE 0.8 11/28/2022 11:22 AM    GFRAA >60 04/23/2019 06:15 AM    LABGLOM >60 11/28/2022 11:22 AM    GLUCOSE 118 11/28/2022 11:22 AM    GLUCOSE 105 05/24/2012 02:10 PM    PROT 5.8 04/23/2019 06:15 AM    LABALBU 3.6 04/23/2019 06:15 AM    LABALBU 3.9 05/24/2012 02:10 PM    CALCIUM 9.2 11/28/2022 11:22 AM    BILITOT 1.1 04/23/2019 06:15 AM    ALKPHOS 88 04/23/2019 06:15 AM    AST 18 04/23/2019 06:15 AM    ALT 11 04/23/2019 06:15 AM     Magnesium:    Lab Results   Component Value Date/Time    MG 1.8 04/12/2019 04:20 PM     Phosphorus:    Lab Results   Component Value Date/Time    PHOS 4.2 05/10/2012 03:23 AM     PT/INR:    Lab Results   Component Value Date/Time    PROTIME 16.4 05/11/2012 07:26 AM    INR 1.7 05/11/2012 07:26 AM     Troponin:    Lab Results   Component Value Date/Time    TROPONINI <0.01 04/12/2019 04:20 PM     U/A:    Lab Results   Component Value Date/Time    COLORU DKYELLOW 04/13/2019 10:37 PM    PROTEINU TRACE 04/13/2019 10:37 PM    PHUR 6.5 04/13/2019 10:37 PM    WBCUA >20 04/13/2019 10:37 PM    WBCUA 10-20 05/26/2012 11:50 AM    RBCUA 0-1 04/13/2019 10:37 PM    RBCUA >20 05/26/2012 11:50 AM    YEAST RARE 04/13/2019 10:37 PM    BACTERIA RARE 04/13/2019 10:37 PM    CLARITYU SLCLOUDY 04/13/2019 10:37 PM    SPECGRAV <=1.005 04/13/2019 10:37 PM    LEUKOCYTESUR LARGE 04/13/2019 10:37 PM    UROBILINOGEN 1.0 04/13/2019 10:37 PM    BILIRUBINUR Negative 04/13/2019 10:37 PM    BILIRUBINUR NEGATIVE 05/26/2012 11:50 AM    BLOODU TRACE 04/13/2019 10:37 PM    GLUCOSEU Negative 04/13/2019 10:37 PM    GLUCOSEU NEGATIVE 05/26/2012 11:50 AM     ABG:  No results found for: PH, PCO2, PO2, HCO3, BE, THGB, TCO2, O2SAT  HgBA1c:  No results found for: LABA1C  FLP:  No results found for: TRIG, HDL, LDLCALC, LDLDIRECT, LABVLDL  TSH:    Lab Results   Component Value Date/Time    TSH 1.990 04/13/2019 07:50 AM     IRON:  No results found for: IRON  LIPASE:    Lab Results   Component Value Date/Time    LIPASE 23 04/12/2019 04:20 PM       ASSESSMENT AND PLAN:      Patient Active Problem List    Diagnosis Date Noted    Atrial fibrillation with rapid ventricular response ([facility] [address] 11/28/2022    Moderate protein-calorie malnutrition ([address] 04/13/2019    Pneumonia 04/12/2019    Acute renal failure (ARF) ([address] 05/09/2012    Pyelonephritis 05/09/2012    Obstructive uropathy 05/09/2012    Kidney stone 05/09/2012    HTN (hypertension)     Hyperlipidemia     Obstructive sleep apnea     Glaucoma     DJD (degenerative joint disease)     Morbid obesity ([address]      Impression:  1. New onset atrial fibrillation with RVR  2. CHF per chest x-ray which probably related to atrial fib with RVR  3. Positive COVID-19 infection-CRP only 2.5  4. Acute hypoxic respiratory failure-most likely related to #2 and 3, 6  5. History hypertension  6. History of GLENNA  7. Morbid obesity  8. History DVT-right lower extremity with vena caval filter placement 1999  9. History of hyperlipidemia  10.   Diarrhea-possibly related to COVID    Plan:  Admit to [facility] medication reviewed  Metoprolol 5 mg IV push x1 now in ED to evaluate response  Increase oral metoprolol 50 mg daily XL and adjust-consider switching to atenolol  Monitor heart rate, blood pressure, O2 saturations  General diet with no added salt and fluid restriction  Accurate I's and O's  PT/OT  Contact and respiratory isolation  O2 nasal cannula as needed to keep saturation greater than 92-96%  DuoNeb aerosol  Pulmicort aerosol  Decadron 6 mg p.o. daily  Lasix 20 mg IV push daily  KCl 20 mg twice daily  Echocardiogram  Cardizem drip and transition to beta-blockers  D-dimer  Vitamin C, vitamin D, zinc, vitamin B1  Lovenox 80 mg SQ twice daily and transition to Eliquis  Consult cardiology    CMP, CBC in a.m.       Patti Zavala DO, D.ODereje  11/28/2022  4:54 PM

## 2022-11-29 ENCOUNTER — APPOINTMENT (OUTPATIENT)
Dept: CT IMAGING | Age: 82
DRG: 177 | End: 2022-11-29
Payer: MEDICARE

## 2022-11-29 LAB
ALBUMIN SERPL-MCNC: 4 G/DL (ref 3.5–5.2)
ALP BLD-CCNC: 115 U/L (ref 35–104)
ALT SERPL-CCNC: 12 U/L (ref 0–32)
ANION GAP SERPL CALCULATED.3IONS-SCNC: 16 MMOL/L (ref 7–16)
AST SERPL-CCNC: 33 U/L (ref 0–31)
BASOPHILS ABSOLUTE: 0 E9/L (ref 0–0.2)
BASOPHILS RELATIVE PERCENT: 0 % (ref 0–2)
BILIRUB SERPL-MCNC: 1.1 MG/DL (ref 0–1.2)
BUN BLDV-MCNC: 18 MG/DL (ref 6–23)
C-REACTIVE PROTEIN, HIGH SENSITIVITY: 15.6 MG/L (ref 0–3)
CALCIUM SERPL-MCNC: 9.5 MG/DL (ref 8.6–10.2)
CHLORIDE BLD-SCNC: 104 MMOL/L (ref 98–107)
CHOLESTEROL, TOTAL: 133 MG/DL (ref 0–199)
CO2: 21 MMOL/L (ref 22–29)
CREAT SERPL-MCNC: 1 MG/DL (ref 0.5–1)
D DIMER: 207 NG/ML DDU
EKG ATRIAL RATE: 111 BPM
EKG Q-T INTERVAL: 322 MS
EKG QRS DURATION: 78 MS
EKG QTC CALCULATION (BAZETT): 464 MS
EKG R AXIS: 12 DEGREES
EKG T AXIS: 168 DEGREES
EKG VENTRICULAR RATE: 125 BPM
EOSINOPHILS ABSOLUTE: 0 E9/L (ref 0.05–0.5)
EOSINOPHILS RELATIVE PERCENT: 0 % (ref 0–6)
FERRITIN: 210 NG/ML
GFR SERPL CREATININE-BSD FRML MDRD: 56 ML/MIN/1.73
GLUCOSE BLD-MCNC: 117 MG/DL (ref 74–99)
HCT VFR BLD CALC: 43.1 % (ref 34–48)
HDLC SERPL-MCNC: 52 MG/DL
HEMOGLOBIN: 14.2 G/DL (ref 11.5–15.5)
IMMATURE GRANULOCYTES #: 0.02 E9/L
IMMATURE GRANULOCYTES %: 0.4 % (ref 0–5)
LDL CHOLESTEROL CALCULATED: 61 MG/DL (ref 0–99)
LYMPHOCYTES ABSOLUTE: 0.84 E9/L (ref 1.5–4)
LYMPHOCYTES RELATIVE PERCENT: 17.3 % (ref 20–42)
MAGNESIUM: 1.9 MG/DL (ref 1.6–2.6)
MCH RBC QN AUTO: 29.4 PG (ref 26–35)
MCHC RBC AUTO-ENTMCNC: 32.9 % (ref 32–34.5)
MCV RBC AUTO: 89.2 FL (ref 80–99.9)
MONOCYTES ABSOLUTE: 0.61 E9/L (ref 0.1–0.95)
MONOCYTES RELATIVE PERCENT: 12.6 % (ref 2–12)
NEUTROPHILS ABSOLUTE: 3.39 E9/L (ref 1.8–7.3)
NEUTROPHILS RELATIVE PERCENT: 69.7 % (ref 43–80)
PDW BLD-RTO: 14.1 FL (ref 11.5–15)
PHOSPHORUS: 3 MG/DL (ref 2.5–4.5)
PLATELET # BLD: 235 E9/L (ref 130–450)
PMV BLD AUTO: 9.7 FL (ref 7–12)
POTASSIUM SERPL-SCNC: 4 MMOL/L (ref 3.5–5)
PROCALCITONIN: 0.08 NG/ML (ref 0–0.08)
RBC # BLD: 4.83 E12/L (ref 3.5–5.5)
SODIUM BLD-SCNC: 141 MMOL/L (ref 132–146)
TOTAL PROTEIN: 7 G/DL (ref 6.4–8.3)
TRIGL SERPL-MCNC: 101 MG/DL (ref 0–149)
TSH SERPL DL<=0.05 MIU/L-ACNC: 1.17 UIU/ML (ref 0.27–4.2)
VLDLC SERPL CALC-MCNC: 20 MG/DL
WBC # BLD: 4.9 E9/L (ref 4.5–11.5)

## 2022-11-29 PROCEDURE — 71250 CT THORAX DX C-: CPT

## 2022-11-29 PROCEDURE — 6370000000 HC RX 637 (ALT 250 FOR IP): Performed by: INTERNAL MEDICINE

## 2022-11-29 PROCEDURE — 2060000000 HC ICU INTERMEDIATE R&B

## 2022-11-29 PROCEDURE — 83735 ASSAY OF MAGNESIUM: CPT

## 2022-11-29 PROCEDURE — 6360000002 HC RX W HCPCS: Performed by: INTERNAL MEDICINE

## 2022-11-29 PROCEDURE — 93306 TTE W/DOPPLER COMPLETE: CPT

## 2022-11-29 PROCEDURE — 85378 FIBRIN DEGRADE SEMIQUANT: CPT

## 2022-11-29 PROCEDURE — 80053 COMPREHEN METABOLIC PANEL: CPT

## 2022-11-29 PROCEDURE — 85025 COMPLETE CBC W/AUTO DIFF WBC: CPT

## 2022-11-29 PROCEDURE — 97161 PT EVAL LOW COMPLEX 20 MIN: CPT | Performed by: PHYSICAL THERAPIST

## 2022-11-29 PROCEDURE — 84443 ASSAY THYROID STIM HORMONE: CPT

## 2022-11-29 PROCEDURE — 86141 C-REACTIVE PROTEIN HS: CPT

## 2022-11-29 PROCEDURE — 93010 ELECTROCARDIOGRAM REPORT: CPT | Performed by: INTERNAL MEDICINE

## 2022-11-29 PROCEDURE — 94640 AIRWAY INHALATION TREATMENT: CPT

## 2022-11-29 PROCEDURE — 97530 THERAPEUTIC ACTIVITIES: CPT | Performed by: PHYSICAL THERAPIST

## 2022-11-29 PROCEDURE — 80061 LIPID PANEL: CPT

## 2022-11-29 PROCEDURE — 84100 ASSAY OF PHOSPHORUS: CPT

## 2022-11-29 PROCEDURE — 84145 PROCALCITONIN (PCT): CPT

## 2022-11-29 PROCEDURE — 36415 COLL VENOUS BLD VENIPUNCTURE: CPT

## 2022-11-29 RX ORDER — DORZOLAMIDE HCL 20 MG/ML
1 SOLUTION/ DROPS OPHTHALMIC 2 TIMES DAILY
Status: DISCONTINUED | OUTPATIENT
Start: 2022-11-29 | End: 2022-11-30 | Stop reason: HOSPADM

## 2022-11-29 RX ORDER — BRIMONIDINE TARTRATE 2 MG/ML
1 SOLUTION/ DROPS OPHTHALMIC 2 TIMES DAILY
Status: DISCONTINUED | OUTPATIENT
Start: 2022-11-29 | End: 2022-11-30 | Stop reason: HOSPADM

## 2022-11-29 RX ORDER — ATENOLOL 25 MG/1
25 TABLET ORAL DAILY
Status: DISCONTINUED | OUTPATIENT
Start: 2022-11-29 | End: 2022-11-30 | Stop reason: HOSPADM

## 2022-11-29 RX ADMIN — ENOXAPARIN SODIUM 80 MG: 100 INJECTION SUBCUTANEOUS at 21:49

## 2022-11-29 RX ADMIN — Medication 50 MG: at 08:06

## 2022-11-29 RX ADMIN — OXYBUTYNIN CHLORIDE 5 MG: 5 TABLET ORAL at 08:06

## 2022-11-29 RX ADMIN — OXYCODONE HYDROCHLORIDE AND ACETAMINOPHEN 1000 MG: 500 TABLET ORAL at 08:06

## 2022-11-29 RX ADMIN — BUDESONIDE 500 MCG: 0.5 SUSPENSION RESPIRATORY (INHALATION) at 06:39

## 2022-11-29 RX ADMIN — CLONIDINE HYDROCHLORIDE 0.1 MG: 0.1 TABLET ORAL at 21:49

## 2022-11-29 RX ADMIN — CLOPIDOGREL BISULFATE 75 MG: 75 TABLET ORAL at 08:07

## 2022-11-29 RX ADMIN — PYRIDOXINE HCL TAB 50 MG 50 MG: 50 TAB at 08:06

## 2022-11-29 RX ADMIN — FUROSEMIDE 20 MG: 10 INJECTION, SOLUTION INTRAMUSCULAR; INTRAVENOUS at 08:06

## 2022-11-29 RX ADMIN — IPRATROPIUM BROMIDE AND ALBUTEROL SULFATE 1 AMPULE: .5; 2.5 SOLUTION RESPIRATORY (INHALATION) at 10:25

## 2022-11-29 RX ADMIN — CARBOXYMETHYLCELLULOSE SODIUM, GLYCERIN, AND POLYSORBATE 80 1 DROP: 5; 10; 5 SOLUTION/ DROPS OPHTHALMIC at 21:49

## 2022-11-29 RX ADMIN — POTASSIUM CHLORIDE 20 MEQ: 1500 TABLET, EXTENDED RELEASE ORAL at 17:09

## 2022-11-29 RX ADMIN — ENOXAPARIN SODIUM 80 MG: 100 INJECTION SUBCUTANEOUS at 08:05

## 2022-11-29 RX ADMIN — LATANOPROST 1 DROP: 50 SOLUTION OPHTHALMIC at 21:48

## 2022-11-29 RX ADMIN — BRIMONIDINE TARTRATE 1 DROP: 2 SOLUTION OPHTHALMIC at 21:48

## 2022-11-29 RX ADMIN — CLONIDINE HYDROCHLORIDE 0.1 MG: 0.1 TABLET ORAL at 08:06

## 2022-11-29 RX ADMIN — IPRATROPIUM BROMIDE AND ALBUTEROL SULFATE 1 AMPULE: .5; 2.5 SOLUTION RESPIRATORY (INHALATION) at 06:40

## 2022-11-29 RX ADMIN — BARICITINIB 4 MG: 2 TABLET, FILM COATED ORAL at 08:05

## 2022-11-29 RX ADMIN — ASPIRIN 81 MG: 81 TABLET, COATED ORAL at 08:07

## 2022-11-29 RX ADMIN — DORZOLAMIDE HYDROCHLORIDE 1 DROP: 20 SOLUTION/ DROPS OPHTHALMIC at 21:48

## 2022-11-29 RX ADMIN — LEVOTHYROXINE SODIUM 88 MCG: 0.09 TABLET ORAL at 06:02

## 2022-11-29 RX ADMIN — IPRATROPIUM BROMIDE AND ALBUTEROL SULFATE 1 AMPULE: .5; 2.5 SOLUTION RESPIRATORY (INHALATION) at 14:27

## 2022-11-29 RX ADMIN — BARICITINIB 4 MG: 2 TABLET, FILM COATED ORAL at 00:05

## 2022-11-29 RX ADMIN — ATORVASTATIN CALCIUM 10 MG: 10 TABLET, FILM COATED ORAL at 21:49

## 2022-11-29 RX ADMIN — POTASSIUM CHLORIDE 20 MEQ: 1500 TABLET, EXTENDED RELEASE ORAL at 08:06

## 2022-11-29 RX ADMIN — PANTOPRAZOLE SODIUM 40 MG: 40 TABLET, DELAYED RELEASE ORAL at 06:02

## 2022-11-29 RX ADMIN — OXYBUTYNIN CHLORIDE 5 MG: 5 TABLET ORAL at 21:49

## 2022-11-29 RX ADMIN — DEXAMETHASONE 6 MG: 6 TABLET ORAL at 08:06

## 2022-11-29 RX ADMIN — ATENOLOL 25 MG: 25 TABLET ORAL at 17:08

## 2022-11-29 NOTE — PROGRESS NOTES
Department of Internal Medicine        CHIEF COMPLAINT: 3-day history of cold-like symptoms, diarrhea, abdominal pain    Reason for Admission: New onset atrial fibrillation with RVR, positive COVID    HISTORY OF PRESENT ILLNESS:      The patient is a 80 y.o. female who presents with cold-like symptoms for the last 3 days. Patient has a moist white productive cough. Patient's also had some associated diarrhea. She denies any chest pain, palpitation. She has some mild shortness of breath. Patient has not followed up with this because apparently her  has refused her to take her anywhere to check out her symptoms.  has a history of a stroke and heart attack and since then has had a personality change and has not been as caring or nice to the patient. Patient was brought into the hospital where is noted she was in atrial fibrillation with RVR with no history of this. Patient's troponin was essentially unremarkable with a BUN/creatinine of 16/0.8. Potassium 3.3. WBC 6.5 with hemoglobin 14.3. Patient was tested for COVID which was positive. Patient's heart rate has been very tachycardic in the ED. Temperature was 98.5 with blood pressure which has been mildly elevated or high normal.  Patient was put on Cardizem drip in the ED. Chest x-ray suspected mild pulmonary congestion with cardiomegaly. Case discussed with patient's  at the bedside. 11/29/2022  Patient seen examined on [facility]. Patient's  is at the bedside and case discussed. Patient states she feels great. Patient converted back to sinus rhythm yesterday. BUN/creatinine is 18/1.0 with normal electrolytes. Lipid panel is normal with patient having a pro BNP of 3000. WBC is 4.9 with hemoglobin 14.2. D-dimer is only 207 with ferritin of 210. Cardiology note reviewed. Patient was switched to atenolol last night. Temperature is 98.3 with heart rate 76 currently and blood pressure 134/97.   O2 sat 96% on 2 L nasal cannula. CT of the chest with high-resolution showed no chronic or acute pulmonary process noted. There is no evidence of interstitial lung disease with minimal atelectasis or scarring in the left lung base. Urinary output was inaccurate. Past Medical History:    Past Medical History:   Diagnosis Date    Cataract     right eye    Cholecystitis with cholelithiasis     Diverticulitis     Diverticulosis     DJD (degenerative joint disease)     DVT (deep venous thrombosis) (HCC)     RLE    Fibroids     Glaucoma     HTN (hypertension)     Hyperlipidemia     states on med for precaution    Morbid obesity ([address]     GLENNA on CPAP     Osteoarthritis     PE (pulmonary embolism)     Renal cyst     right     Past Surgical History:    Past Surgical History:   Procedure Laterality Date    CATARACT EXTRACTION W/  INTRAOCULAR LENS IMPLANT  12/2011    left eye    CHOLECYSTECTOMY, [facility]      CYSTOSCOPY  jun 2012    cystoscopy, right ureteroscopy with retrograde pyelograms, stone manipulation, stent insertion, laser lithotripsy with holmium laser    HYSTERECTOMY      IL GLAUCOMA SURG,TRABECU AB EXTERNO Right 11/15/2018    RIGHT EYE REVISION OF XEN EYE STENT, TRABECULECTOMY performed by Anuradha Pelletier MD at [address] [place] [address]    for RLE DVT       Medications Prior to Admission:    @  Prior to Admission medications    Medication Sig Start Date End Date Taking?  Authorizing Provider   potassium citrate (UROCIT-K) 10 MEQ (1080 MG) extended release tablet Take by mouth daily   Yes Historical Provider, MD   DULoxetine (CYMBALTA) 30 MG extended release capsule Take 30 mg by mouth daily   Yes Historical Provider, MD   isosorbide mononitrate (IMDUR) 30 MG extended release tablet Take 30 mg by mouth daily   Yes Historical Provider, MD   zinc gluconate 50 MG tablet Take 50 mg by mouth daily   Yes Historical Provider, MD Boswellia-Glucosamine-Vit D (OSTEO BI-FLEX ONE PER DAY PO) Take 1 tablet by mouth daily   Yes Historical Provider, MD   sodium chloride (CANDY 128) 5 % ophthalmic solution Place 1 drop into both eyes 4 times daily   Yes Historical Provider, MD   latanoprost (XALATAN) 0.005 % ophthalmic solution Place 1 drop into the left eye nightly   Yes Historical Provider, MD   dorzolamide (TRUSOPT) 2 % ophthalmic solution Place 1 drop into the left eye in the morning and at bedtime   Yes Historical Provider, MD   brimonidine (ALPHAGAN) 0.2 % ophthalmic solution Place 1 drop into both eyes in the morning and at bedtime   Yes Historical Provider, MD   olopatadine (PATADAY) 0.2 % SOLN ophthalmic solution Place 1 drop into both eyes daily   Yes Historical Provider, MD   levothyroxine (SYNTHROID) 88 MCG tablet Take 1 tablet by mouth Daily 4/24/19   Cyrilla Laxmi, DO   pantoprazole (PROTONIX) 40 MG tablet Take 1 tablet by mouth every morning (before breakfast) 4/24/19   Cyrilla Laxmi, DO   oxybutynin (DITROPAN) 5 MG tablet Take 1 tablet by mouth 2 times daily 4/23/19   Cyrilla Laxmi, DO   simethicone (MYLICON) 80 MG chewable tablet Take 1 tablet by mouth 4 times daily (before meals and nightly)  Patient not taking: Reported on 11/28/2022 4/23/19   Cyrilla Laxmi, DO   hydrocortisone 2.5 % cream Apply topically 2 times daily.   Patient taking differently: Apply 1 application topically 2 times daily Apply topically 2 times daily to where skin rash noted 4/23/19   Cyrilla Laxmi, DO   polyethyl glycol-propyl glycol 0.4-0.3 % (SYSTANE) 0.4-0.3 % ophthalmic solution 1 drop as needed for Dry Eyes  Patient not taking: Reported on 11/28/2022    Historical Provider, MD   brinzolamide-brimonidine 1-0.2 % SUSP Apply 1 drop to eye 2 times daily Left eye  Patient not taking: Reported on 11/28/2022    Historical Provider, MD   bimatoprost (LUMIGAN) 0.01 % SOLN ophthalmic drops Place 1 drop into the left eye nightly  Patient not taking: Reported on 11/28/2022    Historical Provider, MD   Psyllium (METAMUCIL FIBER PO) Take by mouth 2 times daily    Historical Provider, MD   aspirin 81 MG EC tablet Take 81 mg by mouth daily Pt instructed to check hold with dr. Dena Herrera Provider, MD   [facility] OIL Take 1,000 mg by mouth daily Ld 11/9/2018  Patient not taking: Reported on 11/28/2022    Historical Provider, MD   Docusate Sodium (DOCULASE PO) Take 100 mg by mouth in the morning and at bedtime    Historical Provider, MD   amlodipine (NORVASC) 2.5 MG tablet Take 1 tablet by mouth daily. Patient taking differently: Take 10 mg by mouth daily 5/9/12   Gerald Bey,    metoprolol (TOPROL-XL) 50 MG XL tablet Take 1 tablet by mouth daily. 5/9/12   Gerald Bey, DO   nitroGLYCERIN (NITRODUR) 0.4 MG/HR Place 1 patch onto the skin daily. Patient not taking: Reported on 11/28/2022 5/9/12   Gerald Bey,    clonidine (CATAPRES) 0.3 MG/24HR Place 1 patch onto the skin once a week Indications: saturday     Historical Provider, MD   atorvastatin (LIPITOR) 10 MG tablet Take 10 mg by mouth daily. Historical Provider, MD   clopidogrel (PLAVIX) 75 MG tablet Take 75 mg by mouth daily Pt instructed to check hold with dr. Dena Herrera Provider, MD   nitroGLYCERIN (NITROSTAT) 0.4 MG SL tablet Place 0.4 mg under the tongue every 5 minutes as needed.       Historical Provider, MD   paroxetine (PAXIL-CR) 12.5 MG CR tablet Take 12.5 mg by mouth every morning Instructed to take am of procedure  Patient not taking: Reported on 11/28/2022    Historical Provider, MD       Allergies:  Pcn [penicillins] and Noroxin [norfloxacin]    Social History:   Social History     Socioeconomic History    Marital status:      Spouse name: Not on file    Number of children: Not on file    Years of education: Not on file    Highest education level: Not on file   Occupational History    Occupation: parminder     Comment: retired 1985   Tobacco Use    Smoking status: Former     Packs/day: 1.00 Years: 30.00     Pack years: 30.00     Types: Cigarettes     Quit date: 1991     Years since quittin.5    Smokeless tobacco: Never   Vaping Use    Vaping Use: Never used   Substance and Sexual Activity    Alcohol use: No    Drug use: No    Sexual activity: Not on file   Other Topics Concern    Not on file   Social History Narrative    Not on file     Social Determinants of Health     Financial Resource Strain: Not on file   Food Insecurity: Not on file   Transportation Needs: Not on file   Physical Activity: Not on file   Stress: Not on file   Social Connections: Not on file   Intimate Partner Violence: Not on file   Housing Stability: Not on file       Family History:   Family History   Problem Relation Age of Onset    Cancer Brother          - lung cancer    COPD Father     Coronary Art Dis Father     Heart Attack Mother 62        sudden death       REVIEW OF SYSTEMS:    Gen: Patient denies any lightheadedness or dizziness. No LOC or syncope. No fevers or chills. HEENT: No earache,+ sore throat, nasal congestion. Resp: + Moist white productive cough, no hemoptysis  Cardiac: Denies chest pain,+ SOB, no diaphoresis or palpitations. GI: No nausea, vomiting, +diarrhea. No melena or hematochezia. : No urinary complaints, dysuria, hematuria or frequency. MSK: No extremity weakness, paralysis or paresthesias. PHYSICAL EXAM:    Vitals:  BP (!) 134/97   Pulse 76   Temp 98 °F (36.7 °C) (Oral)   Resp 18   Ht 5' 5\" (1.651 m)   Wt (!) 342 lb 11.2 oz (155.4 kg)   SpO2 96%   BMI 57.03 kg/m²     General:  This is a 80 y.o. yo female who is alert and oriented in moderate distress secondary to above  HEENT:  Head is normocephalic and atraumatic, PERRLA, EOMI, mucus membranes moist with no pharyngeal erythema or exudate. Neck:  Supple with no carotid bruits, JVD or thyromegaly.   No cervical adenopathy  CV:  Irregular rate and rhythm, distant heart sounds, no murmurs  Lungs: Coarse breath sounds with scattered rhonchi with mild expiratory wheeze to auscultation bilaterally   Abdomen:  Soft, nontender, + obese, nondistended, bowel sounds present  Extremities:  + Nonpitting and trace pitting lower leg edema, peripheral pulses intact bilaterally  Neuro:  Cranial nerves II-XII grossly intact; motor and sensory function intact with no focal deficits  Skin:  No rashes, lesions or wounds    DATA:  CBC with Differential:    Lab Results   Component Value Date/Time    WBC 6.5 11/28/2022 11:22 AM    RBC 4.78 11/28/2022 11:22 AM    HGB 14.3 11/28/2022 11:22 AM    HCT 42.4 11/28/2022 11:22 AM     11/28/2022 11:22 AM    MCV 88.7 11/28/2022 11:22 AM    MCH 29.9 11/28/2022 11:22 AM    MCHC 33.7 11/28/2022 11:22 AM    RDW 14.2 11/28/2022 11:22 AM    SEGSPCT 55 05/24/2012 02:10 PM    LYMPHOPCT 12.9 11/28/2022 11:22 AM    MONOPCT 16.5 11/28/2022 11:22 AM    MYELOPCT 1 03/27/2012 12:50 PM    BASOPCT 0.2 11/28/2022 11:22 AM    MONOSABS 1.07 11/28/2022 11:22 AM    LYMPHSABS 0.84 11/28/2022 11:22 AM    EOSABS 0.04 11/28/2022 11:22 AM    BASOSABS 0.01 11/28/2022 11:22 AM     CMP:    Lab Results   Component Value Date/Time     11/28/2022 11:22 AM    K 3.3 11/28/2022 11:22 AM     11/28/2022 11:22 AM    CO2 25 11/28/2022 11:22 AM    BUN 16 11/28/2022 11:22 AM    CREATININE 0.8 11/28/2022 11:22 AM    GFRAA >60 04/23/2019 06:15 AM    LABGLOM >60 11/28/2022 11:22 AM    GLUCOSE 118 11/28/2022 11:22 AM    GLUCOSE 105 05/24/2012 02:10 PM    PROT 5.8 04/23/2019 06:15 AM    LABALBU 3.6 04/23/2019 06:15 AM    LABALBU 3.9 05/24/2012 02:10 PM    CALCIUM 9.2 11/28/2022 11:22 AM    BILITOT 1.1 04/23/2019 06:15 AM    ALKPHOS 88 04/23/2019 06:15 AM    AST 18 04/23/2019 06:15 AM    ALT 11 04/23/2019 06:15 AM     Magnesium:    Lab Results   Component Value Date/Time    MG 1.8 04/12/2019 04:20 PM     Phosphorus:    Lab Results   Component Value Date/Time    PHOS 4.2 05/10/2012 03:23 AM     PT/INR:    Lab Results Component Value Date/Time    PROTIME 16.4 05/11/2012 07:26 AM    INR 1.7 05/11/2012 07:26 AM     Troponin:    Lab Results   Component Value Date/Time    TROPONINI <0.01 04/12/2019 04:20 PM     U/A:    Lab Results   Component Value Date/Time    COLORU DKYELLOW 04/13/2019 10:37 PM    PROTEINU TRACE 04/13/2019 10:37 PM    PHUR 6.5 04/13/2019 10:37 PM    WBCUA >20 04/13/2019 10:37 PM    WBCUA 10-20 05/26/2012 11:50 AM    RBCUA 0-1 04/13/2019 10:37 PM    RBCUA >20 05/26/2012 11:50 AM    YEAST RARE 04/13/2019 10:37 PM    BACTERIA RARE 04/13/2019 10:37 PM    CLARITYU SLCLOUDY 04/13/2019 10:37 PM    SPECGRAV <=1.005 04/13/2019 10:37 PM    LEUKOCYTESUR LARGE 04/13/2019 10:37 PM    UROBILINOGEN 1.0 04/13/2019 10:37 PM    BILIRUBINUR Negative 04/13/2019 10:37 PM    BILIRUBINUR NEGATIVE 05/26/2012 11:50 AM    BLOODU TRACE 04/13/2019 10:37 PM    GLUCOSEU Negative 04/13/2019 10:37 PM    GLUCOSEU NEGATIVE 05/26/2012 11:50 AM     ABG:  No results found for: PH, PCO2, PO2, HCO3, BE, THGB, TCO2, O2SAT  HgBA1c:  No results found for: LABA1C  FLP:  No results found for: TRIG, HDL, LDLCALC, LDLDIRECT, LABVLDL  TSH:    Lab Results   Component Value Date/Time    TSH 1.990 04/13/2019 07:50 AM     IRON:  No results found for: IRON  LIPASE:    Lab Results   Component Value Date/Time    LIPASE 23 04/12/2019 04:20 PM       ASSESSMENT AND PLAN:      Patient Active Problem List    Diagnosis Date Noted    Atrial fibrillation with rapid ventricular response ([address] 11/28/2022    Moderate protein-calorie malnutrition ([address] 04/13/2019    Pneumonia 04/12/2019    Acute renal failure (ARF) ([address] 05/09/2012    Pyelonephritis 05/09/2012    Obstructive uropathy 05/09/2012    Kidney stone 05/09/2012    HTN (hypertension)     Hyperlipidemia     Obstructive sleep apnea     Glaucoma     DJD (degenerative joint disease)     Morbid obesity ([facility]      Impression:  1. New onset atrial fibrillation with RVR  2.   CHF per chest x-ray which probably related to atrial fib with RVR  3. Positive COVID-19 infection-CRP only 2.5  4. Acute hypoxic respiratory failure-most likely related to #2 and 3, 6  5. History hypertension  6. History of GLENNA  7. Morbid obesity  8. History DVT-right lower extremity with vena caval filter placement 1999  9. History of hyperlipidemia  10. Diarrhea-possibly related to COVID    Plan:  Admit to [facility] medication reviewed  Metoprolol 5 mg IV push x1 now in ED to evaluate response  Increase oral metoprolol 50 mg daily XL and adjust-consider switching to atenolol  Monitor heart rate, blood pressure, O2 saturations  General diet with no added salt and fluid restriction  Accurate I's and O's  PT/OT  Contact and respiratory isolation  O2 nasal cannula as needed to keep saturation greater than 92-96%  DuoNeb aerosol  Pulmicort aerosol  Decadron 6 mg p.o. daily  Lasix 20 mg IV push daily  KCl 20 mg twice daily  Echocardiogram  Cardizem drip and transition to beta-blockers  D-dimer  Vitamin C, vitamin D, zinc, vitamin B1  Lovenox 80 mg SQ twice daily and transition to Eliquis  Consult cardiology    CMP, CBC in a.m.       Carlos Richmond DO, D.O.  11/29/2022  11:30 AM

## 2022-11-29 NOTE — CARE COORDINATION
Patient is COVID POSITIVE. Call placed to patients room to discuss transition of care over phone  she states she is feeling somewhat better today. Patient worked with PT today she states and she did not wear her oxygen and walked in the room and states \" I am even talking to you and not wearing oxygen\". We talked about DINA vs HHC, she does not want to go to a nursing facility. She is agreeable to [facility] [place]. Her  is currently at bedside in room with her and agrees she will be ok to go home, they both agree to [facility] for patient  choices offered, she states history with [facility] and would use again. Referral to [facility] at [facility]. Await acceptance, will need [facility] orders at [place]  Patient reports no concerns with being home with her  and reports no concerns at this time. The Plan for Transition of Care is related to the following treatment goals: Need for HHC at [place]    The Patient and patient representative Jacob Tucker was provided with a choice of provider and agrees   with the discharge plan. [x] Yes [] No    Freedom of choice list was provided with basic dialogue that supports the patient's individualized plan of care/goals, treatment preferences and shares the quality data associated with the providers.  [x] Yes [] No

## 2022-11-29 NOTE — PROGRESS NOTES
Subjective: The patient is awake and alert. No problems overnight. Denies chest pain, angina, and dyspnea. Denies abdominal pain. Tolerating diet. No nausea or vomiting.     Current Facility-Administered Medications: atenolol (TENORMIN) tablet 25 mg, 25 mg, Oral, Daily  atorvastatin (LIPITOR) tablet 10 mg, 10 mg, Oral, Daily  clopidogrel (PLAVIX) tablet 75 mg, 75 mg, Oral, Daily  nitroGLYCERIN (NITROSTAT) SL tablet 0.4 mg, 0.4 mg, SubLINGual, Q5 Min PRN  aspirin EC tablet 81 mg, 81 mg, Oral, Daily  levothyroxine (SYNTHROID) tablet 88 mcg, 88 mcg, Oral, Daily  pantoprazole (PROTONIX) tablet 40 mg, 40 mg, Oral, QAM AC  oxybutynin (DITROPAN) tablet 5 mg, 5 mg, Oral, BID  cloNIDine (CATAPRES) tablet 0.1 mg, 0.1 mg, Oral, BID  psyllium (KONSYL) 28.3 % packet 1 packet, 1 packet, Oral, BID  latanoprost (XALATAN) 0.005 % ophthalmic solution 1 drop, 1 drop, Left Eye, Nightly  acetaminophen (TYLENOL) tablet 500 mg, 500 mg, Oral, Q6H PRN  enoxaparin (LOVENOX) injection 80 mg, 80 mg, SubCUTAneous, BID  dexamethasone (DECADRON) tablet 6 mg, 6 mg, Oral, Daily  ascorbic acid (VITAMIN C) tablet 1,000 mg, 1,000 mg, Oral, Daily  zinc gluconate tablet 50 mg, 50 mg, Oral, Daily  vitamin B-6 (PYRIDOXINE) tablet 50 mg, 50 mg, Oral, Daily  prochlorperazine (COMPAZINE) injection 5 mg, 5 mg, IntraVENous, Q6H PRN  polyethylene glycol (GLYCOLAX) packet 17 g, 17 g, Oral, Daily PRN  potassium chloride (KLOR-CON M) extended release tablet 20 mEq, 20 mEq, Oral, BID WC  furosemide (LASIX) injection 20 mg, 20 mg, IntraVENous, Daily  ipratropium-albuterol (DUONEB) nebulizer solution 1 ampule, 1 ampule, Inhalation, 4x daily  budesonide (PULMICORT) nebulizer suspension 500 mcg, 500 mcg, Nebulization, BID  Carboxymeth-Glycerin-Polysorb 0.5-1-0.5 % SOLN 1 drop, 1 drop, Left Eye, Nightly  baricitinib (OLUMIANT) tablet 4 mg, 4 mg, Oral, Daily    Objective:    /81   Pulse 71   Temp 98.9 °F (37.2 °C) (Oral)   Resp 18   Ht 5' 5\" (1.651 m) Wt (!) 342 lb 11.2 oz (155.4 kg)   SpO2 95%   BMI 57.03 kg/m²   In: 1580.9 [P.O.:720; I.V.:44.3]  Out: -    In: 1580.9   Out: -    RRR, no murmurs, no gallops, or rubs.   CTA bilaterally, no wheeze, rales or rhonchi  bowel sounds present, nontender, nondistended, no masses  No clubbing, cyanosis, or edema  No neuro changes     CBC with Differential:    Lab Results   Component Value Date/Time    WBC 4.9 11/29/2022 11:06 AM    RBC 4.83 11/29/2022 11:06 AM    HGB 14.2 11/29/2022 11:06 AM    HCT 43.1 11/29/2022 11:06 AM     11/29/2022 11:06 AM    MCV 89.2 11/29/2022 11:06 AM    MCH 29.4 11/29/2022 11:06 AM    MCHC 32.9 11/29/2022 11:06 AM    RDW 14.1 11/29/2022 11:06 AM    SEGSPCT 55 05/24/2012 02:10 PM    LYMPHOPCT 17.3 11/29/2022 11:06 AM    MONOPCT 12.6 11/29/2022 11:06 AM    MYELOPCT 1 03/27/2012 12:50 PM    BASOPCT 0.0 11/29/2022 11:06 AM    MONOSABS 0.61 11/29/2022 11:06 AM    LYMPHSABS 0.84 11/29/2022 11:06 AM    EOSABS 0.00 11/29/2022 11:06 AM    BASOSABS 0.00 11/29/2022 11:06 AM     Hemoglobin/Hematocrit:    Lab Results   Component Value Date/Time    HGB 14.2 11/29/2022 11:06 AM    HCT 43.1 11/29/2022 11:06 AM     CMP:    Lab Results   Component Value Date/Time     11/29/2022 11:06 AM    K 4.0 11/29/2022 11:06 AM     11/29/2022 11:06 AM    CO2 21 11/29/2022 11:06 AM    BUN 18 11/29/2022 11:06 AM    CREATININE 1.0 11/29/2022 11:06 AM    GFRAA >60 04/23/2019 06:15 AM    LABGLOM 56 11/29/2022 11:06 AM    GLUCOSE 117 11/29/2022 11:06 AM    GLUCOSE 105 05/24/2012 02:10 PM    PROT 7.0 11/29/2022 11:06 AM    LABALBU 4.0 11/29/2022 11:06 AM    LABALBU 3.9 05/24/2012 02:10 PM    CALCIUM 9.5 11/29/2022 11:06 AM    BILITOT 1.1 11/29/2022 11:06 AM    ALKPHOS 115 11/29/2022 11:06 AM    AST 33 11/29/2022 11:06 AM    ALT 12 11/29/2022 11:06 AM     BMP:    Lab Results   Component Value Date/Time     11/29/2022 11:06 AM    K 4.0 11/29/2022 11:06 AM     11/29/2022 11:06 AM    CO2 21 11/29/2022 11:06 AM    BUN 18 11/29/2022 11:06 AM    LABALBU 4.0 11/29/2022 11:06 AM    LABALBU 3.9 05/24/2012 02:10 PM    CREATININE 1.0 11/29/2022 11:06 AM    CALCIUM 9.5 11/29/2022 11:06 AM    GFRAA >60 04/23/2019 06:15 AM    LABGLOM 56 11/29/2022 11:06 AM    GLUCOSE 117 11/29/2022 11:06 AM    GLUCOSE 105 05/24/2012 02:10 PM     Hepatic Function Panel:    Lab Results   Component Value Date/Time    ALKPHOS 115 11/29/2022 11:06 AM    ALT 12 11/29/2022 11:06 AM    AST 33 11/29/2022 11:06 AM    PROT 7.0 11/29/2022 11:06 AM    BILITOT 1.1 11/29/2022 11:06 AM    BILIDIR 0.4 04/13/2019 07:50 AM    IBILI 1.0 04/13/2019 07:50 AM    LABALBU 4.0 11/29/2022 11:06 AM    LABALBU 3.9 05/24/2012 02:10 PM     Albumin:    Lab Results   Component Value Date/Time    LABALBU 4.0 11/29/2022 11:06 AM    LABALBU 3.9 05/24/2012 02:10 PM     Troponin:    Lab Results   Component Value Date/Time    TROPONINI <0.01 04/12/2019 04:20 PM     Last 3 Troponin:    Lab Results   Component Value Date/Time    TROPONINI <0.01 04/12/2019 04:20 PM     FLP:    Lab Results   Component Value Date/Time    TRIG 101 11/29/2022 11:06 AM    HDL 52 11/29/2022 11:06 AM    LDLCALC 61 11/29/2022 11:06 AM    LABVLDL 20 11/29/2022 11:06 AM     TSH:    Lab Results   Component Value Date/Time    TSH 1.170 11/29/2022 11:06 AM          Patient Active Problem List   Diagnosis    Acute renal failure (ARF) ([facility]    Pyelonephritis    Obstructive uropathy    HTN (hypertension)    Hyperlipidemia    Obstructive sleep apnea    Glaucoma    DJD (degenerative joint disease)    Morbid obesity ([facility]    Kidney stone    Pneumonia    Moderate protein-calorie malnutrition (HCC)    Atrial fibrillation with rapid ventricular response (HCC)       Impression/Plan:     New onset atrial fibrillation with RVR-converted tto NSR  CHF per chest x-ray rate related CHF acute systolic and diastolic due to atrial fib with RVR  Positive COVID-19 infection-CRP only 2.5  Acute hypoxic respiratory failure  History hypertension  History of GLENNA  Morbid obesity  History DVT-right lower extremity with vena caval filter placement 1999  History of hyperlipidemia   Diarrhea-possibly related to COVID  Hypokalemia resolved     Atenolol 25 mg daily  Lovenox  Echocardiogram-preserved EF  D Dimer wnl  CT lungs reviewed  Monitor closely  Given her comorbidities and acute covid 19 would start eliquis at discharge

## 2022-11-29 NOTE — PROGRESS NOTES
Pharmacy Consultation Note    Consult date: 11/28/2022  Physician/provider: Dr. Juanita Escobedo has been consulted to evaluate criteria for Baricitinib therapy. Based on the algorithm, the patient DOES currently meet [facility] P&T approved Covid-19 treatment criteria for Baricitinib.     Thank you for the consult,  Gianna Marquez, [facility]

## 2022-11-29 NOTE — CONSULTS
Cardiology Consult    The patient is a 80 y.o. female who presents with cold-like symptoms for the last 3 days. Patient has a moist white productive cough. Patient's also had some associated diarrhea. She denies any chest pain, palpitation. She has some mild shortness of breath. Patient has not followed up with this because apparently her  has refused her to take her anywhere to check out her symptoms.  has a history of a stroke and heart attack and since then has had a personality change and has not been as caring or nice to the patient. Patient was brought into the hospital where is noted she was in atrial fibrillation with RVR with no history of this. Patient's troponin was essentially unremarkable with a BUN/creatinine of 16/0.8. Potassium 3.3. WBC 6.5 with hemoglobin 14.3. Patient was tested for COVID which was positive. Patient's heart rate has been very tachycardic in the ED. Temperature was 98.5 with blood pressure which has been mildly elevated or high normal.  Patient was put on Cardizem drip in the ED. Chest x-ray suspected mild pulmonary congestion with cardiomegaly.        Past Medical History:    Past Medical History[]Expand by Default        Past Medical History:   Diagnosis Date    Cataract       right eye    Cholecystitis with cholelithiasis      Diverticulitis      Diverticulosis      DJD (degenerative joint disease)      DVT (deep venous thrombosis) (HCC)       RLE    Fibroids      Glaucoma      HTN (hypertension)      Hyperlipidemia       states on med for precaution    Morbid obesity ([address]      GLENNA on CPAP      Osteoarthritis      PE (pulmonary embolism)      Renal cyst       right         Past Surgical History:    Past Surgical History[]Expand by Default         Past Surgical History:   Procedure Laterality Date    CATARACT EXTRACTION W/  INTRAOCULAR LENS IMPLANT   12/2011     left eye    CHOLECYSTECTOMY, [address]   eriberto 2012     cystoscopy, right ureteroscopy with retrograde pyelograms, stone manipulation, stent insertion, laser lithotripsy with holmium laser    HYSTERECTOMY        [place] GLAUCOMA SURG,TRABECU AB EXTERNO Right 11/15/2018     RIGHT EYE REVISION OF XEN EYE STENT, TRABECULECTOMY performed by Nate Martin MD at [facility]     for RLE DVT            Medications Prior to Admission:    @  Home Medications[]Expand by Default           Prior to Admission medications    Medication Sig Start Date End Date Taking? Authorizing Provider   levothyroxine (SYNTHROID) 88 MCG tablet Take 1 tablet by mouth Daily 4/24/19     Aliya Pulido, DO   pantoprazole (PROTONIX) 40 MG tablet Take 1 tablet by mouth every morning (before breakfast) 4/24/19     Aliya Pulido, DO   oxybutynin (DITROPAN) 5 MG tablet Take 1 tablet by mouth 2 times daily 4/23/19     Aliya Pulido, DO   simethicone (MYLICON) 80 MG chewable tablet Take 1 tablet by mouth 4 times daily (before meals and nightly) 4/23/19     Aliya Pulido, DO   hydrocortisone 2.5 % cream Apply topically 2 times daily. 4/23/19     Aliya Tess, DO   polyethyl glycol-propyl glycol 0.4-0.3 % (SYSTANE) 0.4-0.3 % ophthalmic solution 1 drop as needed for Dry Eyes       Historical Provider, MD   brinzolamide-brimonidine 1-0.2 % SUSP Apply 1 drop to eye 2 times daily Left eye       Historical Provider, MD   bimatoprost (LUMIGAN) 0.01 % SOLN ophthalmic drops Place 1 drop into the left eye nightly       Historical Provider, MD   Psyllium (METAMUCIL FIBER PO) Take by mouth 2 times daily       Historical Provider, MD   aspirin 81 MG EC tablet Take 81 mg by mouth daily Pt instructed to check hold with dr. Smiley Low Provider, MD   [facility] OIL Take 1,000 mg by mouth daily Ld 11/9/2018       Historical Provider, MD   Docusate Sodium (DOCULASE PO) Take 1 tablet by mouth as needed.          Historical Provider, Transportation Needs: Not on file   Physical Activity: Not on file   Stress: Not on file   Social Connections: Not on file   Intimate Partner Violence: Not on file   Housing Stability: Not on file            Family History:   Family History[]Expand by Default         Family History   Problem Relation Age of Onset    Cancer Brother            - lung cancer    COPD Father      Coronary Art Dis Father      Heart Attack Mother 62         sudden death            REVIEW OF SYSTEMS:     Gen: Patient denies any lightheadedness or dizziness. No LOC or syncope. No fevers or chills. HEENT: No earache,+ sore throat, nasal congestion. Resp: + Moist white productive cough, no hemoptysis  Cardiac: Denies chest pain,+ SOB, no diaphoresis or palpitations. GI: No nausea, vomiting, +diarrhea. No melena or hematochezia. : No urinary complaints, dysuria, hematuria or frequency. MSK: No extremity weakness, paralysis or paresthesias. PHYSICAL EXAM:     Vitals:  BP (!) 134/98   Pulse (!) 135   Temp 98.5 °F (36.9 °C) (Oral)   Resp 24   Wt (!) 350 lb (158.8 kg)   SpO2 96%   BMI 60.08 kg/m²      General:  This is a 80 y.o. yo female who is alert and oriented in moderate distress secondary to above  HEENT:  Head is normocephalic and atraumatic, PERRLA, EOMI, mucus membranes moist with no pharyngeal erythema or exudate. Neck:  Supple with no carotid bruits, JVD or thyromegaly.   No cervical adenopathy  CV:  Irregular rate and rhythm, distant heart sounds, no murmurs  Lungs: Coarse breath sounds with scattered rhonchi with mild expiratory wheeze to auscultation bilaterally   Abdomen:  Soft, nontender, + obese, nondistended, bowel sounds present  Extremities:  + Nonpitting and trace pitting lower leg edema, peripheral pulses intact bilaterally  Neuro:  Cranial nerves II-XII grossly intact; motor and sensory function intact with no focal deficits  Skin:  No rashes, lesions or wounds     DATA:  CBC with Differential:          Lab Results   Component Value Date/Time     WBC 6.5 11/28/2022 11:22 AM     RBC 4.78 11/28/2022 11:22 AM     HGB 14.3 11/28/2022 11:22 AM     HCT 42.4 11/28/2022 11:22 AM      11/28/2022 11:22 AM     MCV 88.7 11/28/2022 11:22 AM     MCH 29.9 11/28/2022 11:22 AM     MCHC 33.7 11/28/2022 11:22 AM     RDW 14.2 11/28/2022 11:22 AM     SEGSPCT 55 05/24/2012 02:10 PM     LYMPHOPCT 12.9 11/28/2022 11:22 AM     MONOPCT 16.5 11/28/2022 11:22 AM     MYELOPCT 1 03/27/2012 12:50 PM     BASOPCT 0.2 11/28/2022 11:22 AM     MONOSABS 1.07 11/28/2022 11:22 AM     LYMPHSABS 0.84 11/28/2022 11:22 AM     EOSABS 0.04 11/28/2022 11:22 AM     BASOSABS 0.01 11/28/2022 11:22 AM      CMP:          Lab Results   Component Value Date/Time      11/28/2022 11:22 AM     K 3.3 11/28/2022 11:22 AM      11/28/2022 11:22 AM     CO2 25 11/28/2022 11:22 AM     BUN 16 11/28/2022 11:22 AM     CREATININE 0.8 11/28/2022 11:22 AM     GFRAA >60 04/23/2019 06:15 AM     LABGLOM >60 11/28/2022 11:22 AM     GLUCOSE 118 11/28/2022 11:22 AM     GLUCOSE 105 05/24/2012 02:10 PM     PROT 5.8 04/23/2019 06:15 AM     LABALBU 3.6 04/23/2019 06:15 AM     LABALBU 3.9 05/24/2012 02:10 PM     CALCIUM 9.2 11/28/2022 11:22 AM     BILITOT 1.1 04/23/2019 06:15 AM     ALKPHOS 88 04/23/2019 06:15 AM     AST 18 04/23/2019 06:15 AM     ALT 11 04/23/2019 06:15 AM      Magnesium:          Lab Results   Component Value Date/Time     MG 1.8 04/12/2019 04:20 PM      Phosphorus:          Lab Results   Component Value Date/Time     PHOS 4.2 05/10/2012 03:23 AM      PT/INR:          Lab Results   Component Value Date/Time     PROTIME 16.4 05/11/2012 07:26 AM     INR 1.7 05/11/2012 07:26 AM      Troponin:          Lab Results   Component Value Date/Time     TROPONINI <0.01 04/12/2019 04:20 PM      U/A:          Lab Results   Component Value Date/Time     COLORU DKYELLOW 04/13/2019 10:37 PM     PROTEINU TRACE 04/13/2019 10:37 PM     PHUR 6.5 04/13/2019 10:37 PM     WBCUA >20 04/13/2019 10:37 PM     WBCUA 10-20 05/26/2012 11:50 AM     RBCUA 0-1 04/13/2019 10:37 PM     RBCUA >20 05/26/2012 11:50 AM     YEAST RARE 04/13/2019 10:37 PM     BACTERIA RARE 04/13/2019 10:37 PM     CLARITYU SLCLOUDY 04/13/2019 10:37 PM     SPECGRAV <=1.005 04/13/2019 10:37 PM     LEUKOCYTESUR LARGE 04/13/2019 10:37 PM     UROBILINOGEN 1.0 04/13/2019 10:37 PM     BILIRUBINUR Negative 04/13/2019 10:37 PM     BILIRUBINUR NEGATIVE 05/26/2012 11:50 AM     BLOODU TRACE 04/13/2019 10:37 PM     GLUCOSEU Negative 04/13/2019 10:37 PM     GLUCOSEU NEGATIVE 05/26/2012 11:50 AM      ABG:  No results found for: PH, PCO2, PO2, HCO3, BE, THGB, TCO2, O2SAT  HgBA1c:  No results found for: LABA1C  FLP:  No results found for: TRIG, HDL, LDLCALC, LDLDIRECT, LABVLDL  TSH:          Lab Results   Component Value Date/Time     TSH 1.990 04/13/2019 07:50 AM      IRON:  No results found for: IRON  LIPASE:          Lab Results   Component Value Date/Time     LIPASE 23 04/12/2019 04:20 PM         ASSESSMENT AND PLAN:            Patient Active Problem List     Diagnosis Date Noted    Atrial fibrillation with rapid ventricular response ([facility] 11/28/2022    Moderate protein-calorie malnutrition ([facility] 04/13/2019    Pneumonia 04/12/2019    Acute renal failure (ARF) ([address] 05/09/2012    Pyelonephritis 05/09/2012    Obstructive uropathy 05/09/2012    Kidney stone 05/09/2012    HTN (hypertension)      Hyperlipidemia      Obstructive sleep apnea      Glaucoma      DJD (degenerative joint disease)      Morbid obesity ([address]        Impression/Plan:    New onset atrial fibrillation with RVR  CHF per chest x-ray rate related CHF acute systolic and diastolic due to atrial fib with RVR  Positive COVID-19 infection-CRP only 2.5  Acute hypoxic respiratory failure-most likely related to #2 and 3, 6  History hypertension  History of GLENNA  Morbid obesity  History DVT-right lower extremity with vena caval filter placement 1999  History of hyperlipidemia   Diarrhea-possibly related to COVID  Hypokalemia    Atenolol 50mg now  KCL  Wean diltiazem as possible  Lovenox  Echocardiogram  D Dimer  HSCRP  CT lungs  Monitor closely

## 2022-11-29 NOTE — PROGRESS NOTES
Physical Therapy  Physical Therapy Initial Evaluation/Plan of Care    Room #:  2465/5797-44  Patient Name: Brianda Coyne  YOB: 1940  MRN: [id number]    Date of Service: 11/29/2022     Tentative placement recommendation: Subacute vs Home Health Physical Therapy if patient meets goals  Equipment recommendation: Patient has needed equipment       Evaluating Physical Therapist: Janette Black, PT, DPT #[id number]      Specific Provider Orders/Date/Referring Provider :     11/28/22 1815    PT eval and treat  Start:  11/28/22 1815,   End:  11/28/22 1815,   ONE TIME,   Standing Count:  1 Occurrences,   R         Hedy Gregg, DO Acknowledge New     Admitting Diagnosis:   Atrial fibrillation with rapid ventricular response ([address] [I48.91]  Acute respiratory failure with hypoxia ([address] [J96.01]      Surgery: none  Visit Diagnoses         Codes    Acute respiratory failure with hypoxia ([address]     J96.01            Patient Active Problem List   Diagnosis    Acute renal failure (ARF) ([address]    Pyelonephritis    Obstructive uropathy    HTN (hypertension)    Hyperlipidemia    Obstructive sleep apnea    Glaucoma    DJD (degenerative joint disease)    Morbid obesity ([address]    Kidney stone    Pneumonia    Moderate protein-calorie malnutrition ([address]    Atrial fibrillation with rapid ventricular response ([address]        ASSESSMENT of Current Deficits Patient exhibits decreased strength, balance, and endurance impairing functional mobility, transfers, gait , gait distance, and tolerance to activity. Pt mildly unsteady during ambulation with decreased tolerance for function. Pt O2 dropped from 96%-89% on RA following ambulation. Pt educated regarding importance of incentive spirometer.        PHYSICAL THERAPY  PLAN OF CARE       Physical therapy plan of care is established based on physician order,  patient diagnosis and clinical assessment    Current Treatment Recommendations:    -Bed Mobility: Lower extremity exercises  and Trunk control activities   -Sitting Balance: Incorporate reaching activities to activate trunk muscles , Hands on support to maintain midline , Facilitate active trunk muscle engagement , Facilitate postural control in all planes , and Engage in core activities to allow for movement within base of support   -Standing Balance: Perform strengthening exercises in standing to promote motor control with or without upper extremity support , Instruct patient on adequate base of support to maintain balance, and Challenge balance utilizing reaching  activities beyond center of gravity    -Transfers: Provide instruction on proper hand and foot position for adequate transfer of weight onto lower extremities and use of gait device if needed, Cues for hand placement, technique and safety. Provide stabilization to prevent fall , Facilitate weight shift forward on to lower extremities and provide necessary stabilization of bilateral lower extremities , Support transfer of weight on to lower extremities, and Assist with extension of knees trunk and hip to accept weight transfer   -Gait: Gait training, Standing activities to improve: base of support, weight shift, weight bearing , Exercises to improve trunk control, Exercises to improve hip and knee control, Performance of protected weight bearing activities, and Activities to increase weight bearing   -Endurance: Utilize Supervised activities to increase level of endurance to allow for safe functional mobility including transfers and gait  and Use graduated activities to promote good breathing techniques and provide support and education to maximize respiratory function    PT long term treatment goals are located in below grid    Patient and or family understand(s) diagnosis, prognosis, and plan of care. Frequency of treatments: Patient will be seen  daily.          Prior Level of Function: Patient ambulated with wheeled walker   Rehab Potential: good - for baseline    Past medical history:   Past Medical History:   Diagnosis Date    Cataract     right eye    Cholecystitis with cholelithiasis     Diverticulitis     Diverticulosis     DJD (degenerative joint disease)     DVT (deep venous thrombosis) (HCC)     RLE    Fibroids     Glaucoma     HTN (hypertension)     Hyperlipidemia     states on med for precaution    Morbid obesity ([address]     GLENNA on CPAP     Osteoarthritis     PE (pulmonary embolism)     Renal cyst     right     Past Surgical History:   Procedure Laterality Date    CATARACT EXTRACTION W/  INTRAOCULAR LENS IMPLANT  12/2011    left eye    CHOLECYSTECTOMY, LAPAROSCOPIC  1995    COLONOSCOPY      CYSTOSCOPY  jun 2012    cystoscopy, right ureteroscopy with retrograde pyelograms, stone manipulation, stent insertion, laser lithotripsy with holmium laser    HYSTERECTOMY      [place] GLAUCOMA SURG,TRABECU AB EXTERNO Right 11/15/2018    RIGHT EYE REVISION OF XEN EYE STENT, TRABECULECTOMY performed by Jose Lynn MD at [facility]    for RLE DVT       SUBJECTIVE:    Precautions:  Up with assistance, falls, O2, Droplet plus/COVID-19, and morbid obesity      Social history: Patient lives with spouse in a mobile home  with Ramp  to enter without [address] in shower grab bars    Equipment owned: Wheelchair, Wheeled Walker, and Shower chair,      [address] [place] [address]   How much difficulty turning over in bed?: A Little  How much difficulty sitting down on / standing up from a chair with arms?: A Lot  How much difficulty moving from lying on back to sitting on side of bed?: A Little  How much help from another person moving to and from a bed to a chair?: A Little  How much help from another person needed to walk in hospital room?: A Little  How much help from another person for climbing 3-5 steps with a railing?: A Lot  AM-PAC Inpatient Mobility Raw Score : 16  AM-PAC Inpatient T-Scale Score : 40.78  Mobility Inpatient CMS 0-100% Score: 54.16  Mobility Inpatient CMS G-Code Modifier : CK    Nursing cleared patient for PT evaluation. The admitting diagnosis and active problem list as listed above have been reviewed prior to the initiation of this evaluation. OBJECTIVE;   Initial Evaluation  Date: 11/29/2022 Treatment Date:     Short Term/ Long Term   Goals   Was pt agreeable to Eval/treatment? Yes  To be met in 3 days   Pain level   0/10       Bed Mobility    Rolling: Not assessed patient seated edge of bed    Supine to sit: Not assessed patient seated edge of bed    Sit to supine: Not assessed patient seated edge of bed    Scooting: Not assessed patient seated edge of bed    Rolling: Independent    Supine to sit:  Independent    Sit to supine: Independent    Scooting: Independent     Transfers Sit to stand:  Min/ModA    Sit to stand: Independent    Ambulation     25', 15' and 10 feet using  wheeled walker with Minimal assist of 1   for balance, upright, weight shift, and safety    > 50 feet using  wheeled walker with Modified Independent    Stair negotiation: ascended and descended   Not assessed        ROM Within functional limits    Increase range of motion 10% of affected joints    Strength BUE:  refer to OT eval  RLE:  3+/5  LLE:  3+/5  Increase strength in affected mm groups by 1/3 grade   Balance Sitting EOB:  fair +  Dynamic Standing:  fair  with [employer]  Sitting EOB:  good   Dynamic Standing: fair +     Patient is Alert & Oriented x person, place, time, and situation and follows directions    Sensation:  Patient  denies numbness/tingling   Edema:  no   Endurance: fair  -    Vitals: room air   Blood Pressure at rest  Blood Pressure during session    Heart Rate at rest  Heart Rate during session    SPO2 at rest %  SPO2 during session 89-96%     Patient education  Patient educated on role of Physical Therapy, risks of immobility, safety and plan of care, energy conservation, importance of mobility while in hospital , purse lip breathing, ankle pumps, quad set and glut set for edema control, blood clot prevention, importance and purpose of adaptive device and adjusted to proper height for the patient. , safety , and O2 line management and safety      Patient response to education:   Pt verbalized understanding Pt demonstrated skill Pt requires further education in this area   Yes Partial Yes      Treatment:  Patient practiced and was instructed/facilitated in the following treatment: Patient Sat edge of bed 15 minutes with Supervision  to increase dynamic sitting balance and activity tolerance. Pt performed bed mobility, transfers, ambulation in room, instruction for PLB and proper use of incentive spirometer. Therapeutic Exercises:  not performed      At end of session, patient sitting edge of bed with spouse present call light and phone within reach,  all lines and tubes intact, nursing notified. Patient would benefit from continued skilled Physical Therapy to improve functional independence and quality of life. Patient's/ family goals   get stronger    Time in  1225  Time out  1301    Total Treatment Time  16 minutes    Evaluation time includes thorough review of current medical information, gathering information on past medical history/social history and prior level of function, completion of standardized testing/informal observation of tasks, assessment of data, and development of Plan of care and goals.      CPT codes:  Low Complexity PT evaluation (64191)  Therapeutic activities (25462)   16 minutes  1 unit(s)    Odie Essex, PT

## 2022-11-29 NOTE — PROGRESS NOTES
Pt's  did come back to hospital with med list. Med list reviewed with pt. [facility] updated for Dr. Jesus Jurado to reconcile in a.m.

## 2022-11-29 NOTE — PROGRESS NOTES
Pt medlist not updated since April, 2019.  and pt not sure of correct home meds.  stated he would bring in list tomorrow and when asked if he could call tonight with list he said NO. Dr. Jessica Sorenson notified and ok to hold home meds tonight until med list verified. Pt aware meds to be held and agreeable.

## 2022-11-30 VITALS
HEIGHT: 65 IN | TEMPERATURE: 98.6 F | HEART RATE: 74 BPM | BODY MASS INDEX: 48.82 KG/M2 | DIASTOLIC BLOOD PRESSURE: 75 MMHG | RESPIRATION RATE: 20 BRPM | WEIGHT: 293 LBS | OXYGEN SATURATION: 97 % | SYSTOLIC BLOOD PRESSURE: 131 MMHG

## 2022-11-30 LAB
CREAT SERPL-MCNC: 1.1 MG/DL (ref 0.5–1)
GFR SERPL CREATININE-BSD FRML MDRD: 50 ML/MIN/1.73

## 2022-11-30 PROCEDURE — 82565 ASSAY OF CREATININE: CPT

## 2022-11-30 PROCEDURE — 6360000002 HC RX W HCPCS: Performed by: INTERNAL MEDICINE

## 2022-11-30 PROCEDURE — 6370000000 HC RX 637 (ALT 250 FOR IP): Performed by: INTERNAL MEDICINE

## 2022-11-30 PROCEDURE — 97530 THERAPEUTIC ACTIVITIES: CPT | Performed by: OCCUPATIONAL THERAPIST

## 2022-11-30 PROCEDURE — 94640 AIRWAY INHALATION TREATMENT: CPT

## 2022-11-30 PROCEDURE — 36415 COLL VENOUS BLD VENIPUNCTURE: CPT

## 2022-11-30 PROCEDURE — 97165 OT EVAL LOW COMPLEX 30 MIN: CPT | Performed by: OCCUPATIONAL THERAPIST

## 2022-11-30 PROCEDURE — 97530 THERAPEUTIC ACTIVITIES: CPT

## 2022-11-30 PROCEDURE — 97110 THERAPEUTIC EXERCISES: CPT

## 2022-11-30 RX ORDER — ATENOLOL 25 MG/1
25 TABLET ORAL DAILY
Qty: 30 TABLET | Refills: 3 | Status: SHIPPED | OUTPATIENT
Start: 2022-12-01

## 2022-11-30 RX ORDER — DEXAMETHASONE 6 MG/1
6 TABLET ORAL DAILY
Qty: 5 TABLET | Refills: 0 | Status: SHIPPED | OUTPATIENT
Start: 2022-12-01 | End: 2022-12-06

## 2022-11-30 RX ADMIN — LEVOTHYROXINE SODIUM 88 MCG: 0.09 TABLET ORAL at 05:03

## 2022-11-30 RX ADMIN — ENOXAPARIN SODIUM 80 MG: 100 INJECTION SUBCUTANEOUS at 09:16

## 2022-11-30 RX ADMIN — DORZOLAMIDE HYDROCHLORIDE 1 DROP: 20 SOLUTION/ DROPS OPHTHALMIC at 09:18

## 2022-11-30 RX ADMIN — IPRATROPIUM BROMIDE AND ALBUTEROL SULFATE 1 AMPULE: .5; 2.5 SOLUTION RESPIRATORY (INHALATION) at 14:31

## 2022-11-30 RX ADMIN — OXYBUTYNIN CHLORIDE 5 MG: 5 TABLET ORAL at 09:17

## 2022-11-30 RX ADMIN — DEXAMETHASONE 6 MG: 6 TABLET ORAL at 09:16

## 2022-11-30 RX ADMIN — IPRATROPIUM BROMIDE AND ALBUTEROL SULFATE 1 AMPULE: .5; 2.5 SOLUTION RESPIRATORY (INHALATION) at 10:04

## 2022-11-30 RX ADMIN — BRIMONIDINE TARTRATE 1 DROP: 2 SOLUTION OPHTHALMIC at 09:18

## 2022-11-30 RX ADMIN — BUDESONIDE 500 MCG: 0.5 SUSPENSION RESPIRATORY (INHALATION) at 06:24

## 2022-11-30 RX ADMIN — PYRIDOXINE HCL TAB 50 MG 50 MG: 50 TAB at 09:16

## 2022-11-30 RX ADMIN — ATENOLOL 25 MG: 25 TABLET ORAL at 09:16

## 2022-11-30 RX ADMIN — PANTOPRAZOLE SODIUM 40 MG: 40 TABLET, DELAYED RELEASE ORAL at 05:03

## 2022-11-30 RX ADMIN — CLONIDINE HYDROCHLORIDE 0.1 MG: 0.1 TABLET ORAL at 09:16

## 2022-11-30 RX ADMIN — PSYLLIUM HUSK 1 PACKET: 3.4 GRANULE ORAL at 09:16

## 2022-11-30 RX ADMIN — Medication 50 MG: at 09:16

## 2022-11-30 RX ADMIN — FUROSEMIDE 20 MG: 10 INJECTION, SOLUTION INTRAMUSCULAR; INTRAVENOUS at 09:17

## 2022-11-30 RX ADMIN — CLOPIDOGREL BISULFATE 75 MG: 75 TABLET ORAL at 09:16

## 2022-11-30 RX ADMIN — ASPIRIN 81 MG: 81 TABLET, COATED ORAL at 09:16

## 2022-11-30 RX ADMIN — POTASSIUM CHLORIDE 20 MEQ: 1500 TABLET, EXTENDED RELEASE ORAL at 09:17

## 2022-11-30 RX ADMIN — IPRATROPIUM BROMIDE AND ALBUTEROL SULFATE 1 AMPULE: .5; 2.5 SOLUTION RESPIRATORY (INHALATION) at 06:24

## 2022-11-30 RX ADMIN — OXYCODONE HYDROCHLORIDE AND ACETAMINOPHEN 1000 MG: 500 TABLET ORAL at 09:17

## 2022-11-30 NOTE — PROGRESS NOTES
Occupational Therapy  OCCUPATIONAL THERAPY INITIAL EVALUATION     [facility]  [address]         Date:2022                                                   Patient Name: Caron Islas     MRN: [id number]     : 1940     Room: [address] [place] [address]       Evaluating OT: Selena Mario OTR/L; [id number]       Referring Provider and Orders/Date:    OT eval and treat  Start:  22,   End:  22,   ONE TIME,   Standing Count:  1 Occurrences,   R         Haile Polo DO        Diagnosis:   1. Atrial fibrillation with rapid ventricular response ([address]    2.  Acute respiratory failure with hypoxia [facility])         Surgery: none      Pertinent Medical History:        Past Medical History:   Diagnosis Date    Cataract     right eye    Cholecystitis with cholelithiasis     Diverticulitis     Diverticulosis     DJD (degenerative joint disease)     DVT (deep venous thrombosis) (HCC)     RLE    Fibroids     Glaucoma     HTN (hypertension)     Hyperlipidemia     states on med for precaution    Morbid obesity ([address]     GLENNA on CPAP     Osteoarthritis     PE (pulmonary embolism)     Renal cyst     right          Past Surgical History:   Procedure Laterality Date    CATARACT EXTRACTION W/  INTRAOCULAR LENS IMPLANT  2011    left eye    CHOLECYSTECTOMY, LAPAROSCOPIC      COLONOSCOPY      CYSTOSCOPY  2012    cystoscopy, right ureteroscopy with retrograde pyelograms, stone manipulation, stent insertion, laser lithotripsy with holmium laser    HYSTERECTOMY      IN GLAUCOMA SURG,TRABECU AB EXTERNO Right 11/15/2018    RIGHT EYE REVISION OF XEN EYE STENT, TRABECULECTOMY performed by Jose Lynn MD at [address]    for RLE DVT       Precautions:  Fall Risk, covid+ with droplet iso    Recommended placement: home with [facility]    Assessment of current deficits     [x] Functional mobility  [x]ADLs  [x] Strength               []Cognition     [x] Functional transfers   [x] IADLs         [x] Safety Awareness   [x]Endurance     [] Fine Coordination              [x] Balance      [] Vision/perception   []Sensation      [x]Gross Motor Coordination  [] ROM  [] Delirium                   [] Motor Control     OT PLAN OF CARE   OT POC based on physician orders, patient diagnosis and results of clinical assessment    Frequency/Duration 1-3 days/wk for 2 weeks PRN   Specific OT Treatment Interventions to include:   * Instruction/training on adapted ADL techniques and AE recommendations to increase functional independence within precautions       * Training on energy conservation strategies, correct breathing pattern and techniques to improve independence/tolerance for self-care routine  * Functional transfer/mobility training/DME recommendations for increased independence, safety, and fall prevention  * Patient/Family education to increase follow through with safety techniques and functional independence  * Recommendation of environmental modifications for increased safety with functional transfers/mobility and ADLs  * Therapeutic exercise to improve motor endurance, ROM, and functional strength for ADLs/functional transfers  * Therapeutic activities to facilitate/challenge dynamic balance, stand tolerance for increased safety and independence with ADLs  * Therapeutic activities to facilitate gross/fine motor skills for increased independence with ADLs  * Neuro-muscular re-education: facilitation of righting/equilibrium reactions, midline orientation, scapular stability/mobility, normalization of muscle tone, and facilitation of volitional active controled movement  * Positioning to improve skin integrity, interaction with environment and functional independence     Recommended Adaptive Equipment/DME: 3:1 bariatric       Home Living: Pt lives with spouse in a duplex on the bottom floor with Ramp to enter without Rail. Spouse provides 24/7. Pt does not go to basement level. Bathroom setup: walk in shower, shower chair with grab bars    DME owned: [employer], [address]     Prior Level of Function: Mod A with ADLs , mod A with IADLs due to limited standing tolerance; ambulated with ww   Driving: no   Occupation: retired   Enjoys: dog,out to eat    Pain Level: left knee 3/10; chronic  Cognition: A&O: 4/4; Follows 3 step directions   Memory:  Intact   Sequencing:  Intact   Problem solving:  Intact   Judgement/safety:  Intact    [facility] Daily Activity Inpatient   How much help for putting on and taking off regular lower body clothing?: Total  How much help for Bathing?: A Lot  How much help for Toileting?: A Lot  How much help for putting on and taking off regular upper body clothing?: A Lot  How much help for taking care of personal grooming?: A Little  How much help for eating meals?: None  [facility] Inpatient Daily Activity Raw Score: 14  AM-PAC Inpatient ADL T-Scale Score : 33.39  ADL Inpatient CMS 0-100% Score: 59.67  ADL Inpatient CMS G-Code Modifier : CK    Functional Assessment:     Initial Eval Status  Date: 11/30/2022   Treatment Status  Date: STGs = LTGs  Time frame: 10-14 days   Feeding Independent  NA-PLOF   Grooming Supervision and setup with oral care from sitting EOB  Independent    UB Dressing Maximal Assist with gown management from sitting EOB  Moderate Assist    LB Dressing Dependent with socks from sitting EOB  Maximal Assist    Bathing Maximal Assist with sponge bathing from sitting/standing EOB. Pt required assist for LB, jaswinder care and buttocks. Moderate Assist    Toileting Maximal Assist with brief management and jaswinder care  Moderate Assist    Bed Mobility  Supine to sit: Minimal Assist   Sit to supine: Minimal Assist     Supine to sit:  Independent   Sit to supine: Independent    Functional Transfers Minimal Assist from bed and 3:1 with elevated surfaces and walker. Modified [place]    Functional Mobility Minimal Assist with walker for short distances  Modified [place]    Balance Sitting:     Static:  fair+    Dynamic:fair  Standing: fair-  Sitting:     Static:  good    Dynamic:fair+  Standing: fair   Activity Tolerance Vitals with activity:room air 96% Hr 61  Sitting EOB for 8min average and standing tolerance 1min average. Increase standing tolerance for >4min with stable vital signs for carry over into toileting, functional tranfers and indep in ADLs   Visual/  Perceptual Glasses: none; macular degeneration and glaucoma, uses magnifying glass    Reports change in vision since admission: No     NA     Hand Dominance  [x] Right  [] Left    AROM (PROM) Strength Additional Info:  Goal:   RUE  WFL with shoulder limitations 3+/5 good  and fair FMC/dexterity noted during ADL tasks  Opposition [x] Intact [] Impaired  Finger to nose [x] Intact [] Impaired 4+/5MMT generally for carry over into self care, functional transfers and functional mobility with AD. LUE WFL with shoulder limitations 3+/5 good  and fair  FMC/dexterity noted during ADL tasks-arthritis   Opposition [x] Intact [] Impaired  Finger to nose [x] Intact [] Impaired 4+/5MMT generally for carry over into self care, functional transfers and functional mobility with AD. Hearing: WFL   Sensation:  No c/o numbness or tingling   Tone: WFL   Edema: none    Comments: Upon arrival patient supine. Pt required max A for most UB ADLs and dep A LB ADLs tasks. Limited with min A for standing during LB ADLs and functional transfers. The biggest barriers reflect that of functional transfers, functional mobility, UB/LB ADLs, activity tolerance, balance, safety and strengthening. At end of session, patient sitting EOB with call light and phone within reach, all lines and tubes intact.   Overall patient demonstrated decreased independence and safety during completion of ADL/functional transfer/mobility tasks compared to PLOF. Nursing updated on pt position and status following OT eval. Pt would benefit from continued skilled OT to increase safety and independence with completion of ADL/IADL tasks for functional independence and quality of life. Treatment: OT treatment provided this date includes:  Instruction, education and training on safe facilitation and adapted techniques for completion of ADLs. These include neuromuscular reeducation to facilitate balance/righting reactions,safe functional transfer techniques, proper positioning/alignment to improve interaction with environment and overall function and on adapted techniques/work simplification for completion of ADLs. Education provided on hand/feet placement with bed rails, 3:1, walker and body mechanics for fall prevention. Cues for energy conservation and safety for in the home at [place], including modifications and DME. Extended time to complete all tasks, including skilled monitoring of patient's response during treatment session and vital signs. Prior to and at the end of session, environmental modifications / line management completed for patients safety and efficiency of treatment session. See above for further details. Rehab Potential: Good  for established goals     Patient / Family Goal: Pain management      Patient and/or family were instructed on functional diagnosis, prognosis/goals and OT plan of care. Demonstrated good understanding. Eval Complexity: Low  History: Brief review of medical records and additional review of physical, cognitive, or psychosocial history related to current functional performance  Exam: 3+ performance deficits  Assistance/Modification: Mod assistance or modifications required to perform tasks. May have comorbidities that affect occupational performance.     Time In: 1903  Time Out: 0921  Total Treatment Time: 11    Min Units   OT Eval Low 97165  x  1   OT Eval Medium 19996      OT Eval High 59797      OT Re-Eval 62031       Therapeutic Ex 47976       Therapeutic Activities 77879  89 1    ADL/Self Care 95953       Orthotic Management 24002       Manual 59805     Neuro Re-Ed 94923       Non-Billable Time          Evaluation Time additionally includes thorough review of current medical information, gathering information on past medical history/social history and prior level of function, interpretation of standardized testing/informal observation of tasks, assessment of data and development of plan of care and goals.             Mauricio Ortega OTR/L; [id number]

## 2022-11-30 NOTE — CARE COORDINATION
[facility]. NEED HHC ORDERS prior to DC. Positive

## 2022-11-30 NOTE — CARE COORDINATION
Patient is COVID Positive, worked with OT today and eval reviewed. Call placed to patients room, we discussed eval and SW offered [facility] for some rehab again but she again declined. She is agreeable to [facility] at [place] but she wants to go home with her . Referral was made to [facility] at [address] per patient choice yesterday and they can accept. NEED C ORDERS at [place].  will transport home.

## 2022-11-30 NOTE — PROGRESS NOTES
Physical Therapy  Physical Therapy Treatment Note/Plan of Care    Room #:  3362/8591-11  Patient Name: Brian Matt  YOB: 1940  MRN: [id number]    Date of Service: 11/30/2022     Tentative placement recommendation: Subacute vs Home Health Physical Therapy if patient meets goals  Equipment recommendation: Patient has needed equipment       Evaluating Physical Therapist: Carlos Knapp, PT, DPT #[id number]      Specific Provider Orders/Date/Referring Provider :     11/28/22 1815    PT eval and treat  Start:  11/28/22 1815,   End:  11/28/22 1815,   ONE TIME,   Standing Count:  1 Occurrences,   R         Madeline Coelho, DO Acknowledge New     Admitting Diagnosis:   Atrial fibrillation with rapid ventricular response ([address] [I48.91]  Acute respiratory failure with hypoxia ([address] [J96.01]      Surgery: none  Visit Diagnoses         Codes    Acute respiratory failure with hypoxia ([address]     J96.01            Patient Active Problem List   Diagnosis    Acute renal failure (ARF) ([address]    Pyelonephritis    Obstructive uropathy    HTN (hypertension)    Hyperlipidemia    Obstructive sleep apnea    Glaucoma    DJD (degenerative joint disease)    Morbid obesity ([address]    Kidney stone    Pneumonia    Moderate protein-calorie malnutrition ([address]    Atrial fibrillation with rapid ventricular response ([address]        ASSESSMENT of Current Deficits Patient exhibits decreased strength, balance, and endurance impairing functional mobility, transfers, gait , gait distance, and tolerance to activity. Pt needing minimal assist with sit to stand transfers. Pt on room air and her SPO2 was 92-97% throughout tx session but needed rest periods due to shortness of breath and impaired endurance. Pt educated regarding importance of incentive spirometer and performing it properly to increase lung capacity.       PHYSICAL THERAPY  PLAN OF CARE       Physical therapy plan of care is established based on physician order,  patient diagnosis and clinical assessment    Current Treatment Recommendations:    -Bed Mobility: Lower extremity exercises  and Trunk control activities   -Sitting Balance: Incorporate reaching activities to activate trunk muscles , Hands on support to maintain midline , Facilitate active trunk muscle engagement , Facilitate postural control in all planes , and Engage in core activities to allow for movement within base of support   -Standing Balance: Perform strengthening exercises in standing to promote motor control with or without upper extremity support , Instruct patient on adequate base of support to maintain balance, and Challenge balance utilizing reaching  activities beyond center of gravity    -Transfers: Provide instruction on proper hand and foot position for adequate transfer of weight onto lower extremities and use of gait device if needed, Cues for hand placement, technique and safety. Provide stabilization to prevent fall , Facilitate weight shift forward on to lower extremities and provide necessary stabilization of bilateral lower extremities , Support transfer of weight on to lower extremities, and Assist with extension of knees trunk and hip to accept weight transfer   -Gait: Gait training, Standing activities to improve: base of support, weight shift, weight bearing , Exercises to improve trunk control, Exercises to improve hip and knee control, Performance of protected weight bearing activities, and Activities to increase weight bearing   -Endurance: Utilize Supervised activities to increase level of endurance to allow for safe functional mobility including transfers and gait  and Use graduated activities to promote good breathing techniques and provide support and education to maximize respiratory function    PT long term treatment goals are located in below grid    Patient and or family understand(s) diagnosis, prognosis, and plan of care. Frequency of treatments: Patient will be seen  daily.          Prior another person for climbing 3-5 steps with a railing?: A Lot  AM-PAC Inpatient Mobility Raw Score : 17  AM-PAC Inpatient T-Scale Score : 42.13  Mobility Inpatient CMS 0-100% Score: 50.57  Mobility Inpatient CMS G-Code Modifier : CK    Nursing cleared patient for PT treatment. OBJECTIVE;   Initial Evaluation  Date: 11/29/2022 Treatment Date:  11/30/2022       Short Term/ Long Term   Goals   Was pt agreeable to Eval/treatment? Yes yes To be met in 3 days   Pain level   0/10   0/10    Bed Mobility    Rolling: Not assessed patient seated edge of bed    Supine to sit: Not assessed patient seated edge of bed    Sit to supine: Not assessed patient seated edge of bed    Scooting: Not assessed patient seated edge of bed   Rolling: Not assessed patient seated edge of bed   Supine to sit: Not assessed patient seated edge of bed   Sit to supine: Not assessed patient seated edge of bed   Scooting: Not assessed patient seated edge of bed    Rolling: Independent    Supine to sit:  Independent    Sit to supine: Independent    Scooting: Independent     Transfers Sit to stand:  Min/ModA   Sit to stand: Minimal assist of 1 Cues for hand placement and safety     Sit to stand: Independent    Ambulation     25', 15' and 10 feet using  wheeled walker with Minimal assist of 1   for balance, upright, weight shift, and safety 3 feet to the BSC; 25 feet using  wheeled walker with Minimal assist of 1   cues for upright posture, safety, pacing, and pursed lip breathing     > 50 feet using  wheeled walker with Modified Independent    Stair negotiation: ascended and descended   Not assessed        ROM Within functional limits    Increase range of motion 10% of affected joints    Strength BUE:  refer to OT eval  RLE:  3+/5  LLE:  3+/5  Increase strength in affected mm groups by 1/3 grade   Balance Sitting EOB:  fair +  Dynamic Standing:  fair  with [employer] Sitting EOB: good   Dynamic Standing: fair wheeled walker   Sitting EOB:  good   Dynamic Standing: fair +     Patient is Alert & Oriented x person, place, time, and situation and follows directions    Sensation:  Patient  denies numbness/tingling   Edema:  no   Endurance: fair  -    Vitals: room air   Blood Pressure at rest  Blood Pressure during session    Heart Rate at rest  Heart Rate during session    SPO2 at rest 97%  SPO2 during session 92%     Patient education  Patient educated on role of Physical Therapy, risks of immobility, safety and plan of care, energy conservation,  importance of mobility while in hospital , purse lip breathing, ankle pumps, quad set and glut set for edema control, blood clot prevention, importance and purpose of adaptive device and adjusted to proper height for the patient. , safety , and O2 line management and safety      Patient response to education:   Pt verbalized understanding Pt demonstrated skill Pt requires further education in this area   Yes Partial Yes      Treatment:  Patient practiced and was instructed/facilitated in the following treatment: Patient Sat edge of bed 15 minutes with Supervision  to increase dynamic sitting balance and activity tolerance. Pt educated and performed on the proper use of incentive spirometer, pursed lip breathing, and pacing. Pt stood, took steps to the [facility], assisted on/off BSC, ambulated in the room, and back to the edge of the bed. Pt performed seated exercises. Therapeutic Exercises:  ankle pumps, hip abduction/adduction, long arc quad, and seated marching, x 10 - 15 reps. At end of session, patient sitting edge of bed with spouse present call light and phone within reach,  all lines and tubes intact, nursing notified. Patient would benefit from continued skilled Physical Therapy to improve functional independence and quality of life.          Patient's/ family goals   get stronger    Time in  13:30  Time out  14:10    Total Treatment Time  40 minutes        CPT codes:    Therapeutic activities ()   31 minutes  2 unit(s)  Therapeutic exercises (29861)   9 minutes  1 unit(s)    Emiliano Prieto  [facility]  LIC # [id number]

## 2022-11-30 NOTE — PROGRESS NOTES
CLINICAL PHARMACY NOTE: MEDS TO BEDS    Total # of Prescriptions Filled: 3   The following medications were delivered to the patient:  Dexamethasone 6 mg  Eliquis 5 mg  Atenolol 25 mg    Additional Documentation:

## 2022-11-30 NOTE — DISCHARGE SUMMARY
Department of Internal Medicine        CHIEF COMPLAINT: 3-day history of cold-like symptoms, diarrhea, abdominal pain    Reason for Admission: New onset atrial fibrillation with RVR, positive COVID    HISTORY OF PRESENT ILLNESS:      The patient is a 80 y.o. female who presents with cold-like symptoms for the last 3 days. Patient has a moist white productive cough. Patient's also had some associated diarrhea. She denies any chest pain, palpitation. She has some mild shortness of breath. Patient has not followed up with this because apparently her  has refused her to take her anywhere to check out her symptoms.  has a history of a stroke and heart attack and since then has had a personality change and has not been as caring or nice to the patient. Patient was brought into the hospital where is noted she was in atrial fibrillation with RVR with no history of this. Patient's troponin was essentially unremarkable with a BUN/creatinine of 16/0.8. Potassium 3.3. WBC 6.5 with hemoglobin 14.3. Patient was tested for COVID which was positive. Patient's heart rate has been very tachycardic in the ED. Temperature was 98.5 with blood pressure which has been mildly elevated or high normal.  Patient was put on Cardizem drip in the ED. Chest x-ray suspected mild pulmonary congestion with cardiomegaly. Case discussed with patient's  at the bedside. 11/29/2022  Patient seen examined on [facility]. Patient's  is at the bedside and case discussed. Patient states she feels great. Patient converted back to sinus rhythm yesterday. BUN/creatinine is 18/1.0 with normal electrolytes. Lipid panel is normal with patient having a pro BNP of 3000. WBC is 4.9 with hemoglobin 14.2. D-dimer is only 207 with ferritin of 210. Cardiology note reviewed. Patient was switched to atenolol last night. Temperature is 98.3 with heart rate 76 currently and blood pressure 134/97.   O2 sat 96% on 2 L nasal cannula. CT of the chest with high-resolution showed no chronic or acute pulmonary process noted. There is no evidence of interstitial lung disease with minimal atelectasis or scarring in the left lung base. Urinary output was inaccurate. 11/30/2022  Patient seen examined on [address]. Patient's  is at bedside case discussed. Patient states she feels a lot better. Patient denies any chest or abdominal pain. Temperature is 98.4 with heart rate of 65 blood pressure 134/71. O2 sat 98% room air at rest.  Patient has been in sinus rhythm since admission. O2 sats are normal.  Echocardiogram showed EF of 64% with trace MR, mild AAS, trace TR. Cardiology note from yesterday reviewed. Patient had O2 saturation with activity on room air and saturation was 92-95%. Patient is eager to be discharged home. Patient stable for discharge.     Past Medical History:    Past Medical History:   Diagnosis Date    Cataract     right eye    Cholecystitis with cholelithiasis     Diverticulitis     Diverticulosis     DJD (degenerative joint disease)     DVT (deep venous thrombosis) (HCC)     RLE    Fibroids     Glaucoma     HTN (hypertension)     Hyperlipidemia     states on med for precaution    Morbid obesity ([address]     GLENNA on CPAP     Osteoarthritis     PE (pulmonary embolism)     Renal cyst     right     Past Surgical History:    Past Surgical History:   Procedure Laterality Date    CATARACT EXTRACTION W/  INTRAOCULAR LENS IMPLANT  12/2011    left eye    CHOLECYSTECTOMY, [address]  jun 2012    cystoscopy, right ureteroscopy with retrograde pyelograms, stone manipulation, stent insertion, laser lithotripsy with holmium laser    HYSTERECTOMY      CA GLAUCOMA SURG,TRABECU AB EXTERNO Right 11/15/2018    RIGHT EYE REVISION OF XEN EYE STENT, TRABECULECTOMY performed by Jairo Umanzor MD at [id number]    for RLE DVT       Medications Prior to Admission:    @  Prior to Admission medications    Medication Sig Start Date End Date Taking? Authorizing Provider   potassium citrate (UROCIT-K) 10 MEQ (1080 MG) extended release tablet Take by mouth daily   Yes Historical Provider, MD   DULoxetine (CYMBALTA) 30 MG extended release capsule Take 30 mg by mouth daily   Yes Historical Provider, MD   isosorbide mononitrate (IMDUR) 30 MG extended release tablet Take 30 mg by mouth daily   Yes Historical Provider, MD   zinc gluconate 50 MG tablet Take 50 mg by mouth daily   Yes Historical Provider, MD   Boswellia-Glucosamine-Vit D (OSTEO BI-FLEX ONE PER DAY PO) Take 1 tablet by mouth daily   Yes Historical Provider, MD   sodium chloride (CANDY 128) 5 % ophthalmic solution Place 1 drop into both eyes 4 times daily   Yes Historical Provider, MD   latanoprost (XALATAN) 0.005 % ophthalmic solution Place 1 drop into the left eye nightly   Yes Historical Provider, MD   dorzolamide (TRUSOPT) 2 % ophthalmic solution Place 1 drop into the left eye in the morning and at bedtime   Yes Historical Provider, MD   brimonidine (ALPHAGAN) 0.2 % ophthalmic solution Place 1 drop into both eyes in the morning and at bedtime   Yes Historical Provider, MD   olopatadine (PATADAY) 0.2 % SOLN ophthalmic solution Place 1 drop into both eyes daily   Yes Historical Provider, MD   levothyroxine (SYNTHROID) 88 MCG tablet Take 1 tablet by mouth Daily 4/24/19   Lisa Marc DO   pantoprazole (PROTONIX) 40 MG tablet Take 1 tablet by mouth every morning (before breakfast) 4/24/19   Lisa Marc DO   oxybutynin (DITROPAN) 5 MG tablet Take 1 tablet by mouth 2 times daily 4/23/19   Lisa Marc DO   simethicone (MYLICON) 80 MG chewable tablet Take 1 tablet by mouth 4 times daily (before meals and nightly)  Patient not taking: Reported on 11/28/2022 4/23/19   Lisa Marc DO   hydrocortisone 2.5 % cream Apply topically 2 times daily.   Patient taking differently: Apply 1 application topically 2 times daily Apply topically 2 times daily to where skin rash noted 4/23/19   Elba Engel, DO   polyethyl glycol-propyl glycol 0.4-0.3 % (SYSTANE) 0.4-0.3 % ophthalmic solution 1 drop as needed for Dry Eyes  Patient not taking: Reported on 11/28/2022    Historical Provider, MD   brinzolamide-brimonidine 1-0.2 % SUSP Apply 1 drop to eye 2 times daily Left eye  Patient not taking: Reported on 11/28/2022    Historical Provider, MD   bimatoprost (LUMIGAN) 0.01 % SOLN ophthalmic drops Place 1 drop into the left eye nightly  Patient not taking: Reported on 11/28/2022    Historical Provider, MD   Psyllium (METAMUCIL FIBER PO) Take by mouth 2 times daily    Historical Provider, MD   aspirin 81 MG EC tablet Take 81 mg by mouth daily Pt instructed to check hold with dr. Anita Hedrick Provider, MD   [facility] OIL Take 1,000 mg by mouth daily Ld 11/9/2018  Patient not taking: Reported on 11/28/2022    Historical Provider, MD   Docusate Sodium (DOCULASE PO) Take 100 mg by mouth in the morning and at bedtime    Historical Provider, MD   amlodipine (NORVASC) 2.5 MG tablet Take 1 tablet by mouth daily. Patient taking differently: Take 10 mg by mouth daily 5/9/12   Elba Engel DO   metoprolol (TOPROL-XL) 50 MG XL tablet Take 1 tablet by mouth daily. 5/9/12   Elba Engel DO   nitroGLYCERIN (NITRODUR) 0.4 MG/HR Place 1 patch onto the skin daily. Patient not taking: Reported on 11/28/2022 5/9/12   Elba Engel, DO   clonidine (CATAPRES) 0.3 MG/24HR Place 1 patch onto the skin once a week Indications: saturday     Historical Provider, MD   atorvastatin (LIPITOR) 10 MG tablet Take 10 mg by mouth daily. Historical Provider, MD   clopidogrel (PLAVIX) 75 MG tablet Take 75 mg by mouth daily Pt instructed to check hold with dr. Anita Chino MD   nitroGLYCERIN (NITROSTAT) 0.4 MG SL tablet Place 0.4 mg under the tongue every 5 minutes as needed.       Historical Provider, MD paroxetine (PAXIL-CR) 12.5 MG CR tablet Take 12.5 mg by mouth every morning Instructed to take am of procedure  Patient not taking: Reported on 2022    Historical Provider, MD       Allergies:  Pcn [penicillins] and Noroxin [norfloxacin]    Social History:   Social History     Socioeconomic History    Marital status:      Spouse name: Not on file    Number of children: Not on file    Years of education: Not on file    Highest education level: Not on file   Occupational History    Occupation: parminder     Comment: retired    Tobacco Use    Smoking status: Former     Packs/day: 1.00     Years: 30.00     Pack years: 30.00     Types: Cigarettes     Quit date: 1991     Years since quittin.5    Smokeless tobacco: Never   Vaping Use    Vaping Use: Never used   Substance and Sexual Activity    Alcohol use: No    Drug use: No    Sexual activity: Not on file   Other Topics Concern    Not on file   Social History Narrative    Not on file     Social Determinants of Health     Financial Resource Strain: Not on file   Food Insecurity: Not on file   Transportation Needs: Not on file   Physical Activity: Not on file   Stress: Not on file   Social Connections: Not on file   Intimate Partner Violence: Not on file   Housing Stability: Not on file       Family History:   Family History   Problem Relation Age of Onset    Cancer Brother          - lung cancer    COPD Father     Coronary Art Dis Father     Heart Attack Mother 62        sudden death       REVIEW OF SYSTEMS:    Gen: Patient denies any lightheadedness or dizziness. No LOC or syncope. No fevers or chills. HEENT: No earache,+ sore throat, nasal congestion. Resp: + Moist white productive cough, no hemoptysis  Cardiac: Denies chest pain,+ SOB, no diaphoresis or palpitations. GI: No nausea, vomiting, +diarrhea. No melena or hematochezia. : No urinary complaints, dysuria, hematuria or frequency.     MSK: No extremity weakness, paralysis or paresthesias. PHYSICAL EXAM:    Vitals:  /71   Pulse 65   Temp 98.4 °F (36.9 °C) (Oral)   Resp 20   Ht 5' 5\" (1.651 m)   Wt (!) 342 lb 11.2 oz (155.4 kg)   SpO2 98%   BMI 57.03 kg/m²     General:  This is a 80 y.o. yo female who is alert and oriented in moderate distress secondary to above  HEENT:  Head is normocephalic and atraumatic, PERRLA, EOMI, mucus membranes moist with no pharyngeal erythema or exudate. Neck:  Supple with no carotid bruits, JVD or thyromegaly.   No cervical adenopathy  CV:  Irregular rate and rhythm, distant heart sounds, no murmurs  Lungs: Coarse breath sounds with scattered rhonchi with mild expiratory wheeze to auscultation bilaterally   Abdomen:  Soft, nontender, + obese, nondistended, bowel sounds present  Extremities:  + Nonpitting and trace pitting lower leg edema, peripheral pulses intact bilaterally  Neuro:  Cranial nerves II-XII grossly intact; motor and sensory function intact with no focal deficits  Skin:  No rashes, lesions or wounds    DATA:  CBC with Differential:    Lab Results   Component Value Date/Time    WBC 4.9 11/29/2022 11:06 AM    RBC 4.83 11/29/2022 11:06 AM    HGB 14.2 11/29/2022 11:06 AM    HCT 43.1 11/29/2022 11:06 AM     11/29/2022 11:06 AM    MCV 89.2 11/29/2022 11:06 AM    MCH 29.4 11/29/2022 11:06 AM    MCHC 32.9 11/29/2022 11:06 AM    RDW 14.1 11/29/2022 11:06 AM    SEGSPCT 55 05/24/2012 02:10 PM    LYMPHOPCT 17.3 11/29/2022 11:06 AM    MONOPCT 12.6 11/29/2022 11:06 AM    MYELOPCT 1 03/27/2012 12:50 PM    BASOPCT 0.0 11/29/2022 11:06 AM    MONOSABS 0.61 11/29/2022 11:06 AM    LYMPHSABS 0.84 11/29/2022 11:06 AM    EOSABS 0.00 11/29/2022 11:06 AM    BASOSABS 0.00 11/29/2022 11:06 AM     CMP:    Lab Results   Component Value Date/Time     11/29/2022 11:06 AM    K 4.0 11/29/2022 11:06 AM     11/29/2022 11:06 AM    CO2 21 11/29/2022 11:06 AM    BUN 18 11/29/2022 11:06 AM    CREATININE 1.0 11/29/2022 11:06 AM    GFRAA >60 04/23/2019 06:15 AM    LABGLOM 56 11/29/2022 11:06 AM    GLUCOSE 117 11/29/2022 11:06 AM    GLUCOSE 105 05/24/2012 02:10 PM    PROT 7.0 11/29/2022 11:06 AM    LABALBU 4.0 11/29/2022 11:06 AM    LABALBU 3.9 05/24/2012 02:10 PM    CALCIUM 9.5 11/29/2022 11:06 AM    BILITOT 1.1 11/29/2022 11:06 AM    ALKPHOS 115 11/29/2022 11:06 AM    AST 33 11/29/2022 11:06 AM    ALT 12 11/29/2022 11:06 AM     Magnesium:    Lab Results   Component Value Date/Time    MG 1.9 11/29/2022 11:06 AM     Phosphorus:    Lab Results   Component Value Date/Time    PHOS 3.0 11/29/2022 11:06 AM     PT/INR:    Lab Results   Component Value Date/Time    PROTIME 16.4 05/11/2012 07:26 AM    INR 1.7 05/11/2012 07:26 AM     Troponin:    Lab Results   Component Value Date/Time    TROPONINI <0.01 04/12/2019 04:20 PM     U/A:    Lab Results   Component Value Date/Time    COLORU DKYELLOW 04/13/2019 10:37 PM    PROTEINU TRACE 04/13/2019 10:37 PM    PHUR 6.5 04/13/2019 10:37 PM    WBCUA >20 04/13/2019 10:37 PM    WBCUA 10-20 05/26/2012 11:50 AM    RBCUA 0-1 04/13/2019 10:37 PM    RBCUA >20 05/26/2012 11:50 AM    YEAST RARE 04/13/2019 10:37 PM    BACTERIA RARE 04/13/2019 10:37 PM    CLARITYU SLCLOUDY 04/13/2019 10:37 PM    SPECGRAV <=1.005 04/13/2019 10:37 PM    LEUKOCYTESUR LARGE 04/13/2019 10:37 PM    UROBILINOGEN 1.0 04/13/2019 10:37 PM    BILIRUBINUR Negative 04/13/2019 10:37 PM    BILIRUBINUR NEGATIVE 05/26/2012 11:50 AM    BLOODU TRACE 04/13/2019 10:37 PM    GLUCOSEU Negative 04/13/2019 10:37 PM    GLUCOSEU NEGATIVE 05/26/2012 11:50 AM     ABG:  No results found for: PH, PCO2, PO2, HCO3, BE, THGB, TCO2, O2SAT  HgBA1c:  No results found for: LABA1C  FLP:    Lab Results   Component Value Date/Time    TRIG 101 11/29/2022 11:06 AM    HDL 52 11/29/2022 11:06 AM    LDLCALC 61 11/29/2022 11:06 AM    LABVLDL 20 11/29/2022 11:06 AM     TSH:    Lab Results   Component Value Date/Time    TSH 1.170 11/29/2022 11:06 AM     IRON:  No results found for: IRON  LIPASE: Lab Results   Component Value Date/Time    LIPASE 23 04/12/2019 04:20 PM       ASSESSMENT AND PLAN:      Patient Active Problem List    Diagnosis Date Noted    Atrial fibrillation with rapid ventricular response ([facility] 11/28/2022    Moderate protein-calorie malnutrition ([facility] 04/13/2019    Pneumonia 04/12/2019    Acute renal failure (ARF) ([facility] [address] [facility] 05/09/2012    Pyelonephritis 05/09/2012    Obstructive uropathy 05/09/2012    Kidney stone 05/09/2012    HTN (hypertension)     Hyperlipidemia     Obstructive sleep apnea     Glaucoma     DJD (degenerative joint disease)     Morbid obesity ([facility] [address] [facility]      Impression:  1. New onset atrial fibrillation with RVR-paroxysmal atrial fibs-patient converted to sinus rhythm with treatment with beta-blocker  2. CHF per chest x-ray which probably related to atrial fib with RVR  3. Positive COVID-19 infection-CRP only 2.5  4. Acute hypoxic respiratory failure-most likely related to #2 and 3, 6  5. History hypertension  6. History of GLENNA  7. Morbid obesity  8. History DVT-right lower extremity with vena caval filter placement 1999  9. History of hyperlipidemia  10.   Diarrhea-possibly related to COVID    Plan:  Discharge home today    Prescription for atenolol 25 mg daily  Prescription for Decadron 6 mg daily for 5 days  Prescription for Eliquis 5 mg twice daily    Decrease amlodipine 2.5 mg p.o. daily    Continue other home meds    Follow-up with primary care physician in 1 week    Follow-up with cardiology-Dr. Minal Mccray, DO, D.O.  11/30/2022  12:52 PM

## 2022-11-30 NOTE — PROCEDURES
[address]                                 ECHOCARDIOGRAM    PATIENT NAME: Jaya Schilling                  :        1940  MED REC NO:   [id number]                            ROOM:       0618  ACCOUNT NO:   [de-identified]                           ADMIT DATE: 2022  PROVIDER:     Baudilio Shankar MD      ECHOCARDIOGRAPHER:  Cricket Back    INDICATIONS:  Paroxysmal atrial fibrillation, acute COVID-19 infection,  acute respiratory failure, congestive heart failure. 2-D MEASUREMENTS:  Right ventricle diastole 2.5 cm, left ventricle  diastole 5.4, systole 3.7. Septal and posterior wall thickness of the  left ventricle 1.3 cm, left atrial dimension 4.5 cm, left atrial area 26  cm2. Right atrial area 18 cm2. IMPRESSION:  1. This was a limited quality echocardiogram due to the patient's body  habitus, extreme morbid obesity and acute COVID infection. COVID  protocol was followed. Accuracy will be affected. The left atrium is  dilated at 26 cm2. The remaining cardiac chambers including aortic root  appeared to be within normal limits. 2.  The left ventricle shows mild concentric left ventricular  hypertrophy at 1.3 cm. Systolic function is well preserved, ejection  fraction 64%. There is no focal wall motion abnormality. There is  pseudo normal diastolic filling dysfunction seen. 3.  The mitral valve shows dense mitral annular calcification. There is  no significant mitral stenosis with a maximal gradient of 6.7 mmHg  across the mitral valve. Mitral valve area by pressure half time 2.5  cm2. There is only trace mitral regurgitation. No mitral stenosis. 4.  The aortic valve is calcified, sclerotic and mildly stenotic. Maximal gradient 22 mmHg, mean gradient 13. On visualization coupled  with the gradients, there is mild calcific aortic stenosis with no  aortic insufficiency.   5. There is no pulmonic stenosis nor insufficiency. There is trace  tricuspid regurgitation only, top normal pulmonary pressures at 29.4  mmHg. 6.  There is no significant pericardial effusion nor any definite  intracavitary mass or thrombus or shunt identified on this study. However, again this study was technically difficult and accuracy will be  affected as above.         Kisha Huggins MD    D: 11/29/2022 23:41:43       T: 11/29/2022 23:43:59     [id number]  Job#: [id number]     Doc#: [id number]    CC:  DO Farhana Banuelos MD

## 2022-12-02 LAB
EKG ATRIAL RATE: 68 BPM
EKG P AXIS: 79 DEGREES
EKG P-R INTERVAL: 158 MS
EKG Q-T INTERVAL: 454 MS
EKG QRS DURATION: 92 MS
EKG QTC CALCULATION (BAZETT): 482 MS
EKG R AXIS: 66 DEGREES
EKG T AXIS: 66 DEGREES
EKG VENTRICULAR RATE: 68 BPM

## 2022-12-09 ENCOUNTER — HOSPITAL ENCOUNTER (INPATIENT)
Age: 82
LOS: 6 days | Discharge: NURSING, REHAB OR LONG-TERM CARE | DRG: 871 | End: 2022-12-15
Attending: EMERGENCY MEDICINE | Admitting: INTERNAL MEDICINE
Payer: MEDICARE

## 2022-12-09 ENCOUNTER — APPOINTMENT (OUTPATIENT)
Dept: GENERAL RADIOLOGY | Age: 82
DRG: 871 | End: 2022-12-09
Payer: MEDICARE

## 2022-12-09 DIAGNOSIS — N39.0 ACUTE UTI: ICD-10-CM

## 2022-12-09 DIAGNOSIS — J96.01 ACUTE RESPIRATORY FAILURE WITH HYPOXIA (HCC): ICD-10-CM

## 2022-12-09 DIAGNOSIS — J18.9 PNEUMONIA DUE TO INFECTIOUS ORGANISM, UNSPECIFIED LATERALITY, UNSPECIFIED PART OF LUNG: Primary | ICD-10-CM

## 2022-12-09 DIAGNOSIS — I48.91 ATRIAL FIBRILLATION WITH RVR (HCC): ICD-10-CM

## 2022-12-09 LAB
ALBUMIN SERPL-MCNC: 3.2 G/DL (ref 3.5–5.2)
ALP BLD-CCNC: 165 U/L (ref 35–104)
ALT SERPL-CCNC: 30 U/L (ref 0–32)
ANION GAP SERPL CALCULATED.3IONS-SCNC: 16 MMOL/L (ref 7–16)
AST SERPL-CCNC: 27 U/L (ref 0–31)
BACTERIA: ABNORMAL /HPF
BASOPHILS ABSOLUTE: 0 E9/L (ref 0–0.2)
BASOPHILS RELATIVE PERCENT: 0 % (ref 0–2)
BILIRUB SERPL-MCNC: 1.7 MG/DL (ref 0–1.2)
BILIRUBIN URINE: NEGATIVE
BLOOD, URINE: ABNORMAL
BUN BLDV-MCNC: 57 MG/DL (ref 6–23)
BURR CELLS: ABNORMAL
CALCIUM SERPL-MCNC: 9.9 MG/DL (ref 8.6–10.2)
CHLORIDE BLD-SCNC: 100 MMOL/L (ref 98–107)
CLARITY: CLEAR
CO2: 23 MMOL/L (ref 22–29)
COLOR: ABNORMAL
CREAT SERPL-MCNC: 1.3 MG/DL (ref 0.5–1)
EOSINOPHILS ABSOLUTE: 0.51 E9/L (ref 0.05–0.5)
EOSINOPHILS RELATIVE PERCENT: 2 % (ref 0–6)
EPITHELIAL CELLS, UA: ABNORMAL /HPF
GFR SERPL CREATININE-BSD FRML MDRD: 41 ML/MIN/1.73
GLUCOSE BLD-MCNC: 120 MG/DL (ref 74–99)
GLUCOSE URINE: NEGATIVE MG/DL
HCT VFR BLD CALC: 50.5 % (ref 34–48)
HEMOGLOBIN: 16.8 G/DL (ref 11.5–15.5)
HYALINE CASTS: ABNORMAL /LPF (ref 0–2)
INFLUENZA A BY PCR: NOT DETECTED
INFLUENZA B BY PCR: NOT DETECTED
KETONES, URINE: NEGATIVE MG/DL
LEUKOCYTE ESTERASE, URINE: NEGATIVE
LYMPHOCYTES ABSOLUTE: 0.51 E9/L (ref 1.5–4)
LYMPHOCYTES RELATIVE PERCENT: 2 % (ref 20–42)
MCH RBC QN AUTO: 29.3 PG (ref 26–35)
MCHC RBC AUTO-ENTMCNC: 33.3 % (ref 32–34.5)
MCV RBC AUTO: 88 FL (ref 80–99.9)
MONOCYTES ABSOLUTE: 1.26 E9/L (ref 0.1–0.95)
MONOCYTES RELATIVE PERCENT: 5 % (ref 2–12)
NEUTROPHILS ABSOLUTE: 23.02 E9/L (ref 1.8–7.3)
NEUTROPHILS RELATIVE PERCENT: 91 % (ref 43–80)
NITRITE, URINE: POSITIVE
OVALOCYTES: ABNORMAL
PDW BLD-RTO: 14 FL (ref 11.5–15)
PH UA: 5.5 (ref 5–9)
PLATELET # BLD: 462 E9/L (ref 130–450)
PMV BLD AUTO: 10 FL (ref 7–12)
POIKILOCYTES: ABNORMAL
POLYCHROMASIA: ABNORMAL
POTASSIUM REFLEX MAGNESIUM: 4 MMOL/L (ref 3.5–5)
PRO-BNP: 3533 PG/ML (ref 0–450)
PROTEIN UA: ABNORMAL MG/DL
RBC # BLD: 5.74 E12/L (ref 3.5–5.5)
RBC UA: ABNORMAL /HPF (ref 0–2)
SARS-COV-2, NAAT: DETECTED
SODIUM BLD-SCNC: 139 MMOL/L (ref 132–146)
SPECIFIC GRAVITY UA: 1.02 (ref 1–1.03)
TOTAL CK: 53 U/L (ref 20–180)
TOTAL PROTEIN: 7.5 G/DL (ref 6.4–8.3)
TROPONIN, HIGH SENSITIVITY: 44 NG/L (ref 0–9)
TROPONIN, HIGH SENSITIVITY: 45 NG/L (ref 0–9)
UROBILINOGEN, URINE: 1 E.U./DL
WBC # BLD: 25.3 E9/L (ref 4.5–11.5)
WBC UA: ABNORMAL /HPF (ref 0–5)

## 2022-12-09 PROCEDURE — 2500000003 HC RX 250 WO HCPCS: Performed by: STUDENT IN AN ORGANIZED HEALTH CARE EDUCATION/TRAINING PROGRAM

## 2022-12-09 PROCEDURE — 6360000002 HC RX W HCPCS: Performed by: STUDENT IN AN ORGANIZED HEALTH CARE EDUCATION/TRAINING PROGRAM

## 2022-12-09 PROCEDURE — 6370000000 HC RX 637 (ALT 250 FOR IP): Performed by: STUDENT IN AN ORGANIZED HEALTH CARE EDUCATION/TRAINING PROGRAM

## 2022-12-09 PROCEDURE — 83880 ASSAY OF NATRIURETIC PEPTIDE: CPT

## 2022-12-09 PROCEDURE — 2580000003 HC RX 258: Performed by: STUDENT IN AN ORGANIZED HEALTH CARE EDUCATION/TRAINING PROGRAM

## 2022-12-09 PROCEDURE — 2060000000 HC ICU INTERMEDIATE R&B

## 2022-12-09 PROCEDURE — 84145 PROCALCITONIN (PCT): CPT

## 2022-12-09 PROCEDURE — 81001 URINALYSIS AUTO W/SCOPE: CPT

## 2022-12-09 PROCEDURE — 99285 EMERGENCY DEPT VISIT HI MDM: CPT

## 2022-12-09 PROCEDURE — 93005 ELECTROCARDIOGRAM TRACING: CPT | Performed by: STUDENT IN AN ORGANIZED HEALTH CARE EDUCATION/TRAINING PROGRAM

## 2022-12-09 PROCEDURE — 82550 ASSAY OF CK (CPK): CPT

## 2022-12-09 PROCEDURE — 36415 COLL VENOUS BLD VENIPUNCTURE: CPT

## 2022-12-09 PROCEDURE — 87635 SARS-COV-2 COVID-19 AMP PRB: CPT

## 2022-12-09 PROCEDURE — 82728 ASSAY OF FERRITIN: CPT

## 2022-12-09 PROCEDURE — 85025 COMPLETE CBC W/AUTO DIFF WBC: CPT

## 2022-12-09 PROCEDURE — 87150 DNA/RNA AMPLIFIED PROBE: CPT

## 2022-12-09 PROCEDURE — 80053 COMPREHEN METABOLIC PANEL: CPT

## 2022-12-09 PROCEDURE — 87449 NOS EACH ORGANISM AG IA: CPT

## 2022-12-09 PROCEDURE — 96374 THER/PROPH/DIAG INJ IV PUSH: CPT

## 2022-12-09 PROCEDURE — 2580000003 HC RX 258: Performed by: INTERNAL MEDICINE

## 2022-12-09 PROCEDURE — 85384 FIBRINOGEN ACTIVITY: CPT

## 2022-12-09 PROCEDURE — 94644 CONT INHLJ TX 1ST HOUR: CPT

## 2022-12-09 PROCEDURE — 87502 INFLUENZA DNA AMP PROBE: CPT

## 2022-12-09 PROCEDURE — 86140 C-REACTIVE PROTEIN: CPT

## 2022-12-09 PROCEDURE — 71045 X-RAY EXAM CHEST 1 VIEW: CPT

## 2022-12-09 PROCEDURE — 84484 ASSAY OF TROPONIN QUANT: CPT

## 2022-12-09 PROCEDURE — 87040 BLOOD CULTURE FOR BACTERIA: CPT

## 2022-12-09 RX ORDER — LATANOPROST 50 UG/ML
1 SOLUTION/ DROPS OPHTHALMIC NIGHTLY
Status: DISCONTINUED | OUTPATIENT
Start: 2022-12-09 | End: 2022-12-15 | Stop reason: HOSPADM

## 2022-12-09 RX ORDER — ACETAMINOPHEN 650 MG/1
650 SUPPOSITORY RECTAL EVERY 6 HOURS PRN
Status: DISCONTINUED | OUTPATIENT
Start: 2022-12-09 | End: 2022-12-15 | Stop reason: HOSPADM

## 2022-12-09 RX ORDER — ACETAMINOPHEN 325 MG/1
650 TABLET ORAL EVERY 6 HOURS PRN
Status: DISCONTINUED | OUTPATIENT
Start: 2022-12-09 | End: 2022-12-15 | Stop reason: HOSPADM

## 2022-12-09 RX ORDER — ASPIRIN 81 MG/1
81 TABLET ORAL DAILY
Status: DISCONTINUED | OUTPATIENT
Start: 2022-12-10 | End: 2022-12-15 | Stop reason: HOSPADM

## 2022-12-09 RX ORDER — LEVOTHYROXINE SODIUM 88 UG/1
88 TABLET ORAL DAILY
Status: DISCONTINUED | OUTPATIENT
Start: 2022-12-10 | End: 2022-12-15 | Stop reason: HOSPADM

## 2022-12-09 RX ORDER — SODIUM CHLORIDE 9 MG/ML
INJECTION, SOLUTION INTRAVENOUS PRN
Status: DISCONTINUED | OUTPATIENT
Start: 2022-12-09 | End: 2022-12-15 | Stop reason: HOSPADM

## 2022-12-09 RX ORDER — SODIUM CHLORIDE 0.9 % (FLUSH) 0.9 %
5-40 SYRINGE (ML) INJECTION PRN
Status: DISCONTINUED | OUTPATIENT
Start: 2022-12-09 | End: 2022-12-15 | Stop reason: HOSPADM

## 2022-12-09 RX ORDER — METOPROLOL TARTRATE 5 MG/5ML
5 INJECTION INTRAVENOUS EVERY 5 MIN PRN
Status: DISCONTINUED | OUTPATIENT
Start: 2022-12-09 | End: 2022-12-15 | Stop reason: HOSPADM

## 2022-12-09 RX ORDER — ISOSORBIDE MONONITRATE 30 MG/1
30 TABLET, EXTENDED RELEASE ORAL DAILY
Status: DISCONTINUED | OUTPATIENT
Start: 2022-12-10 | End: 2022-12-15 | Stop reason: HOSPADM

## 2022-12-09 RX ORDER — DEXTROSE MONOHYDRATE 100 MG/ML
INJECTION, SOLUTION INTRAVENOUS CONTINUOUS PRN
Status: DISCONTINUED | OUTPATIENT
Start: 2022-12-09 | End: 2022-12-15 | Stop reason: HOSPADM

## 2022-12-09 RX ORDER — SODIUM CHLORIDE 0.9 % (FLUSH) 0.9 %
5-40 SYRINGE (ML) INJECTION EVERY 12 HOURS SCHEDULED
Status: DISCONTINUED | OUTPATIENT
Start: 2022-12-09 | End: 2022-12-15 | Stop reason: HOSPADM

## 2022-12-09 RX ORDER — PANTOPRAZOLE SODIUM 40 MG/1
40 TABLET, DELAYED RELEASE ORAL
Status: DISCONTINUED | OUTPATIENT
Start: 2022-12-10 | End: 2022-12-15 | Stop reason: HOSPADM

## 2022-12-09 RX ORDER — IPRATROPIUM BROMIDE AND ALBUTEROL SULFATE 2.5; .5 MG/3ML; MG/3ML
3 SOLUTION RESPIRATORY (INHALATION) ONCE
Status: COMPLETED | OUTPATIENT
Start: 2022-12-09 | End: 2022-12-09

## 2022-12-09 RX ORDER — DULOXETIN HYDROCHLORIDE 30 MG/1
30 CAPSULE, DELAYED RELEASE ORAL DAILY
Status: DISCONTINUED | OUTPATIENT
Start: 2022-12-10 | End: 2022-12-15 | Stop reason: HOSPADM

## 2022-12-09 RX ORDER — DEXAMETHASONE SODIUM PHOSPHATE 10 MG/ML
6 INJECTION INTRAMUSCULAR; INTRAVENOUS EVERY 24 HOURS
Status: DISCONTINUED | OUTPATIENT
Start: 2022-12-10 | End: 2022-12-15 | Stop reason: HOSPADM

## 2022-12-09 RX ORDER — POLYETHYLENE GLYCOL 3350 17 G/17G
17 POWDER, FOR SOLUTION ORAL DAILY PRN
Status: DISCONTINUED | OUTPATIENT
Start: 2022-12-09 | End: 2022-12-15 | Stop reason: HOSPADM

## 2022-12-09 RX ORDER — DEXAMETHASONE SODIUM PHOSPHATE 10 MG/ML
10 INJECTION INTRAMUSCULAR; INTRAVENOUS ONCE
Status: COMPLETED | OUTPATIENT
Start: 2022-12-09 | End: 2022-12-09

## 2022-12-09 RX ORDER — ATENOLOL 50 MG/1
25 TABLET ORAL DAILY
Status: DISCONTINUED | OUTPATIENT
Start: 2022-12-10 | End: 2022-12-15 | Stop reason: HOSPADM

## 2022-12-09 RX ADMIN — Medication 10 ML: at 22:36

## 2022-12-09 RX ADMIN — DEXAMETHASONE SODIUM PHOSPHATE 10 MG: 10 INJECTION INTRAMUSCULAR; INTRAVENOUS at 17:11

## 2022-12-09 RX ADMIN — CEFTRIAXONE 2000 MG: 2 INJECTION, POWDER, FOR SOLUTION INTRAMUSCULAR; INTRAVENOUS at 20:12

## 2022-12-09 RX ADMIN — DOXYCYCLINE 100 MG: 100 INJECTION, POWDER, LYOPHILIZED, FOR SOLUTION INTRAVENOUS at 21:22

## 2022-12-09 RX ADMIN — IPRATROPIUM BROMIDE AND ALBUTEROL SULFATE 3 AMPULE: .5; 2.5 SOLUTION RESPIRATORY (INHALATION) at 16:35

## 2022-12-09 ASSESSMENT — ENCOUNTER SYMPTOMS
SHORTNESS OF BREATH: 0
COLOR CHANGE: 0
ABDOMINAL PAIN: 0
DIARRHEA: 0
NAUSEA: 0
VOMITING: 0
COUGH: 1
BACK PAIN: 0

## 2022-12-09 ASSESSMENT — LIFESTYLE VARIABLES
HOW OFTEN DO YOU HAVE A DRINK CONTAINING ALCOHOL: NEVER
HOW OFTEN DO YOU HAVE A DRINK CONTAINING ALCOHOL: NEVER

## 2022-12-09 NOTE — ED PROVIDER NOTES
HPI   28-year-old female patient presenting to emergency department for evaluation of generalized fatigue. She was diagnosed with COVID on 11/28. She lives at home with her . She has been having generalized weakness and been unable to get out of her chair for the last 3 days. Patient stating that she has been urinating on herself, her  is too weak to help her out. She is complaining of no chest pain, shortness of breath, vomiting, diarrhea or headaches. She states that she has been coughing and it has been persistent. Symptoms are moderate in severity not better or worse with anything. She is on Eliquis and she has been compliant with it. Review of Systems   Constitutional:  Positive for fatigue. Negative for chills and fever. HENT:  Negative for congestion. Respiratory:  Positive for cough. Negative for shortness of breath. Cardiovascular:  Negative for chest pain. Gastrointestinal:  Negative for abdominal pain, diarrhea, nausea and vomiting. Genitourinary:  Negative for difficulty urinating, dysuria and hematuria. Musculoskeletal:  Negative for back pain. Skin:  Negative for color change. All other systems reviewed and are negative. Physical Exam  Vitals and nursing note reviewed. Constitutional:       Appearance: Normal appearance. HENT:      Head: Normocephalic and atraumatic. Nose: Nose normal. No congestion. Mouth/Throat:      Mouth: Mucous membranes are moist.      Pharynx: Oropharynx is clear. Eyes:      Conjunctiva/sclera: Conjunctivae normal.      Pupils: Pupils are equal, round, and reactive to light. Cardiovascular:      Rate and Rhythm: Tachycardia present. Rhythm irregular. Pulses: Normal pulses. Heart sounds: Normal heart sounds. Pulmonary:      Comments: Bilateral wheezing throughout  Abdominal:      General: Bowel sounds are normal. There is no distension. Tenderness: There is no abdominal tenderness. Musculoskeletal:         General: Normal range of motion. Cervical back: Normal range of motion and neck supple. Comments: Bilateral lower extremity edema   Skin:     General: Skin is warm and dry. Capillary Refill: Capillary refill takes less than 2 seconds. Neurological:      General: No focal deficit present. Mental Status: She is alert. Procedures     MDM  27-year-old female here for evaluation of generalized weakness. No electrolyte abnormalities or renal dysfunction. Troponin here is 44 and repeat is 45. EKG stable. patient COVID-positive as of 11/28, has mild hypoxia here requiring nasal cannula oxygen. Patient previously had no oxygen requirement. Nitrite positive urine, white count of 25 and right upper lobe pneumonia. At this time patient given rocephin and doxycycline. Plan to admit patient. EKG: This EKG is signed and interpreted by me.     Rate: 131  Rhythm: Rapid ventricular response  Interpretation: afib rvr  Comparison: stable as compared to patient's most recent EKG       ED Course as of 12/10/22 0211   Fri Dec 09, 2022   1956 Spoke with Dr. Daniel Marroquin asked to admit to Dr. Jessica Sorenson that she was admitted to him last time she was here [FG]   36 [facility] with Dr. Jose Mcclain will admit for Dr. Jessica Sorenson [FG]      ED Course User Index  [FG] Noelle De Leon DO     ED Course as of 12/10/22 0211   Fri Dec 09, 2022   1956 Spoke with Dr. Daniel Marroquin asked to admit to Dr. Jessica Sorenson that she was admitted to him last time she was here [FG]   36 [facility] with Dr. Jose Mcclain will admit for Dr. Jessica Sorenson [FG]      ED Course User Index  [FG] Noelle De Leon DO       --------------------------------------------- PAST HISTORY ---------------------------------------------  Past Medical History:  has a past medical history of Cataract, Cholecystitis with cholelithiasis, Diverticulitis, Diverticulosis, DJD (degenerative joint disease), DVT (deep venous thrombosis) ([facility] Fibroids, Glaucoma, HTN (hypertension), Hyperlipidemia, Morbid obesity ([facility] GLENNA on CPAP, Osteoarthritis, PE (pulmonary embolism), and Renal cyst.    Past Surgical History:  has a past surgical history that includes Cataract removal with implant (12/2011); Total abdominal hysterectomy w/ bilateral salpingoophorectomy (1990); Cholecystectomy, laparoscopic (1995); Vena Cava Filter Placement (1999); Hysterectomy; Colonoscopy; Upper gastrointestinal endoscopy; Cystocopy (jun 2012); and pr glaucoma surg,trabecu ab externo (Right, 11/15/2018). Social History:  reports that she quit smoking about 31 years ago. She has a 30.00 pack-year smoking history. She has never used smokeless tobacco. She reports that she does not drink alcohol and does not use drugs. Family History: family history includes COPD in her father; Cancer in her brother; Coronary Art Dis in her father; Heart Attack (age of onset: 62) in her mother. The patients home medications have been reviewed.     Allergies: Pcn [penicillins] and Noroxin [norfloxacin]    -------------------------------------------------- RESULTS -------------------------------------------------    LABS:  Results for orders placed or performed during the hospital encounter of 12/09/22   COVID-19, Rapid    Specimen: Nasopharyngeal Swab   Result Value Ref Range    SARS-CoV-2, NAAT DETECTED (A) Not Detected   Rapid influenza A/B antigens    Specimen: Nasopharyngeal   Result Value Ref Range    Influenza A by PCR Not Detected Not Detected    Influenza B by PCR Not Detected Not Detected   CBC with Auto Differential   Result Value Ref Range    WBC 25.3 (H) 4.5 - 11.5 E9/L    RBC 5.74 (H) 3.50 - 5.50 E12/L    Hemoglobin 16.8 (H) 11.5 - 15.5 g/dL    Hematocrit 50.5 (H) 34.0 - 48.0 %    MCV 88.0 80.0 - 99.9 fL    MCH 29.3 26.0 - 35.0 pg    MCHC 33.3 32.0 - 34.5 %    RDW 14.0 11.5 - 15.0 fL    Platelets 794 (H) 462 - 450 E9/L    MPV 10.0 7.0 - 12.0 fL    Neutrophils % 91.0 (H) 43.0 - 80.0 %    Lymphocytes % 2.0 (L) 20.0 - 42.0 %    Monocytes % 5.0 2.0 - 12.0 %    Eosinophils % 2.0 0.0 - 6.0 %    Basophils % 0.0 0.0 - 2.0 %    Neutrophils Absolute 23.02 (H) 1.80 - 7.30 E9/L    Lymphocytes Absolute 0.51 (L) 1.50 - 4.00 E9/L    Monocytes Absolute 1.26 (H) 0.10 - 0.95 E9/L    Eosinophils Absolute 0.51 (H) 0.05 - 0.50 E9/L    Basophils Absolute 0.00 0.00 - 0.20 E9/L    Polychromasia 1+     Poikilocytes 2+     Debbie Cells 2+     Ovalocytes 1+    Comprehensive Metabolic Panel w/ Reflex to MG   Result Value Ref Range    Sodium 139 132 - 146 mmol/L    Potassium reflex Magnesium 4.0 3.5 - 5.0 mmol/L    Chloride 100 98 - 107 mmol/L    CO2 23 22 - 29 mmol/L    Anion Gap 16 7 - 16 mmol/L    Glucose 120 (H) 74 - 99 mg/dL    BUN 57 (H) 6 - 23 mg/dL    Creatinine 1.3 (H) 0.5 - 1.0 mg/dL    Est, Glom Filt Rate 41 >=60 mL/min/1.73    Calcium 9.9 8.6 - 10.2 mg/dL    Total Protein 7.5 6.4 - 8.3 g/dL    Albumin 3.2 (L) 3.5 - 5.2 g/dL    Total Bilirubin 1.7 (H) 0.0 - 1.2 mg/dL    Alkaline Phosphatase 165 (H) 35 - 104 U/L    ALT 30 0 - 32 U/L    AST 27 0 - 31 U/L   Troponin   Result Value Ref Range    Troponin, High Sensitivity 44 (H) 0 - 9 ng/L   Brain Natriuretic Peptide   Result Value Ref Range    Pro-BNP 3,533 (H) 0 - 450 pg/mL   Urinalysis with Microscopic   Result Value Ref Range    Color, UA DKYELLOW Straw/Yellow    Clarity, UA Clear Clear    Glucose, Ur Negative Negative mg/dL    Bilirubin Urine Negative Negative    Ketones, Urine Negative Negative mg/dL    Specific Gravity, UA 1.025 1.005 - 1.030    Blood, Urine TRACE (A) Negative    pH, UA 5.5 5.0 - 9.0    Protein, UA TRACE Negative mg/dL    Urobilinogen, Urine 1.0 <2.0 E.U./dL    Nitrite, Urine POSITIVE (A) Negative    Leukocyte Esterase, Urine Negative Negative    Hyaline Casts, UA 0-2 0 - 2 /LPF    WBC, UA 0-1 0 - 5 /HPF    RBC, UA 0-1 0 - 2 /HPF    Epithelial Cells, UA RARE /HPF    Bacteria, UA FEW (A) None Seen /HPF   CK   Result Value Ref Range    Total CK 53 20 - 180 U/L   Fibrinogen Result Value Ref Range    Fibrinogen >700 (H) 200 - 400 mg/dL   Troponin   Result Value Ref Range    Troponin, High Sensitivity 45 (H) 0 - 9 ng/L   EKG 12 Lead   Result Value Ref Range    Ventricular Rate 131 BPM    Atrial Rate 115 BPM    QRS Duration 94 ms    Q-T Interval 270 ms    QTc Calculation (Bazett) 398 ms    R Axis 33 degrees    T Axis -150 degrees       RADIOLOGY:  XR CHEST PORTABLE   Final Result   Right upper lobe opacity worrisome for pneumonia.             ------------------------- NURSING NOTES AND VITALS REVIEWED ---------------------------  Date / Time Roomed:  12/9/2022  3:16 PM  ED Bed Assignment:  10/10    The nursing notes within the ED encounter and vital signs as below have been reviewed.      Patient Vitals for the past 24 hrs:   BP Temp Temp src Pulse Resp SpO2 Weight   12/10/22 0100 (!) 125/91 -- -- (!) 140 28 96 % --   12/10/22 0058 -- -- -- -- -- -- (!) 343 lb (155.6 kg)   12/10/22 0000 130/88 -- -- (!) 131 21 94 % --   12/09/22 2322 (!) 114/95 -- -- (!) 128 30 96 % --   12/09/22 2200 (!) 121/98 -- -- (!) 161 12 95 % --   12/09/22 2130 (!) 172/160 -- -- (!) 129 20 96 % --   12/09/22 2030 (!) 132/108 -- -- (!) 108 -- 94 % --   12/09/22 2000 (!) 128/104 -- -- (!) 115 17 94 % --   12/09/22 1900 (!) 166/140 -- -- (!) 122 25 94 % --   12/09/22 1800 (!) 124/92 -- -- (!) 136 15 93 % --   12/09/22 1730 (!) 139/116 -- -- (!) 117 21 94 % --   12/09/22 1700 110/83 -- -- (!) 107 18 94 % --   12/09/22 1549 -- -- -- (!) 132 20 95 % --   12/09/22 1543 (!) 149/110 97.8 °F (36.6 °C) Oral (!) 128 26 90 % --   12/09/22 1541 -- -- -- -- 22 (!) 88 % --       Oxygen Saturation Interpretation: Abnormal and Improved after treatment    ------------------------------------------ PROGRESS NOTES ------------------------------------------  Counseling:  I have spoken with the patient and discussed todays results, in addition to providing specific details for the plan of care and counseling regarding the diagnosis and prognosis. Their questions are answered at this time and they are agreeable with the plan of admission.    --------------------------------- ADDITIONAL PROVIDER NOTES ---------------------------------    This patient has remained hemodynamically stable during their ED course. Diagnosis:  1. Pneumonia due to infectious organism, unspecified laterality, unspecified part of lung    2. Acute respiratory failure with hypoxia (HCC)    3. Acute UTI    4. Atrial fibrillation with RVR (HCC)        Disposition:  Patient's disposition: Admit to telemetry  Patient's condition is stable.            Poornima Hairston DO  Resident  12/10/22 1144

## 2022-12-09 NOTE — LETTER
[place] Department Medicaid  CERTIFICATION OF NECESSITY  FOR NON-EMERGENCY TRANSPORTATION   BY GROUND AMBULANCE      Individual Information   1. Name: Sendy Montiel 2. [place] Medicaid Billing Number:      3. Address: [address]  [facility] [address]      Transportation Provider Information   4. Provider Name:   [facility]    5. [place] Medicaid Provider Number:  National Provider Identifier (NPI):      Certification  7. Criteria:  During transport, this individual requires:  [x] Medical treatment or continuous     supervision by an EMT. [] The administration or regulation of oxygen by another person. [] Supervised protective restraint. 8. Period Beginning Date: 12/14/22   9. Length  [x] Not more than 1 day(s)  [] One Year     Additional Information Relevant to Certification   10. Comments or Explanations, If Necessary or Appropriate     COVID positive, fall risk, unable to stand fully or unassisted, poor strength and endurance, unable to sit for transport. ampac score 8. Pnuemonia, 2-3L O2. Atrial fib. Certifying Practitioner Information   11. Name of Practitioner: Dr Kellie Duggan    12. [place] Medicaid Provider Number, If Applicable:  [address] Provider Identifier (NPI):      Signature Information   14. Date of Signature: 12/14/22  15. Name of Person Signing:  Chrystal EPRRY    16.  Signature and Professional Designation:  Electronically signed by VICKY Matta on 12/14/2022 at 4:08 PM     ODM 16165  Rev. 7/2015

## 2022-12-10 LAB
ALBUMIN SERPL-MCNC: 2.5 G/DL (ref 3.5–5.2)
ALP BLD-CCNC: 109 U/L (ref 35–104)
ALT SERPL-CCNC: 24 U/L (ref 0–32)
ANION GAP SERPL CALCULATED.3IONS-SCNC: 15 MMOL/L (ref 7–16)
APTT: 47.7 SEC (ref 24.5–35.1)
AST SERPL-CCNC: 21 U/L (ref 0–31)
BASOPHILS ABSOLUTE: 0.23 E9/L (ref 0–0.2)
BASOPHILS RELATIVE PERCENT: 1 % (ref 0–2)
BILIRUB SERPL-MCNC: 1 MG/DL (ref 0–1.2)
BUN BLDV-MCNC: 55 MG/DL (ref 6–23)
C-REACTIVE PROTEIN: 11.1 MG/DL (ref 0–0.4)
C-REACTIVE PROTEIN: 7.2 MG/DL (ref 0–0.4)
CALCIUM SERPL-MCNC: 9.5 MG/DL (ref 8.6–10.2)
CHLORIDE BLD-SCNC: 107 MMOL/L (ref 98–107)
CO2: 20 MMOL/L (ref 22–29)
CREAT SERPL-MCNC: 1 MG/DL (ref 0.5–1)
D DIMER: 319 NG/ML DDU
EOSINOPHILS ABSOLUTE: 0 E9/L (ref 0.05–0.5)
EOSINOPHILS RELATIVE PERCENT: 0 % (ref 0–6)
FERRITIN: 612 NG/ML
FIBRINOGEN: 562 MG/DL (ref 200–400)
FIBRINOGEN: >700 MG/DL (ref 200–400)
GFR SERPL CREATININE-BSD FRML MDRD: 56 ML/MIN/1.73
GLUCOSE BLD-MCNC: 123 MG/DL (ref 74–99)
HCT VFR BLD CALC: 46.2 % (ref 34–48)
HEMOGLOBIN: 15.7 G/DL (ref 11.5–15.5)
INR BLD: 1.9
LACTATE DEHYDROGENASE: 302 U/L (ref 135–214)
LACTIC ACID: 1.9 MMOL/L (ref 0.5–2.2)
LYMPHOCYTES ABSOLUTE: 2.03 E9/L (ref 1.5–4)
LYMPHOCYTES RELATIVE PERCENT: 9 % (ref 20–42)
MAGNESIUM: 2.4 MG/DL (ref 1.6–2.6)
MCH RBC QN AUTO: 30.1 PG (ref 26–35)
MCHC RBC AUTO-ENTMCNC: 34 % (ref 32–34.5)
MCV RBC AUTO: 88.7 FL (ref 80–99.9)
MONOCYTES ABSOLUTE: 0.68 E9/L (ref 0.1–0.95)
MONOCYTES RELATIVE PERCENT: 3 % (ref 2–12)
NEUTROPHILS ABSOLUTE: 19.58 E9/L (ref 1.8–7.3)
NEUTROPHILS RELATIVE PERCENT: 87 % (ref 43–80)
PDW BLD-RTO: 14.1 FL (ref 11.5–15)
PHOSPHORUS: 5.1 MG/DL (ref 2.5–4.5)
PLATELET # BLD: 426 E9/L (ref 130–450)
PMV BLD AUTO: 9.9 FL (ref 7–12)
POTASSIUM SERPL-SCNC: 4.1 MMOL/L (ref 3.5–5)
PROCALCITONIN: 0.07 NG/ML (ref 0–0.08)
PROCALCITONIN: 0.08 NG/ML (ref 0–0.08)
PROTHROMBIN TIME: 21.8 SEC (ref 9.3–12.4)
RBC # BLD: 5.21 E12/L (ref 3.5–5.5)
SODIUM BLD-SCNC: 142 MMOL/L (ref 132–146)
T4 FREE: 1.33 NG/DL (ref 0.93–1.7)
TOTAL PROTEIN: 6.6 G/DL (ref 6.4–8.3)
TROPONIN, HIGH SENSITIVITY: 42 NG/L (ref 0–9)
TSH SERPL DL<=0.05 MIU/L-ACNC: 0.4 UIU/ML (ref 0.27–4.2)
VITAMIN D 25-HYDROXY: 43 NG/ML (ref 30–100)
VITAMIN D 25-HYDROXY: 44 NG/ML (ref 30–100)
WBC # BLD: 22.5 E9/L (ref 4.5–11.5)

## 2022-12-10 PROCEDURE — 2580000003 HC RX 258: Performed by: INTERNAL MEDICINE

## 2022-12-10 PROCEDURE — 84145 PROCALCITONIN (PCT): CPT

## 2022-12-10 PROCEDURE — 2060000000 HC ICU INTERMEDIATE R&B

## 2022-12-10 PROCEDURE — 85384 FIBRINOGEN ACTIVITY: CPT

## 2022-12-10 PROCEDURE — 82306 VITAMIN D 25 HYDROXY: CPT

## 2022-12-10 PROCEDURE — 6370000000 HC RX 637 (ALT 250 FOR IP): Performed by: INTERNAL MEDICINE

## 2022-12-10 PROCEDURE — 80053 COMPREHEN METABOLIC PANEL: CPT

## 2022-12-10 PROCEDURE — 2500000003 HC RX 250 WO HCPCS: Performed by: INTERNAL MEDICINE

## 2022-12-10 PROCEDURE — 2700000000 HC OXYGEN THERAPY PER DAY

## 2022-12-10 PROCEDURE — 86140 C-REACTIVE PROTEIN: CPT

## 2022-12-10 PROCEDURE — 6360000002 HC RX W HCPCS: Performed by: INTERNAL MEDICINE

## 2022-12-10 PROCEDURE — 84439 ASSAY OF FREE THYROXINE: CPT

## 2022-12-10 PROCEDURE — 84484 ASSAY OF TROPONIN QUANT: CPT

## 2022-12-10 PROCEDURE — 85730 THROMBOPLASTIN TIME PARTIAL: CPT

## 2022-12-10 PROCEDURE — 84100 ASSAY OF PHOSPHORUS: CPT

## 2022-12-10 PROCEDURE — 83735 ASSAY OF MAGNESIUM: CPT

## 2022-12-10 PROCEDURE — 94660 CPAP INITIATION&MGMT: CPT

## 2022-12-10 PROCEDURE — 85025 COMPLETE CBC W/AUTO DIFF WBC: CPT

## 2022-12-10 PROCEDURE — 85610 PROTHROMBIN TIME: CPT

## 2022-12-10 PROCEDURE — 84443 ASSAY THYROID STIM HORMONE: CPT

## 2022-12-10 PROCEDURE — XW0DXM6 INTRODUCTION OF BARICITINIB INTO MOUTH AND PHARYNX, EXTERNAL APPROACH, NEW TECHNOLOGY GROUP 6: ICD-10-PCS | Performed by: PHYSICAL THERAPIST

## 2022-12-10 PROCEDURE — 85378 FIBRIN DEGRADE SEMIQUANT: CPT

## 2022-12-10 PROCEDURE — 83605 ASSAY OF LACTIC ACID: CPT

## 2022-12-10 PROCEDURE — 83615 LACTATE (LD) (LDH) ENZYME: CPT

## 2022-12-10 PROCEDURE — 94640 AIRWAY INHALATION TREATMENT: CPT

## 2022-12-10 RX ORDER — ASCORBIC ACID 500 MG
1000 TABLET ORAL DAILY
Status: DISCONTINUED | OUTPATIENT
Start: 2022-12-10 | End: 2022-12-15 | Stop reason: HOSPADM

## 2022-12-10 RX ORDER — KETOTIFEN FUMARATE 0.35 MG/ML
1 SOLUTION/ DROPS OPHTHALMIC 2 TIMES DAILY
Status: DISCONTINUED | OUTPATIENT
Start: 2022-12-10 | End: 2022-12-15 | Stop reason: HOSPADM

## 2022-12-10 RX ORDER — ATORVASTATIN CALCIUM 10 MG/1
10 TABLET, FILM COATED ORAL DAILY
Status: DISCONTINUED | OUTPATIENT
Start: 2022-12-10 | End: 2022-12-15 | Stop reason: HOSPADM

## 2022-12-10 RX ORDER — BRIMONIDINE TARTRATE 2 MG/ML
1 SOLUTION/ DROPS OPHTHALMIC 2 TIMES DAILY
Status: DISCONTINUED | OUTPATIENT
Start: 2022-12-10 | End: 2022-12-15 | Stop reason: HOSPADM

## 2022-12-10 RX ORDER — CLONIDINE HYDROCHLORIDE 0.1 MG/1
0.05 TABLET ORAL 2 TIMES DAILY
Status: DISCONTINUED | OUTPATIENT
Start: 2022-12-10 | End: 2022-12-15 | Stop reason: HOSPADM

## 2022-12-10 RX ORDER — LANOLIN ALCOHOL/MO/W.PET/CERES
50 CREAM (GRAM) TOPICAL DAILY
Status: DISCONTINUED | OUTPATIENT
Start: 2022-12-10 | End: 2022-12-15 | Stop reason: HOSPADM

## 2022-12-10 RX ORDER — ZINC SULFATE 50(220)MG
50 CAPSULE ORAL DAILY
Status: DISCONTINUED | OUTPATIENT
Start: 2022-12-10 | End: 2022-12-15 | Stop reason: HOSPADM

## 2022-12-10 RX ORDER — DORZOLAMIDE HCL 20 MG/ML
1 SOLUTION/ DROPS OPHTHALMIC 2 TIMES DAILY
Status: DISCONTINUED | OUTPATIENT
Start: 2022-12-10 | End: 2022-12-15 | Stop reason: HOSPADM

## 2022-12-10 RX ADMIN — PYRIDOXINE HCL TAB 50 MG 50 MG: 50 TAB at 18:31

## 2022-12-10 RX ADMIN — ATORVASTATIN CALCIUM 10 MG: 10 TABLET, FILM COATED ORAL at 18:31

## 2022-12-10 RX ADMIN — OXYCODONE HYDROCHLORIDE AND ACETAMINOPHEN 1000 MG: 500 TABLET ORAL at 18:31

## 2022-12-10 RX ADMIN — PANTOPRAZOLE SODIUM 40 MG: 40 TABLET, DELAYED RELEASE ORAL at 12:33

## 2022-12-10 RX ADMIN — ATENOLOL 25 MG: 50 TABLET ORAL at 12:32

## 2022-12-10 RX ADMIN — IPRATROPIUM BROMIDE AND ALBUTEROL 1 PUFF: 20; 100 SPRAY, METERED RESPIRATORY (INHALATION) at 21:22

## 2022-12-10 RX ADMIN — CEFTRIAXONE 2000 MG: 2 INJECTION, POWDER, FOR SOLUTION INTRAMUSCULAR; INTRAVENOUS at 21:05

## 2022-12-10 RX ADMIN — APIXABAN 5 MG: 5 TABLET, FILM COATED ORAL at 21:06

## 2022-12-10 RX ADMIN — METOPROLOL TARTRATE 5 MG: 5 INJECTION, SOLUTION INTRAVENOUS at 12:35

## 2022-12-10 RX ADMIN — DOXYCYCLINE 100 MG: 100 INJECTION, POWDER, LYOPHILIZED, FOR SOLUTION INTRAVENOUS at 21:18

## 2022-12-10 RX ADMIN — ANTI-FUNGAL POWDER MICONAZOLE NITRATE TALC FREE: 1.42 POWDER TOPICAL at 21:49

## 2022-12-10 RX ADMIN — DORZOLAMIDE HYDROCHLORIDE 1 DROP: 20 SOLUTION/ DROPS OPHTHALMIC at 21:12

## 2022-12-10 RX ADMIN — LATANOPROST 1 DROP: 50 SOLUTION OPHTHALMIC at 01:46

## 2022-12-10 RX ADMIN — ASPIRIN 81 MG: 81 TABLET, COATED ORAL at 12:32

## 2022-12-10 RX ADMIN — METOPROLOL TARTRATE 5 MG: 5 INJECTION, SOLUTION INTRAVENOUS at 10:01

## 2022-12-10 RX ADMIN — LATANOPROST 1 DROP: 50 SOLUTION OPHTHALMIC at 21:13

## 2022-12-10 RX ADMIN — BRIMONIDINE TARTRATE 1 DROP: 2 SOLUTION OPHTHALMIC at 21:05

## 2022-12-10 RX ADMIN — APIXABAN 5 MG: 5 TABLET, FILM COATED ORAL at 01:45

## 2022-12-10 RX ADMIN — CLONIDINE HYDROCHLORIDE 0.05 MG: 0.1 TABLET ORAL at 21:00

## 2022-12-10 RX ADMIN — KETOTIFEN FUMARATE 1 DROP: 0.25 SOLUTION/ DROPS OPHTHALMIC at 21:09

## 2022-12-10 RX ADMIN — BARICITINIB 2 MG: 2 TABLET, FILM COATED ORAL at 18:31

## 2022-12-10 RX ADMIN — APIXABAN 5 MG: 5 TABLET, FILM COATED ORAL at 12:32

## 2022-12-10 RX ADMIN — ZINC SULFATE 220 MG (50 MG) CAPSULE 50 MG: CAPSULE at 18:31

## 2022-12-10 RX ADMIN — IPRATROPIUM BROMIDE AND ALBUTEROL 1 PUFF: 20; 100 SPRAY, METERED RESPIRATORY (INHALATION) at 16:05

## 2022-12-10 RX ADMIN — ISOSORBIDE MONONITRATE 30 MG: 30 TABLET, EXTENDED RELEASE ORAL at 12:31

## 2022-12-10 RX ADMIN — DEXAMETHASONE SODIUM PHOSPHATE 6 MG: 10 INJECTION INTRAMUSCULAR; INTRAVENOUS at 10:01

## 2022-12-10 RX ADMIN — Medication 10 ML: at 21:08

## 2022-12-10 ASSESSMENT — PAIN SCALES - GENERAL
PAINLEVEL_OUTOF10: 0

## 2022-12-10 ASSESSMENT — PAIN - FUNCTIONAL ASSESSMENT: PAIN_FUNCTIONAL_ASSESSMENT: ACTIVITIES ARE NOT PREVENTED

## 2022-12-10 NOTE — ED NOTES
Spoke to lab about the T-spot TB test, can't draw over the weekend.       Sheila Shi, RN  12/09/22 0217

## 2022-12-10 NOTE — PROGRESS NOTES
I spoke with Black Caldera and he stated that he made arrangements and that Estrella Peraza would be coming to get the pt soon.     Vipul Bill RN

## 2022-12-10 NOTE — ED NOTES
Patient is tolerating off bipap well. She has been alert and appropriate. She was able to get am medications caught up. No signs of swallowing difficulties noted. Gave lunch tray and is going to attempt some regular foods. Will continue to monitor.      Rod Bar RN  12/10/22 [id number]

## 2022-12-10 NOTE — PROGRESS NOTES
Department of Internal Medicine  PN    PCP: Pedrito Kirk DO  Admitting Physician: Dr. Oneida Mishra  Consultants:   Date of Service: 12/9/2022    CHIEF COMPLAINT:  weakness    HISTORY OF PRESENT ILLNESS:    Patient is an 42-year-old female who presented to the ED with generalized weakness and fatigue for the last few days. Patient denies any shortness of breath or cough. She denies any subjective fever or chills. She denies any dysuria. patient did obtain the COVID-19 vaccination    12/10/2022  Patient seen examined on monitored floor. Patient's  is at the bedside and case discussed. Patient thinks she feels a little bit better but still very very weak. Creatinine improving with currently BUN/creatinine 55/1.0 with a creatinine 1.3 on admission. Transaminases normal with WBC still elevated 22.5 with hemoglobin 15.7. CRP is improved to 7.2 with D-dimer 319. Temperature 97.7 with a heart rate 92 with blood pressure 140/113. O2 sat 96% on 6 L nasal cannula. Patient still tachycardic. We will put her back on her clonidine but switch to oral form.     PAST MEDICAL Hx:  Past Medical History:   Diagnosis Date    Cataract     right eye    Cholecystitis with cholelithiasis     Diverticulitis     Diverticulosis     DJD (degenerative joint disease)     DVT (deep venous thrombosis) (HCC)     RLE    Fibroids     Glaucoma     HTN (hypertension)     Hyperlipidemia     states on med for precaution    Morbid obesity ([address]     GLENNA on CPAP     Osteoarthritis     PE (pulmonary embolism)     Renal cyst     right       PAST SURGICAL Hx:   Past Surgical History:   Procedure Laterality Date    CATARACT REMOVAL WITH IMPLANT  12/2011    left eye    CHOLECYSTECTOMY, LAPAROSCOPIC  1995    COLONOSCOPY      CYSTOSCOPY  jun 2012    cystoscopy, right ureteroscopy with retrograde pyelograms, stone manipulation, stent insertion, laser lithotripsy with holmium laser    HYSTERECTOMY (CERVIX STATUS UNKNOWN)      CO GLAUCOMA SURG,TRABECU AB EXTERNO Right 11/15/2018    RIGHT EYE REVISION OF XEN EYE STENT, TRABECULECTOMY performed by Patrick Jackson MD at [facility] (CERVIX REMOVED)      UPPER GASTROINTESTINAL ENDOSCOPY      [address],[facility]    for RLE DVT       FAMILY Hx:  Family History   Problem Relation Age of Onset    Cancer Brother          - lung cancer    COPD Father     Coronary Art Dis Father     Heart Attack Mother 62        sudden death       HOME MEDICATIONS:  Prior to Admission medications    Medication Sig Start Date End Date Taking?  Authorizing Provider   atenolol (TENORMIN) 25 MG tablet Take 1 tablet by mouth daily 22   Chavo South, DO   ascorbic acid (VITAMIN C) 1000 MG tablet Take 1 tablet by mouth daily 22   Chavo South, DO   apixaban (ELIQUIS) 5 MG TABS tablet Take 1 tablet by mouth 2 times daily 22   Chavo South, DO   potassium citrate (UROCIT-K) 10 MEQ (1080 MG) extended release tablet Take by mouth daily    Historical Provider, MD   DULoxetine (CYMBALTA) 30 MG extended release capsule Take 30 mg by mouth daily    Historical Provider, MD   isosorbide mononitrate (IMDUR) 30 MG extended release tablet Take 30 mg by mouth daily    Historical Provider, MD   zinc gluconate 50 MG tablet Take 50 mg by mouth daily    Historical Provider, MD   Boswellia-Glucosamine-Vit D (OSTEO BI-FLEX ONE PER DAY PO) Take 1 tablet by mouth daily    Historical Provider, MD   sodium chloride (CANDY 128) 5 % ophthalmic solution Place 1 drop into both eyes 4 times daily    Historical Provider, MD   latanoprost (XALATAN) 0.005 % ophthalmic solution Place 1 drop into the left eye nightly    Historical Provider, MD   dorzolamide (TRUSOPT) 2 % ophthalmic solution Place 1 drop into the left eye in the morning and at bedtime    Historical Provider, MD   brimonidine (ALPHAGAN) 0.2 % ophthalmic solution Place 1 drop into both eyes in the morning and at bedtime    Historical Provider, MD   olopatadine (PATADAY) 0.2 % SOLN ophthalmic solution Place 1 drop into both eyes daily    Historical Provider, MD   levothyroxine (SYNTHROID) 88 MCG tablet Take 1 tablet by mouth Daily 4/24/19   Dunia Valentine DO   pantoprazole (PROTONIX) 40 MG tablet Take 1 tablet by mouth every morning (before breakfast) 4/24/19   Dunia Valentine DO   oxybutynin (DITROPAN) 5 MG tablet Take 1 tablet by mouth 2 times daily 4/23/19   Dunia Valentine DO   hydrocortisone 2.5 % cream Apply topically 2 times daily. Patient taking differently: Apply 1 application topically 2 times daily Apply topically 2 times daily to where skin rash noted 4/23/19   Dunia Valentine DO   Psyllium (METAMUCIL FIBER PO) Take by mouth 2 times daily    Historical Provider, MD   aspirin 81 MG EC tablet Take 81 mg by mouth daily Pt instructed to check hold with dr. Jace Abarca Provider, MD   [facility] OIL Take 1,000 mg by mouth daily Ld 11/9/2018  Patient not taking: Reported on 11/28/2022    Historical Provider, MD   Docusate Sodium (DOCULASE PO) Take 100 mg by mouth in the morning and at bedtime    Historical Provider, MD   amlodipine (NORVASC) 2.5 MG tablet Take 1 tablet by mouth daily. 5/9/12   Dunia Valentine DO   clonidine (CATAPRES) 0.3 MG/24HR Place 1 patch onto the skin once a week Indications: saturday     Historical Provider, MD   atorvastatin (LIPITOR) 10 MG tablet Take 10 mg by mouth daily. Historical Provider, MD   nitroGLYCERIN (NITROSTAT) 0.4 MG SL tablet Place 0.4 mg under the tongue every 5 minutes as needed.       Historical Provider, MD       ALLERGIES:  Pcn [penicillins] and Noroxin [norfloxacin]    SOCIAL Hx:  Social History     Socioeconomic History    Marital status:      Spouse name: Not on file    Number of children: Not on file    Years of education: Not on file    Highest education level: Not on file   Occupational History    Occupation: parminder     Comment: retired 1985   Tobacco Use    Smoking status: Former     Packs/day: 1.00     Years: 30.00     Pack years: 30.00     Types: Cigarettes     Quit date: 1991     Years since quittin.6    Smokeless tobacco: Never   Vaping Use    Vaping Use: Never used   Substance and Sexual Activity    Alcohol use: No    Drug use: No    Sexual activity: Not on file   Other Topics Concern    Not on file   Social History Narrative    Not on file     Social Determinants of Health     Financial Resource Strain: Not on file   Food Insecurity: Not on file   Transportation Needs: Not on file   Physical Activity: Not on file   Stress: Not on file   Social Connections: Not on file   Intimate Partner Violence: Not on file   Housing Stability: Not on file       ROS: Positive in bold  General:   Denies chills, fatigue, fever, malaise, night sweats or weight loss    Psychological:   Denies anxiety, disorientation or hallucinations    ENT:    Denies epistaxis, headaches, vertigo or visual changes    Cardiovascular:   Denies any chest pain, irregular heartbeats, or palpitations. No paroxysmal nocturnal dyspnea. Respiratory:   Denies shortness of breath, coughing, sputum production, hemoptysis, or wheezing. No orthopnea. Gastrointestinal:   Denies nausea, vomiting, diarrhea, or constipation. Denies any abdominal pain. Denies change in bowel habits or stools. Genito-Urinary:    Denies any urgency, frequency, hematuria. Voiding without difficulty. Musculoskeletal:   Denies joint pain, joint stiffness, joint swelling or muscle pain    Neurology:    Denies any headache or focal neurological deficits. No weakness or paresthesia. Derm:    Denies any rashes, ulcers, or excoriations. Denies bruising. Extremities:   Denies any lower extremity swelling or edema.       PHYSICAL EXAM: Abnormal findings noted  VITALS:  Vitals:    12/10/22 1245   BP:    Pulse: (!) 122   Resp: 26   Temp:    SpO2: 95%         CONSTITUTIONAL:    Awake, alert, cooperative, no apparent distress, and appears stated age    EYES:    EOMI, sclera clear, conjunctiva normal    ENT:    Normocephalic, atraumatic, External ears without lesions. NECK:    Supple, symmetrical, trachea midline, no JVD    HEMATOLOGIC/LYMPHATICS:    No cervical lymphadenopathy and no supraclavicular lymphadenopathy    LUNGS:    Symmetric. No increased work of breathing, good air exchange, clear to auscultation bilaterally, no wheezes, rhonchi, or rales,   Patient has coarse breath sounds and rhonchi bilaterally    CARDIOVASCULAR:    Normal apical impulse, regular rate and rhythm, normal S1 and S2, no S3 or S4, and no murmur noted    ABDOMEN:    soft, non-distended, non-tender    MUSCULOSKELETAL:    There is no redness, warmth, or swelling of the joints. NEUROLOGIC:    Awake, alert, oriented to name, place and time. SKIN:    No bruising or bleeding. No redness, warmth, or swelling    EXTREMITIES:    Peripheral pulses present. No edema, cyanosis, or swelling.     LINES/CATHETERS     LABORATORY DATA:  CBC with Differential:    Lab Results   Component Value Date/Time    WBC 22.5 12/10/2022 07:17 AM    RBC 5.21 12/10/2022 07:17 AM    HGB 15.7 12/10/2022 07:17 AM    HCT 46.2 12/10/2022 07:17 AM     12/10/2022 07:17 AM    MCV 88.7 12/10/2022 07:17 AM    MCH 30.1 12/10/2022 07:17 AM    MCHC 34.0 12/10/2022 07:17 AM    RDW 14.1 12/10/2022 07:17 AM    SEGSPCT 55 05/24/2012 02:10 PM    LYMPHOPCT 9.0 12/10/2022 07:17 AM    MONOPCT 3.0 12/10/2022 07:17 AM    MYELOPCT 1 03/27/2012 12:50 PM    BASOPCT 1.0 12/10/2022 07:17 AM    MONOSABS 0.68 12/10/2022 07:17 AM    LYMPHSABS 2.03 12/10/2022 07:17 AM    EOSABS 0.00 12/10/2022 07:17 AM    BASOSABS 0.23 12/10/2022 07:17 AM     CMP:    Lab Results   Component Value Date/Time     12/10/2022 07:17 AM    K 4.1 12/10/2022 07:17 AM    K 4.0 12/09/2022 04:29 PM     12/10/2022 07:17 AM    CO2 20 12/10/2022 07:17 AM    BUN 55 12/10/2022 07:17 AM    CREATININE 1.0 12/10/2022 07:17 AM    GFRAA >60 04/23/2019 06:15 AM LABGLOM 56 12/10/2022 07:17 AM    GLUCOSE 123 12/10/2022 07:17 AM    GLUCOSE 105 05/24/2012 02:10 PM    PROT 6.6 12/10/2022 07:17 AM    LABALBU 2.5 12/10/2022 07:17 AM    LABALBU 3.9 05/24/2012 02:10 PM    CALCIUM 9.5 12/10/2022 07:17 AM    BILITOT 1.0 12/10/2022 07:17 AM    ALKPHOS 109 12/10/2022 07:17 AM    AST 21 12/10/2022 07:17 AM    ALT 24 12/10/2022 07:17 AM       ASSESSMENT/PLAN:  Acute hypoxic respiratory failure  COVID-19  Pneumonia  Mild aortic stenosis  Pulmonary hypertension  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 Patient found to to be acutely hypoxic. Patient x-ray revealed pneumonia. COVID-19 came back positive. Patient states that she has been vaccinated. Patient placed on IV steroids as well as Combivent. Patient started on IV antibiotics as well. Patient counseled on taking deep breaths and incentive spirometry use. Continue to monitor pulse ox and wean off supplemental oxygen as tolerated. Home medication reviewed  Consult pharmacy for COVID protocol  Combivent Respimat 1 puff 4 times daily  Decadron 6 mg IV push daily  IV Rocephin 1 g daily  Doxycycline 100 mg IV piggyback twice daily    BMP, CBC in a.[email or web address]       [facility]  3:01 PM  12/10/2022

## 2022-12-10 NOTE — H&P
Department of Internal Medicine  History and Physical    PCP: María Elena Corral DO  Admitting Physician: Dr. Liam Clay  Consultants:   Date of Service: 2022    CHIEF COMPLAINT:  weakness    HISTORY OF PRESENT ILLNESS:    Patient is an 80-year-old female who presented to the ED with generalized weakness and fatigue for the last few days. Patient denies any shortness of breath or cough. She denies any subjective fever or chills. She denies any dysuria. patient did obtain the COVID-19 vaccination    PAST MEDICAL Hx:  Past Medical History:   Diagnosis Date    Cataract     right eye    Cholecystitis with cholelithiasis     Diverticulitis     Diverticulosis     DJD (degenerative joint disease)     DVT (deep venous thrombosis) ([address]     RLE    Fibroids     Glaucoma     HTN (hypertension)     Hyperlipidemia     states on med for precaution    Morbid obesity ([address]     GLENNA on CPAP     Osteoarthritis     PE (pulmonary embolism)     Renal cyst     right       PAST SURGICAL Hx:   Past Surgical History:   Procedure Laterality Date    CATARACT REMOVAL WITH IMPLANT  2011    left eye    CHOLECYSTECTOMY, LAPAROSCOPIC      COLONOSCOPY      CYSTOSCOPY  2012    cystoscopy, right ureteroscopy with retrograde pyelograms, stone manipulation, stent insertion, laser lithotripsy with holmium laser    HYSTERECTOMY (CERVIX STATUS UNKNOWN)      CO GLAUCOMA SURG,TRABECU AB EXTERNO Right 11/15/2018    RIGHT EYE REVISION OF XEN EYE STENT, TRABECULECTOMY performed by Justa Shaw MD at [facility] (CERVIX REMOVED)  [facility]    for RLE DVT       FAMILY Hx:  Family History   Problem Relation Age of Onset    Cancer Brother          - lung cancer    COPD Father     Coronary Art Dis Father     Heart Attack Mother 62        sudden death       HOME MEDICATIONS:  Prior to Admission medications    Medication Sig Start Date End Date Taking?  Authorizing Provider atenolol (TENORMIN) 25 MG tablet Take 1 tablet by mouth daily 12/1/22   Tomi Chavez,    ascorbic acid (VITAMIN C) 1000 MG tablet Take 1 tablet by mouth daily 12/1/22   Tomi Chavez,    apixaban (ELIQUIS) 5 MG TABS tablet Take 1 tablet by mouth 2 times daily 11/30/22   Tomi Chavez,    potassium citrate (UROCIT-K) 10 MEQ (1080 MG) extended release tablet Take by mouth daily    Historical Provider, MD   DULoxetine (CYMBALTA) 30 MG extended release capsule Take 30 mg by mouth daily    Historical Provider, MD   isosorbide mononitrate (IMDUR) 30 MG extended release tablet Take 30 mg by mouth daily    Historical Provider, MD   zinc gluconate 50 MG tablet Take 50 mg by mouth daily    Historical Provider, MD   Boswellia-Glucosamine-Vit D (OSTEO BI-FLEX ONE PER DAY PO) Take 1 tablet by mouth daily    Historical Provider, MD   sodium chloride (CANDY 128) 5 % ophthalmic solution Place 1 drop into both eyes 4 times daily    Historical Provider, MD   latanoprost (XALATAN) 0.005 % ophthalmic solution Place 1 drop into the left eye nightly    Historical Provider, MD   dorzolamide (TRUSOPT) 2 % ophthalmic solution Place 1 drop into the left eye in the morning and at bedtime    Historical Provider, MD   brimonidine (ALPHAGAN) 0.2 % ophthalmic solution Place 1 drop into both eyes in the morning and at bedtime    Historical Provider, MD   olopatadine (PATADAY) 0.2 % SOLN ophthalmic solution Place 1 drop into both eyes daily    Historical Provider, MD   levothyroxine (SYNTHROID) 88 MCG tablet Take 1 tablet by mouth Daily 4/24/19   Aliya Pulido DO   pantoprazole (PROTONIX) 40 MG tablet Take 1 tablet by mouth every morning (before breakfast) 4/24/19   Aliya Pulido DO   oxybutynin (DITROPAN) 5 MG tablet Take 1 tablet by mouth 2 times daily 4/23/19   Aliya Pulido DO   hydrocortisone 2.5 % cream Apply topically 2 times daily.   Patient taking differently: Apply 1 application topically 2 times daily Apply topically 2 times daily to where skin rash noted 19   Nunu Ferris DO   Psyllium (METAMUCIL FIBER PO) Take by mouth 2 times daily    Historical Provider, MD   aspirin 81 MG EC tablet Take 81 mg by mouth daily Pt instructed to check hold with dr. Gutierrez Smoker Provider, MD   [facility] OIL Take 1,000 mg by mouth daily Ld 2018  Patient not taking: Reported on 2022    Historical Provider, MD   Docusate Sodium (DOCULASE PO) Take 100 mg by mouth in the morning and at bedtime    Historical Provider, MD   amlodipine (NORVASC) 2.5 MG tablet Take 1 tablet by mouth daily. 12   Nunu Ferris DO   clonidine (CATAPRES) 0.3 MG/24HR Place 1 patch onto the skin once a week Indications: saturday     Historical Provider, MD   atorvastatin (LIPITOR) 10 MG tablet Take 10 mg by mouth daily. Historical Provider, MD   nitroGLYCERIN (NITROSTAT) 0.4 MG SL tablet Place 0.4 mg under the tongue every 5 minutes as needed.       Historical Provider, MD       ALLERGIES:  Pcn [penicillins] and Noroxin [norfloxacin]    SOCIAL Hx:  Social History     Socioeconomic History    Marital status:      Spouse name: Not on file    Number of children: Not on file    Years of education: Not on file    Highest education level: Not on file   Occupational History    Occupation: parminder     Comment: retired    Tobacco Use    Smoking status: Former     Packs/day: 1.00     Years: 30.00     Pack years: 30.00     Types: Cigarettes     Quit date: 1991     Years since quittin.6    Smokeless tobacco: Never   Vaping Use    Vaping Use: Never used   Substance and Sexual Activity    Alcohol use: No    Drug use: No    Sexual activity: Not on file   Other Topics Concern    Not on file   Social History Narrative    Not on file     Social Determinants of Health     Financial Resource Strain: Not on file   Food Insecurity: Not on file   Transportation Needs: Not on file   Physical Activity: Not on file   Stress: Not on file   Social Connections: Not on file   Intimate Partner Violence: Not on file   Housing Stability: Not on file       ROS: Positive in bold  General:   Denies chills, fatigue, fever, malaise, night sweats or weight loss    Psychological:   Denies anxiety, disorientation or hallucinations    ENT:    Denies epistaxis, headaches, vertigo or visual changes    Cardiovascular:   Denies any chest pain, irregular heartbeats, or palpitations. No paroxysmal nocturnal dyspnea. Respiratory:   Denies shortness of breath, coughing, sputum production, hemoptysis, or wheezing. No orthopnea. Gastrointestinal:   Denies nausea, vomiting, diarrhea, or constipation. Denies any abdominal pain. Denies change in bowel habits or stools. Genito-Urinary:    Denies any urgency, frequency, hematuria. Voiding without difficulty. Musculoskeletal:   Denies joint pain, joint stiffness, joint swelling or muscle pain    Neurology:    Denies any headache or focal neurological deficits. No weakness or paresthesia. Derm:    Denies any rashes, ulcers, or excoriations. Denies bruising. Extremities:   Denies any lower extremity swelling or edema. PHYSICAL EXAM: Abnormal findings noted  VITALS:  Vitals:    12/09/22 1700   BP: 110/83   Pulse: (!) 107   Resp: 18   Temp:    SpO2: 94%         CONSTITUTIONAL:    Awake, alert, cooperative, no apparent distress, and appears stated age    EYES:    EOMI, sclera clear, conjunctiva normal    ENT:    Normocephalic, atraumatic, External ears without lesions. NECK:    Supple, symmetrical, trachea midline, no JVD    HEMATOLOGIC/LYMPHATICS:    No cervical lymphadenopathy and no supraclavicular lymphadenopathy    LUNGS:    Symmetric.  No increased work of breathing, good air exchange, clear to auscultation bilaterally, no wheezes, rhonchi, or rales,   Patient has coarse breath sounds and rhonchi bilaterally    CARDIOVASCULAR:    Normal apical impulse, regular rate and rhythm, normal S1 and S2, no S3 or S4, and no murmur noted    ABDOMEN:    soft, non-distended, non-tender    MUSCULOSKELETAL:    There is no redness, warmth, or swelling of the joints. NEUROLOGIC:    Awake, alert, oriented to name, place and time. SKIN:    No bruising or bleeding. No redness, warmth, or swelling    EXTREMITIES:    Peripheral pulses present. No edema, cyanosis, or swelling.     LINES/CATHETERS     LABORATORY DATA:  CBC with Differential:    Lab Results   Component Value Date/Time    WBC 25.3 12/09/2022 04:29 PM    RBC 5.74 12/09/2022 04:29 PM    HGB 16.8 12/09/2022 04:29 PM    HCT 50.5 12/09/2022 04:29 PM     12/09/2022 04:29 PM    MCV 88.0 12/09/2022 04:29 PM    MCH 29.3 12/09/2022 04:29 PM    MCHC 33.3 12/09/2022 04:29 PM    RDW 14.0 12/09/2022 04:29 PM    SEGSPCT 55 05/24/2012 02:10 PM    LYMPHOPCT 2.0 12/09/2022 04:29 PM    MONOPCT 5.0 12/09/2022 04:29 PM    MYELOPCT 1 03/27/2012 12:50 PM    BASOPCT 0.0 12/09/2022 04:29 PM    MONOSABS 1.26 12/09/2022 04:29 PM    LYMPHSABS 0.51 12/09/2022 04:29 PM    EOSABS 0.51 12/09/2022 04:29 PM    BASOSABS 0.00 12/09/2022 04:29 PM     CMP:    Lab Results   Component Value Date/Time     12/09/2022 04:29 PM    K 4.0 12/09/2022 04:29 PM     12/09/2022 04:29 PM    CO2 23 12/09/2022 04:29 PM    BUN 57 12/09/2022 04:29 PM    CREATININE 1.3 12/09/2022 04:29 PM    GFRAA >60 04/23/2019 06:15 AM    LABGLOM 41 12/09/2022 04:29 PM    GLUCOSE 120 12/09/2022 04:29 PM    GLUCOSE 105 05/24/2012 02:10 PM    PROT 7.5 12/09/2022 04:29 PM    LABALBU 3.2 12/09/2022 04:29 PM    LABALBU 3.9 05/24/2012 02:10 PM    CALCIUM 9.9 12/09/2022 04:29 PM    BILITOT 1.7 12/09/2022 04:29 PM    ALKPHOS 165 12/09/2022 04:29 PM    AST 27 12/09/2022 04:29 PM    ALT 30 12/09/2022 04:29 PM       ASSESSMENT/PLAN:  Acute hypoxic respiratory failure  COVID-19  Pneumonia  Mild aortic stenosis  Pulmonary hypertension  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 Patient found to to be acutely hypoxic. Patient x-ray revealed pneumonia. COVID-19 came back positive. Patient states that she has been vaccinated. Patient placed on IV steroids as well as Combivent. Patient started on IV antibiotics as well. Patient counseled on taking deep breaths and incentive spirometry use. Continue to monitor pulse ox and wean off supplemental oxygen as tolerated.     Jr Glynn [address] [place] [address]  7:55 PM  12/9/2022    Electronically signed by Jr Glynn DO on 12/9/22 at 7:55 PM EST

## 2022-12-10 NOTE — PROGRESS NOTES
Pharmacy consulted for the benefit of covid medications. Patient currently on high-flow nasal cannula. Was recently taken off BiPAP. Patient has a history of PE/VTE in the past and takes eliquis for this as well as stroke prevention with atrial fibrillation. Biomarkers elevated with crp = 7.3. Will initiate renally dosed baricitinib 2 mg daily for GFR 56. This medication carries risks of increased coagulation but she is being continued on her home eliquis now and benefits should outweigh risks.        Jacques Salamanca, PharmD 12/10/2022 3:39 PM   [phone]

## 2022-12-11 LAB
ALBUMIN SERPL-MCNC: 2.8 G/DL (ref 3.5–5.2)
ALP BLD-CCNC: 88 U/L (ref 35–104)
ALT SERPL-CCNC: 30 U/L (ref 0–32)
ANION GAP SERPL CALCULATED.3IONS-SCNC: 14 MMOL/L (ref 7–16)
AST SERPL-CCNC: 36 U/L (ref 0–31)
BILIRUB SERPL-MCNC: 0.7 MG/DL (ref 0–1.2)
BUN BLDV-MCNC: 45 MG/DL (ref 6–23)
CALCIUM SERPL-MCNC: 9.2 MG/DL (ref 8.6–10.2)
CHLORIDE BLD-SCNC: 107 MMOL/L (ref 98–107)
CO2: 21 MMOL/L (ref 22–29)
CREAT SERPL-MCNC: 0.9 MG/DL (ref 0.5–1)
EKG ATRIAL RATE: 115 BPM
EKG Q-T INTERVAL: 270 MS
EKG QRS DURATION: 94 MS
EKG QTC CALCULATION (BAZETT): 398 MS
EKG R AXIS: 33 DEGREES
EKG T AXIS: -150 DEGREES
EKG VENTRICULAR RATE: 131 BPM
GFR SERPL CREATININE-BSD FRML MDRD: >60 ML/MIN/1.73
GLUCOSE BLD-MCNC: 118 MG/DL (ref 74–99)
L. PNEUMOPHILA SEROGP 1 UR AG: NORMAL
POTASSIUM SERPL-SCNC: 4.9 MMOL/L (ref 3.5–5)
SODIUM BLD-SCNC: 142 MMOL/L (ref 132–146)
STREP PNEUMONIAE ANTIGEN, URINE: NORMAL
TOTAL PROTEIN: 5.8 G/DL (ref 6.4–8.3)

## 2022-12-11 PROCEDURE — 2060000000 HC ICU INTERMEDIATE R&B

## 2022-12-11 PROCEDURE — 6370000000 HC RX 637 (ALT 250 FOR IP): Performed by: INTERNAL MEDICINE

## 2022-12-11 PROCEDURE — 94640 AIRWAY INHALATION TREATMENT: CPT

## 2022-12-11 PROCEDURE — 6360000002 HC RX W HCPCS: Performed by: INTERNAL MEDICINE

## 2022-12-11 PROCEDURE — 36415 COLL VENOUS BLD VENIPUNCTURE: CPT

## 2022-12-11 PROCEDURE — 93010 ELECTROCARDIOGRAM REPORT: CPT | Performed by: INTERNAL MEDICINE

## 2022-12-11 PROCEDURE — 80053 COMPREHEN METABOLIC PANEL: CPT

## 2022-12-11 PROCEDURE — 2580000003 HC RX 258: Performed by: INTERNAL MEDICINE

## 2022-12-11 PROCEDURE — 2700000000 HC OXYGEN THERAPY PER DAY

## 2022-12-11 PROCEDURE — 51702 INSERT TEMP BLADDER CATH: CPT

## 2022-12-11 RX ORDER — SODIUM CHLORIDE 0.9 % (FLUSH) 0.9 %
5-40 SYRINGE (ML) INJECTION PRN
Status: DISCONTINUED | OUTPATIENT
Start: 2022-12-11 | End: 2022-12-15 | Stop reason: HOSPADM

## 2022-12-11 RX ORDER — SODIUM CHLORIDE 9 MG/ML
INJECTION, SOLUTION INTRAVENOUS PRN
Status: DISCONTINUED | OUTPATIENT
Start: 2022-12-11 | End: 2022-12-15 | Stop reason: HOSPADM

## 2022-12-11 RX ORDER — BENZONATATE 100 MG/1
100 CAPSULE ORAL 3 TIMES DAILY
Status: DISCONTINUED | OUTPATIENT
Start: 2022-12-11 | End: 2022-12-15 | Stop reason: HOSPADM

## 2022-12-11 RX ORDER — GUAIFENESIN/DEXTROMETHORPHAN 100-10MG/5
10 SYRUP ORAL EVERY 4 HOURS PRN
Status: DISCONTINUED | OUTPATIENT
Start: 2022-12-11 | End: 2022-12-15 | Stop reason: HOSPADM

## 2022-12-11 RX ORDER — SODIUM CHLORIDE 0.9 % (FLUSH) 0.9 %
5-40 SYRINGE (ML) INJECTION EVERY 12 HOURS SCHEDULED
Status: DISCONTINUED | OUTPATIENT
Start: 2022-12-11 | End: 2022-12-13 | Stop reason: SDUPTHER

## 2022-12-11 RX ORDER — HEPARIN SODIUM (PORCINE) LOCK FLUSH IV SOLN 100 UNIT/ML 100 UNIT/ML
1 SOLUTION INTRAVENOUS PRN
Status: DISCONTINUED | OUTPATIENT
Start: 2022-12-11 | End: 2022-12-15 | Stop reason: HOSPADM

## 2022-12-11 RX ORDER — HEPARIN SODIUM (PORCINE) LOCK FLUSH IV SOLN 100 UNIT/ML 100 UNIT/ML
1 SOLUTION INTRAVENOUS EVERY 12 HOURS SCHEDULED
Status: DISCONTINUED | OUTPATIENT
Start: 2022-12-11 | End: 2022-12-15 | Stop reason: HOSPADM

## 2022-12-11 RX ADMIN — PYRIDOXINE HCL TAB 50 MG 50 MG: 50 TAB at 09:10

## 2022-12-11 RX ADMIN — Medication 10 ML: at 20:13

## 2022-12-11 RX ADMIN — Medication 10 ML: at 09:13

## 2022-12-11 RX ADMIN — KETOTIFEN FUMARATE 1 DROP: 0.25 SOLUTION/ DROPS OPHTHALMIC at 20:13

## 2022-12-11 RX ADMIN — APIXABAN 5 MG: 5 TABLET, FILM COATED ORAL at 20:06

## 2022-12-11 RX ADMIN — BENZONATATE 100 MG: 100 CAPSULE ORAL at 14:36

## 2022-12-11 RX ADMIN — DORZOLAMIDE HYDROCHLORIDE 1 DROP: 20 SOLUTION/ DROPS OPHTHALMIC at 20:00

## 2022-12-11 RX ADMIN — CLONIDINE HYDROCHLORIDE 0.05 MG: 0.1 TABLET ORAL at 09:11

## 2022-12-11 RX ADMIN — CLONIDINE HYDROCHLORIDE 0.05 MG: 0.1 TABLET ORAL at 20:06

## 2022-12-11 RX ADMIN — ATENOLOL 25 MG: 50 TABLET ORAL at 09:11

## 2022-12-11 RX ADMIN — OXYCODONE HYDROCHLORIDE AND ACETAMINOPHEN 1000 MG: 500 TABLET ORAL at 09:11

## 2022-12-11 RX ADMIN — BRIMONIDINE TARTRATE 1 DROP: 2 SOLUTION OPHTHALMIC at 09:12

## 2022-12-11 RX ADMIN — DEXAMETHASONE SODIUM PHOSPHATE 6 MG: 10 INJECTION INTRAMUSCULAR; INTRAVENOUS at 09:11

## 2022-12-11 RX ADMIN — ATORVASTATIN CALCIUM 10 MG: 10 TABLET, FILM COATED ORAL at 09:11

## 2022-12-11 RX ADMIN — APIXABAN 5 MG: 5 TABLET, FILM COATED ORAL at 09:11

## 2022-12-11 RX ADMIN — ZINC SULFATE 220 MG (50 MG) CAPSULE 50 MG: CAPSULE at 09:11

## 2022-12-11 RX ADMIN — LATANOPROST 1 DROP: 50 SOLUTION OPHTHALMIC at 20:05

## 2022-12-11 RX ADMIN — IPRATROPIUM BROMIDE AND ALBUTEROL 1 PUFF: 20; 100 SPRAY, METERED RESPIRATORY (INHALATION) at 06:25

## 2022-12-11 RX ADMIN — IPRATROPIUM BROMIDE AND ALBUTEROL 1 PUFF: 20; 100 SPRAY, METERED RESPIRATORY (INHALATION) at 12:32

## 2022-12-11 RX ADMIN — GUAIFENESIN SYRUP AND DEXTROMETHORPHAN 10 ML: 100; 10 SYRUP ORAL at 16:44

## 2022-12-11 RX ADMIN — BRIMONIDINE TARTRATE 1 DROP: 2 SOLUTION OPHTHALMIC at 20:08

## 2022-12-11 RX ADMIN — BARICITINIB 2 MG: 2 TABLET, FILM COATED ORAL at 09:11

## 2022-12-11 RX ADMIN — ISOSORBIDE MONONITRATE 30 MG: 30 TABLET, EXTENDED RELEASE ORAL at 09:11

## 2022-12-11 RX ADMIN — LEVOTHYROXINE SODIUM 88 MCG: 0.09 TABLET ORAL at 05:31

## 2022-12-11 RX ADMIN — ASPIRIN 81 MG: 81 TABLET, COATED ORAL at 09:11

## 2022-12-11 RX ADMIN — BENZONATATE 100 MG: 100 CAPSULE ORAL at 20:06

## 2022-12-11 RX ADMIN — DULOXETINE HYDROCHLORIDE 30 MG: 30 CAPSULE, DELAYED RELEASE ORAL at 09:10

## 2022-12-11 RX ADMIN — ANTI-FUNGAL POWDER MICONAZOLE NITRATE TALC FREE: 1.42 POWDER TOPICAL at 09:12

## 2022-12-11 RX ADMIN — KETOTIFEN FUMARATE 1 DROP: 0.25 SOLUTION/ DROPS OPHTHALMIC at 09:12

## 2022-12-11 RX ADMIN — ANTI-FUNGAL POWDER MICONAZOLE NITRATE TALC FREE: 1.42 POWDER TOPICAL at 20:08

## 2022-12-11 RX ADMIN — CEFTRIAXONE 2000 MG: 2 INJECTION, POWDER, FOR SOLUTION INTRAMUSCULAR; INTRAVENOUS at 16:44

## 2022-12-11 RX ADMIN — PANTOPRAZOLE SODIUM 40 MG: 40 TABLET, DELAYED RELEASE ORAL at 05:31

## 2022-12-11 RX ADMIN — DORZOLAMIDE HYDROCHLORIDE 1 DROP: 20 SOLUTION/ DROPS OPHTHALMIC at 09:12

## 2022-12-11 ASSESSMENT — PAIN SCALES - GENERAL
PAINLEVEL_OUTOF10: 0

## 2022-12-11 NOTE — ACP (ADVANCE CARE PLANNING)
Advance Care Planning     Advance Care Planning Activator (Inpatient)  Conversation Note      Date of ACP Conversation: 12/11/2022     Conversation Conducted with: Patient with Decision Making Capacity    ACP Activator: Darshan Bautista RN        Health Care Decision Maker:     Current Designated Health Care Decision Maker:     Primary Decision Maker: Eric Leung - [phone]    Secondary Decision Maker: María Elena Cardona - [facility] - [phone]  Click here to complete Healthcare Decision Makers including section of the Healthcare Decision Maker Relationship (ie \"Primary\")      Care Preferences    Ventilation: \"If you were in your present state of health and suddenly became very ill and were unable to breathe on your own, what would your preference be about the use of a ventilator (breathing machine) if it were available to you? \"      Would the patient desire the use of ventilator (breathing machine)?: no    \"If your health worsens and it becomes clear that your chance of recovery is unlikely, what would your preference be about the use of a ventilator (breathing machine) if it were available to you? \"     Would the patient desire the use of ventilator (breathing machine)?: No      Resuscitation  \"CPR works best to restart the heart when there is a sudden event, like a heart attack, in someone who is otherwise healthy. Unfortunately, CPR does not typically restart the heart for people who have serious health conditions or who are very sick. \"    \"In the event your heart stopped as a result of an underlying serious health condition, would you want attempts to be made to restart your heart (answer \"yes\" for attempt to resuscitate) or would you prefer a natural death (answer \"no\" for do not attempt to resuscitate)? \" yes       [x] Yes   [] No   Educated Patient / Mammie Louder regarding differences between Advance Directives and portable DNR orders.     Length of ACP Conversation in minutes:      Bowen Flores Outcomes:  [x] ACP discussion completed  [] Existing advance directive reviewed with patient; no changes to patient's previously recorded wishes  [] New Advance Directive completed  [] Portable Do Not Rescitate prepared for Provider review and signature  [] POLST/POST/MOLST/MOST prepared for Provider review and signature      Follow-up plan:    [] Schedule follow-up conversation to continue planning  [] Referred individual to Provider for additional questions/concerns   [] Advised patient/agent/surrogate to review completed ACP document and update if needed with changes in condition, patient preferences or care setting    [x] This note routed to one or more involved healthcare providers

## 2022-12-11 NOTE — CARE COORDINATION
12-11-Cm note: (covid positive 12-9) spoke to pt for transition of care, pt lives with her , she has a wheelchair, wheeled walker, and a walk in shower, she is requesting a BSC, states she was supposed to get one when she was here last visit but she never got it, pt will need an order for [facility], pt is active with [facility] care , pt will a resumption of care order at [place]. Pt on 2l nc at this time no home 0xygen,pt will need testing  , she has no preference for DME company. Pt's  is at the bedside, he will provide transport at [place]. Cm following.  Electronically signed by Colleen Falcon RN on 12/11/2022 at 11:57 AM

## 2022-12-11 NOTE — PROGRESS NOTES
Department of Internal Medicine  PN    PCP: Lulú Watson DO  Admitting Physician: Dr. Steven Morales  Consultants:   Date of Service: 12/9/2022    CHIEF COMPLAINT:  weakness    HISTORY OF PRESENT ILLNESS:    Patient is an 80-year-old female who presented to the ED with generalized weakness and fatigue for the last few days. Patient denies any shortness of breath or cough. She denies any subjective fever or chills. She denies any dysuria. patient did obtain the COVID-19 vaccination    12/10/2022  Patient seen examined on monitored floor. Patient's  is at the bedside and case discussed. Patient thinks she feels a little bit better but still very very weak. Creatinine improving with currently BUN/creatinine 55/1.0 with a creatinine 1.3 on admission. Transaminases normal with WBC still elevated 22.5 with hemoglobin 15.7. CRP is improved to 7.2 with D-dimer 319. Temperature 97.7 with a heart rate 92 with blood pressure 140/113. O2 sat 96% on 6 L nasal cannula. Patient still tachycardic. We will put her back on her clonidine but switch to oral form. 12/11/2022  Patient seen examined on [address]. Case discussed with patient's nurse today. Patient has no peripheral IV sites for lab work. Patient is also complaining of increasing cough nonproductive cough. Patient's  is at the bedside and case discussed. Temperature 98.2 with heart rate 84 and blood pressure 120/60. O2 sat 95% on 2 L nasal cannula.       PAST MEDICAL Hx:  Past Medical History:   Diagnosis Date    Cataract     right eye    Cholecystitis with cholelithiasis     Diverticulitis     Diverticulosis     DJD (degenerative joint disease)     DVT (deep venous thrombosis) (HCC)     RLE    Fibroids     Glaucoma     HTN (hypertension)     Hyperlipidemia     states on med for precaution    Morbid obesity ([facility] [address]     GLENNA on CPAP     Osteoarthritis     PE (pulmonary embolism)     Renal cyst     right       PAST SURGICAL Hx:   Past Surgical History: Procedure Laterality Date    CATARACT REMOVAL WITH IMPLANT  2011    left eye    CHOLECYSTECTOMY, [address]  2012    cystoscopy, right ureteroscopy with retrograde pyelograms, stone manipulation, stent insertion, laser lithotripsy with holmium laser    HYSTERECTOMY (CERVIX STATUS UNKNOWN)      MI GLAUCOMA SURG,TRABECU AB EXTERNO Right 11/15/2018    RIGHT EYE REVISION OF XEN EYE STENT, TRABECULECTOMY performed by Anuradha Pelletier MD at [facility] OR    [facility] AND [facility] (CERVIX REMOVED)  [facility]    for RLE DVT       FAMILY Hx:  Family History   Problem Relation Age of Onset    Cancer Brother          - lung cancer    COPD Father     Coronary Art Dis Father     Heart Attack Mother 62        sudden death       HOME MEDICATIONS:  Prior to Admission medications    Medication Sig Start Date End Date Taking?  Authorizing Provider   atenolol (TENORMIN) 25 MG tablet Take 1 tablet by mouth daily 22   Marion Risk, DO   ascorbic acid (VITAMIN C) 1000 MG tablet Take 1 tablet by mouth daily 22   Marion Risk, DO   apixaban (ELIQUIS) 5 MG TABS tablet Take 1 tablet by mouth 2 times daily 22   Marion Risk, DO   potassium citrate (UROCIT-K) 10 MEQ (1080 MG) extended release tablet Take by mouth daily    Historical Provider, MD   DULoxetine (CYMBALTA) 30 MG extended release capsule Take 30 mg by mouth daily    Historical Provider, MD   isosorbide mononitrate (IMDUR) 30 MG extended release tablet Take 30 mg by mouth daily    Historical Provider, MD   zinc gluconate 50 MG tablet Take 50 mg by mouth daily    Historical Provider, MD   Boswellia-Glucosamine-Vit D (OSTEO BI-FLEX ONE PER DAY PO) Take 1 tablet by mouth daily    Historical Provider, MD   sodium chloride (CANDY 128) 5 % ophthalmic solution Place 1 drop into both eyes 4 times daily    Historical Provider, MD   latanoprost (XALATAN) 0.005 % ophthalmic solution Place 1 drop into the left eye nightly    Historical Provider, MD   dorzolamide (TRUSOPT) 2 % ophthalmic solution Place 1 drop into the left eye in the morning and at bedtime    Historical Provider, MD   brimonidine (ALPHAGAN) 0.2 % ophthalmic solution Place 1 drop into both eyes in the morning and at bedtime    Historical Provider, MD   olopatadine (PATADAY) 0.2 % SOLN ophthalmic solution Place 1 drop into both eyes daily    Historical Provider, MD   levothyroxine (SYNTHROID) 88 MCG tablet Take 1 tablet by mouth Daily 4/24/19   Griffin Hernandez, DO   pantoprazole (PROTONIX) 40 MG tablet Take 1 tablet by mouth every morning (before breakfast) 4/24/19   Griffin Hernandez, DO   oxybutynin (DITROPAN) 5 MG tablet Take 1 tablet by mouth 2 times daily 4/23/19   Griffin Hernandez, DO   hydrocortisone 2.5 % cream Apply topically 2 times daily. Patient taking differently: Apply 1 application topically 2 times daily Apply topically 2 times daily to where skin rash noted 4/23/19   Griffin Hernandez,    Psyllium (METAMUCIL FIBER PO) Take by mouth 2 times daily    Historical Provider, MD   aspirin 81 MG EC tablet Take 81 mg by mouth daily Pt instructed to check hold with dr. Jenn Guzman Provider, MD   [facility] OIL Take 1,000 mg by mouth daily Ld 11/9/2018  Patient not taking: Reported on 11/28/2022    Historical Provider, MD   Docusate Sodium (DOCULASE PO) Take 100 mg by mouth in the morning and at bedtime    Historical Provider, MD   amlodipine (NORVASC) 2.5 MG tablet Take 1 tablet by mouth daily. 5/9/12   Griffin Hernandez DO   clonidine (CATAPRES) 0.3 MG/24HR Place 1 patch onto the skin once a week Indications: saturday     Historical Provider, MD   atorvastatin (LIPITOR) 10 MG tablet Take 10 mg by mouth daily. Historical Provider, MD   nitroGLYCERIN (NITROSTAT) 0.4 MG SL tablet Place 0.4 mg under the tongue every 5 minutes as needed.       Historical Provider, MD       ALLERGIES:  Pcn [penicillins] and Noroxin [norfloxacin]    SOCIAL Hx:  Social History     Socioeconomic History    Marital status:      Spouse name: Not on file    Number of children: Not on file    Years of education: Not on file    Highest education level: Not on file   Occupational History    Occupation: parminder     Comment: retired 1985   Tobacco Use    Smoking status: Former     Packs/day: 1.00     Years: 30.00     Pack years: 30.00     Types: Cigarettes     Quit date: 1991     Years since quittin.6    Smokeless tobacco: Never   Vaping Use    Vaping Use: Never used   Substance and Sexual Activity    Alcohol use: No    Drug use: No    Sexual activity: Not on file   Other Topics Concern    Not on file   Social History Narrative    Not on file     Social Determinants of Health     Financial Resource Strain: Not on file   Food Insecurity: Not on file   Transportation Needs: Not on file   Physical Activity: Not on file   Stress: Not on file   Social Connections: Not on file   Intimate Partner Violence: Not on file   Housing Stability: Not on file       ROS: Positive in bold  General:   Denies chills, fatigue, fever, malaise, night sweats or weight loss    Psychological:   Denies anxiety, disorientation or hallucinations    ENT:    Denies epistaxis, headaches, vertigo or visual changes    Cardiovascular:   Denies any chest pain, irregular heartbeats, or palpitations. No paroxysmal nocturnal dyspnea. Respiratory:   Denies shortness of breath, coughing, sputum production, hemoptysis, or wheezing. No orthopnea. Gastrointestinal:   Denies nausea, vomiting, diarrhea, or constipation. Denies any abdominal pain. Denies change in bowel habits or stools. Genito-Urinary:    Denies any urgency, frequency, hematuria. Voiding without difficulty. Musculoskeletal:   Denies joint pain, joint stiffness, joint swelling or muscle pain    Neurology:    Denies any headache or focal neurological deficits. No weakness or paresthesia.     Derm: Denies any rashes, ulcers, or excoriations. Denies bruising. Extremities:   Denies any lower extremity swelling or edema. PHYSICAL EXAM: Abnormal findings noted  VITALS:  Vitals:    12/11/22 1230   BP: 120/60   Pulse: 84   Resp: 20   Temp: 98.2 °F (36.8 °C)   SpO2: 95%         CONSTITUTIONAL:    Awake, alert, cooperative, no apparent distress, and appears stated age    EYES:    EOMI, sclera clear, conjunctiva normal    ENT:    Normocephalic, atraumatic, External ears without lesions. NECK:    Supple, symmetrical, trachea midline, no JVD    HEMATOLOGIC/LYMPHATICS:    No cervical lymphadenopathy and no supraclavicular lymphadenopathy    LUNGS:    Symmetric. No increased work of breathing, good air exchange, clear to auscultation bilaterally, no wheezes, rhonchi, or rales,   Patient has coarse breath sounds and rhonchi bilaterally    CARDIOVASCULAR:    Normal apical impulse, regular rate and rhythm, normal S1 and S2, no S3 or S4, and no murmur noted    ABDOMEN:    soft, non-distended, non-tender    MUSCULOSKELETAL:    There is no redness, warmth, or swelling of the joints. NEUROLOGIC:    Awake, alert, oriented to name, place and time. SKIN:    No bruising or bleeding. No redness, warmth, or swelling    EXTREMITIES:    Peripheral pulses present. No edema, cyanosis, or swelling.     LINES/CATHETERS     LABORATORY DATA:  CBC with Differential:    Lab Results   Component Value Date/Time    WBC 22.5 12/10/2022 07:17 AM    RBC 5.21 12/10/2022 07:17 AM    HGB 15.7 12/10/2022 07:17 AM    HCT 46.2 12/10/2022 07:17 AM     12/10/2022 07:17 AM    MCV 88.7 12/10/2022 07:17 AM    MCH 30.1 12/10/2022 07:17 AM    MCHC 34.0 12/10/2022 07:17 AM    RDW 14.1 12/10/2022 07:17 AM    SEGSPCT 55 05/24/2012 02:10 PM    LYMPHOPCT 9.0 12/10/2022 07:17 AM    MONOPCT 3.0 12/10/2022 07:17 AM    MYELOPCT 1 03/27/2012 12:50 PM    BASOPCT 1.0 12/10/2022 07:17 AM    MONOSABS 0.68 12/10/2022 07:17 AM    LYMPHSABS 2.03 12/10/2022 07:17 AM    EOSABS 0.00 12/10/2022 07:17 AM    BASOSABS 0.23 12/10/2022 07:17 AM     CMP:    Lab Results   Component Value Date/Time     12/10/2022 07:17 AM    K 4.1 12/10/2022 07:17 AM    K 4.0 12/09/2022 04:29 PM     12/10/2022 07:17 AM    CO2 20 12/10/2022 07:17 AM    BUN 55 12/10/2022 07:17 AM    CREATININE 1.0 12/10/2022 07:17 AM    GFRAA >60 04/23/2019 06:15 AM    LABGLOM 56 12/10/2022 07:17 AM    GLUCOSE 123 12/10/2022 07:17 AM    GLUCOSE 105 05/24/2012 02:10 PM    PROT 6.6 12/10/2022 07:17 AM    LABALBU 2.5 12/10/2022 07:17 AM    LABALBU 3.9 05/24/2012 02:10 PM    CALCIUM 9.5 12/10/2022 07:17 AM    BILITOT 1.0 12/10/2022 07:17 AM    ALKPHOS 109 12/10/2022 07:17 AM    AST 21 12/10/2022 07:17 AM    ALT 24 12/10/2022 07:17 AM       ASSESSMENT/PLAN:  Acute hypoxic respiratory failure  COVID-19  Pneumonia  Mild aortic stenosis  Pulmonary hypertension  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 Patient found to to be acutely hypoxic. Patient x-ray revealed pneumonia. COVID-19 came back positive. Patient states that she has been vaccinated. Patient placed on IV steroids as well as Combivent. Patient started on IV antibiotics as well. Patient counseled on taking deep breaths and incentive spirometry use. Continue to monitor pulse ox and wean off supplemental oxygen as tolerated. Home medication reviewed  Consult pharmacy for COVID protocol  Combivent Respimat 1 puff 4 times daily  Decadron 6 mg IV push daily  IV Rocephin 1 g daily  Doxycycline 100 mg IV piggyback twice daily    Consult IR for midline-no peripheral IV access  Tessalon Perles 100 mg 3 times daily  Robitussin-DM 10 cc every 4 hours as needed for cough    BMP, CBC in a.mDereje Toney DO  12:46 PM  12/11/2022

## 2022-12-12 LAB
ACINETOBACTER CALCOAC BAUMANNII COMPLEX BY PCR: NOT DETECTED
ALBUMIN SERPL-MCNC: 2.6 G/DL (ref 3.5–5.2)
ALP BLD-CCNC: 85 U/L (ref 35–104)
ALT SERPL-CCNC: 31 U/L (ref 0–32)
ANION GAP SERPL CALCULATED.3IONS-SCNC: 11 MMOL/L (ref 7–16)
AST SERPL-CCNC: 28 U/L (ref 0–31)
BACTEROIDES FRAGILIS BY PCR: NOT DETECTED
BASOPHILS ABSOLUTE: 0 E9/L (ref 0–0.2)
BASOPHILS RELATIVE PERCENT: 0.4 % (ref 0–2)
BILIRUB SERPL-MCNC: 0.6 MG/DL (ref 0–1.2)
BOTTLE TYPE: ABNORMAL
BUN BLDV-MCNC: 39 MG/DL (ref 6–23)
BURR CELLS: ABNORMAL
C-REACTIVE PROTEIN: 1.1 MG/DL (ref 0–0.4)
CALCIUM SERPL-MCNC: 9.2 MG/DL (ref 8.6–10.2)
CANDIDA ALBICANS BY PCR: NOT DETECTED
CANDIDA AURIS BY PCR: NOT DETECTED
CANDIDA GLABRATA BY PCR: NOT DETECTED
CANDIDA KRUSEI BY PCR: NOT DETECTED
CANDIDA PARAPSILOSIS BY PCR: NOT DETECTED
CANDIDA TROPICALIS BY PCR: NOT DETECTED
CHLORIDE BLD-SCNC: 103 MMOL/L (ref 98–107)
CO2: 24 MMOL/L (ref 22–29)
CREAT SERPL-MCNC: 0.9 MG/DL (ref 0.5–1)
CRYPTOCOCCUS NEOFORMANS/GATTII BY PCR: NOT DETECTED
D DIMER: 201 NG/ML DDU
ENTEROBACTER CLOACAE COMPLEX BY PCR: NOT DETECTED
ENTEROBACTERALES BY PCR: NOT DETECTED
ENTEROCOCCUS FAECALIS BY PCR: NOT DETECTED
ENTEROCOCCUS FAECIUM BY PCR: NOT DETECTED
EOSINOPHILS ABSOLUTE: 0 E9/L (ref 0.05–0.5)
EOSINOPHILS RELATIVE PERCENT: 0 % (ref 0–6)
ESCHERICHIA COLI BY PCR: NOT DETECTED
GFR SERPL CREATININE-BSD FRML MDRD: >60 ML/MIN/1.73
GLUCOSE BLD-MCNC: 108 MG/DL (ref 74–99)
HAEMOPHILUS INFLUENZAE BY PCR: NOT DETECTED
HCT VFR BLD CALC: 40.4 % (ref 34–48)
HEMOGLOBIN: 13.4 G/DL (ref 11.5–15.5)
KLEBSIELLA AEROGENES BY PCR: NOT DETECTED
KLEBSIELLA OXYTOCA BY PCR: NOT DETECTED
KLEBSIELLA PNEUMONIAE GROUP BY PCR: NOT DETECTED
LISTERIA MONOCYTOGENES BY PCR: NOT DETECTED
LYMPHOCYTES ABSOLUTE: 1.59 E9/L (ref 1.5–4)
LYMPHOCYTES RELATIVE PERCENT: 7.8 % (ref 20–42)
MCH RBC QN AUTO: 29.3 PG (ref 26–35)
MCHC RBC AUTO-ENTMCNC: 33.2 % (ref 32–34.5)
MCV RBC AUTO: 88.4 FL (ref 80–99.9)
METHICILLIN RESISTANCE MECA/C  BY PCR: DETECTED
MONOCYTES ABSOLUTE: 0.8 E9/L (ref 0.1–0.95)
MONOCYTES RELATIVE PERCENT: 3.5 % (ref 2–12)
NEISSERIA MENINGITIDIS BY PCR: NOT DETECTED
NEUTROPHILS ABSOLUTE: 17.71 E9/L (ref 1.8–7.3)
NEUTROPHILS RELATIVE PERCENT: 88.7 % (ref 43–80)
ORDER NUMBER: ABNORMAL
PDW BLD-RTO: 13.6 FL (ref 11.5–15)
PLATELET # BLD: 373 E9/L (ref 130–450)
PMV BLD AUTO: 9.9 FL (ref 7–12)
POIKILOCYTES: ABNORMAL
POLYCHROMASIA: ABNORMAL
POTASSIUM SERPL-SCNC: 4 MMOL/L (ref 3.5–5)
PROCALCITONIN: 0.04 NG/ML (ref 0–0.08)
PROTEUS SPECIES BY PCR: NOT DETECTED
PSEUDOMONAS AERUGINOSA BY PCR: NOT DETECTED
RBC # BLD: 4.57 E12/L (ref 3.5–5.5)
SALMONELLA SPECIES BY PCR: NOT DETECTED
SERRATIA MARCESCENS BY PCR: NOT DETECTED
SODIUM BLD-SCNC: 138 MMOL/L (ref 132–146)
SOURCE OF BLOOD CULTURE: ABNORMAL
STAPHYLOCOCCUS AUREUS BY PCR: NOT DETECTED
STAPHYLOCOCCUS EPIDERMIDIS BY PCR: DETECTED
STAPHYLOCOCCUS LUGDUNENSIS BY PCR: NOT DETECTED
STAPHYLOCOCCUS SPECIES BY PCR: DETECTED
STENOTROPHOMONAS MALTOPHILIA BY PCR: NOT DETECTED
STREPTOCOCCUS AGALACTIAE BY PCR: NOT DETECTED
STREPTOCOCCUS PNEUMONIAE BY PCR: NOT DETECTED
STREPTOCOCCUS PYOGENES  BY PCR: NOT DETECTED
STREPTOCOCCUS SPECIES BY PCR: NOT DETECTED
TOTAL PROTEIN: 5.6 G/DL (ref 6.4–8.3)
WBC # BLD: 19.9 E9/L (ref 4.5–11.5)

## 2022-12-12 PROCEDURE — 94640 AIRWAY INHALATION TREATMENT: CPT

## 2022-12-12 PROCEDURE — 2700000000 HC OXYGEN THERAPY PER DAY

## 2022-12-12 PROCEDURE — 6370000000 HC RX 637 (ALT 250 FOR IP): Performed by: INTERNAL MEDICINE

## 2022-12-12 PROCEDURE — 2580000003 HC RX 258: Performed by: INTERNAL MEDICINE

## 2022-12-12 PROCEDURE — 6360000002 HC RX W HCPCS: Performed by: INTERNAL MEDICINE

## 2022-12-12 PROCEDURE — 80053 COMPREHEN METABOLIC PANEL: CPT

## 2022-12-12 PROCEDURE — 86140 C-REACTIVE PROTEIN: CPT

## 2022-12-12 PROCEDURE — 51702 INSERT TEMP BLADDER CATH: CPT

## 2022-12-12 PROCEDURE — 97110 THERAPEUTIC EXERCISES: CPT | Performed by: PHYSICAL THERAPIST

## 2022-12-12 PROCEDURE — 2060000000 HC ICU INTERMEDIATE R&B

## 2022-12-12 PROCEDURE — 97530 THERAPEUTIC ACTIVITIES: CPT | Performed by: PHYSICAL THERAPIST

## 2022-12-12 PROCEDURE — 36415 COLL VENOUS BLD VENIPUNCTURE: CPT

## 2022-12-12 PROCEDURE — 97161 PT EVAL LOW COMPLEX 20 MIN: CPT | Performed by: PHYSICAL THERAPIST

## 2022-12-12 PROCEDURE — 85378 FIBRIN DEGRADE SEMIQUANT: CPT

## 2022-12-12 PROCEDURE — 84145 PROCALCITONIN (PCT): CPT

## 2022-12-12 PROCEDURE — 85025 COMPLETE CBC W/AUTO DIFF WBC: CPT

## 2022-12-12 PROCEDURE — 2500000003 HC RX 250 WO HCPCS: Performed by: INTERNAL MEDICINE

## 2022-12-12 RX ADMIN — ATENOLOL 25 MG: 50 TABLET ORAL at 09:06

## 2022-12-12 RX ADMIN — BENZONATATE 100 MG: 100 CAPSULE ORAL at 09:06

## 2022-12-12 RX ADMIN — IPRATROPIUM BROMIDE AND ALBUTEROL 1 PUFF: 20; 100 SPRAY, METERED RESPIRATORY (INHALATION) at 22:00

## 2022-12-12 RX ADMIN — ASPIRIN 81 MG: 81 TABLET, COATED ORAL at 09:06

## 2022-12-12 RX ADMIN — APIXABAN 5 MG: 5 TABLET, FILM COATED ORAL at 09:06

## 2022-12-12 RX ADMIN — KETOTIFEN FUMARATE 1 DROP: 0.25 SOLUTION/ DROPS OPHTHALMIC at 09:09

## 2022-12-12 RX ADMIN — ANTI-FUNGAL POWDER MICONAZOLE NITRATE TALC FREE: 1.42 POWDER TOPICAL at 21:18

## 2022-12-12 RX ADMIN — LEVOTHYROXINE SODIUM 88 MCG: 0.09 TABLET ORAL at 06:05

## 2022-12-12 RX ADMIN — GUAIFENESIN SYRUP AND DEXTROMETHORPHAN 10 ML: 100; 10 SYRUP ORAL at 16:43

## 2022-12-12 RX ADMIN — BRIMONIDINE TARTRATE 1 DROP: 2 SOLUTION OPHTHALMIC at 09:09

## 2022-12-12 RX ADMIN — ZINC SULFATE 220 MG (50 MG) CAPSULE 50 MG: CAPSULE at 09:07

## 2022-12-12 RX ADMIN — IPRATROPIUM BROMIDE AND ALBUTEROL 1 PUFF: 20; 100 SPRAY, METERED RESPIRATORY (INHALATION) at 07:11

## 2022-12-12 RX ADMIN — IPRATROPIUM BROMIDE AND ALBUTEROL 1 PUFF: 20; 100 SPRAY, METERED RESPIRATORY (INHALATION) at 19:05

## 2022-12-12 RX ADMIN — BENZONATATE 100 MG: 100 CAPSULE ORAL at 16:44

## 2022-12-12 RX ADMIN — BENZONATATE 100 MG: 100 CAPSULE ORAL at 21:18

## 2022-12-12 RX ADMIN — APIXABAN 5 MG: 5 TABLET, FILM COATED ORAL at 21:18

## 2022-12-12 RX ADMIN — CEFTRIAXONE 2000 MG: 2 INJECTION, POWDER, FOR SOLUTION INTRAMUSCULAR; INTRAVENOUS at 16:44

## 2022-12-12 RX ADMIN — DEXAMETHASONE SODIUM PHOSPHATE 6 MG: 10 INJECTION INTRAMUSCULAR; INTRAVENOUS at 09:06

## 2022-12-12 RX ADMIN — DULOXETINE HYDROCHLORIDE 30 MG: 30 CAPSULE, DELAYED RELEASE ORAL at 09:07

## 2022-12-12 RX ADMIN — PYRIDOXINE HCL TAB 50 MG 50 MG: 50 TAB at 09:06

## 2022-12-12 RX ADMIN — CLONIDINE HYDROCHLORIDE 0.05 MG: 0.1 TABLET ORAL at 21:18

## 2022-12-12 RX ADMIN — ANTI-FUNGAL POWDER MICONAZOLE NITRATE TALC FREE: 1.42 POWDER TOPICAL at 09:07

## 2022-12-12 RX ADMIN — IPRATROPIUM BROMIDE AND ALBUTEROL 1 PUFF: 20; 100 SPRAY, METERED RESPIRATORY (INHALATION) at 11:40

## 2022-12-12 RX ADMIN — Medication 10 ML: at 09:08

## 2022-12-12 RX ADMIN — PANTOPRAZOLE SODIUM 40 MG: 40 TABLET, DELAYED RELEASE ORAL at 06:05

## 2022-12-12 RX ADMIN — DOXYCYCLINE 100 MG: 100 INJECTION, POWDER, LYOPHILIZED, FOR SOLUTION INTRAVENOUS at 16:47

## 2022-12-12 RX ADMIN — BARICITINIB 4 MG: 2 TABLET, FILM COATED ORAL at 09:07

## 2022-12-12 RX ADMIN — ATORVASTATIN CALCIUM 10 MG: 10 TABLET, FILM COATED ORAL at 09:06

## 2022-12-12 RX ADMIN — OXYCODONE HYDROCHLORIDE AND ACETAMINOPHEN 1000 MG: 500 TABLET ORAL at 09:06

## 2022-12-12 RX ADMIN — CLONIDINE HYDROCHLORIDE 0.05 MG: 0.1 TABLET ORAL at 09:06

## 2022-12-12 RX ADMIN — ISOSORBIDE MONONITRATE 30 MG: 30 TABLET, EXTENDED RELEASE ORAL at 09:06

## 2022-12-12 RX ADMIN — DORZOLAMIDE HYDROCHLORIDE 1 DROP: 20 SOLUTION/ DROPS OPHTHALMIC at 21:19

## 2022-12-12 RX ADMIN — LATANOPROST 1 DROP: 50 SOLUTION OPHTHALMIC at 21:19

## 2022-12-12 RX ADMIN — DORZOLAMIDE HYDROCHLORIDE 1 DROP: 20 SOLUTION/ DROPS OPHTHALMIC at 09:08

## 2022-12-12 RX ADMIN — SODIUM CHLORIDE, PRESERVATIVE FREE 10 ML: 5 INJECTION INTRAVENOUS at 21:21

## 2022-12-12 ASSESSMENT — PAIN SCALES - GENERAL
PAINLEVEL_OUTOF10: 0

## 2022-12-12 NOTE — PROGRESS NOTES
Department of Internal Medicine  PN    PCP: Mirella Smith DO  Admitting Physician: Dr. Tomasa Crawley  Consultants:   Date of Service: 12/9/2022    CHIEF COMPLAINT:  weakness    HISTORY OF PRESENT ILLNESS:    Patient is an 66-year-old female who presented to the ED with generalized weakness and fatigue for the last few days. Patient denies any shortness of breath or cough. She denies any subjective fever or chills. She denies any dysuria. patient did obtain the COVID-19 vaccination    12/10/2022  Patient seen examined on monitored floor. Patient's  is at the bedside and case discussed. Patient thinks she feels a little bit better but still very very weak. Creatinine improving with currently BUN/creatinine 55/1.0 with a creatinine 1.3 on admission. Transaminases normal with WBC still elevated 22.5 with hemoglobin 15.7. CRP is improved to 7.2 with D-dimer 319. Temperature 97.7 with a heart rate 92 with blood pressure 140/113. O2 sat 96% on 6 L nasal cannula. Patient still tachycardic. We will put her back on her clonidine but switch to oral form. 12/11/2022  Patient seen examined on [facility]. Case discussed with patient's nurse today. Patient has no peripheral IV sites for lab work. Patient is also complaining of increasing cough nonproductive cough. Patient's  is at the bedside and case discussed. Temperature 98.2 with heart rate 84 and blood pressure 120/60. O2 sat 95% on 2 L nasal cannula. 12/12/2022  Patient seen examined on [facility]. Patient's  is at the bedside and case discussed. The patient denies any problem with any chest or abdominal pain this time. The patient does feel short of breath with activity. Patient denies any fever/chills or productive cough. BUN/creatinine was 39/0.9 normal electrolytes. Transaminases normal with WBC is 19.9 hemoglobin 13.4. Temperature is 97.9 with a heart rate of 94 blood pressure 121/70. O2 sat 94% on 4 L nasal cannula.   Urine output is fairly good.      PAST MEDICAL Hx:  Past Medical History:   Diagnosis Date    Cataract     right eye    Cholecystitis with cholelithiasis     Diverticulitis     Diverticulosis     DJD (degenerative joint disease)     DVT (deep venous thrombosis) (HCC)     RLE    Fibroids     Glaucoma     HTN (hypertension)     Hyperlipidemia     states on med for precaution    Morbid obesity ([address]     GLENNA on CPAP     Osteoarthritis     PE (pulmonary embolism)     Renal cyst     right       PAST SURGICAL Hx:   Past Surgical History:   Procedure Laterality Date    CATARACT REMOVAL WITH IMPLANT  2011    left eye    CHOLECYSTECTOMY, LAPAROSCOPIC      COLONOSCOPY      CYSTOSCOPY  2012    cystoscopy, right ureteroscopy with retrograde pyelograms, stone manipulation, stent insertion, laser lithotripsy with holmium laser    HYSTERECTOMY (CERVIX STATUS UNKNOWN)      AK GLAUCOMA SURG,TRABECU AB EXTERNO Right 11/15/2018    RIGHT EYE REVISION OF XEN EYE STENT, TRABECULECTOMY performed by Jeet Panchal MD at [facility] (CERVIX REMOVED)  [facility]    for RLE DVT       FAMILY Hx:  Family History   Problem Relation Age of Onset    Cancer Brother          - lung cancer    COPD Father     Coronary Art Dis Father     Heart Attack Mother 62        sudden death       HOME MEDICATIONS:  Prior to Admission medications    Medication Sig Start Date End Date Taking?  Authorizing Provider   atenolol (TENORMIN) 25 MG tablet Take 1 tablet by mouth daily 22   Kaykay Carl, DO   ascorbic acid (VITAMIN C) 1000 MG tablet Take 1 tablet by mouth daily 22   Kaykay Carl, DO   apixaban (ELIQUIS) 5 MG TABS tablet Take 1 tablet by mouth 2 times daily 22   Kaykaytaj Carl, DO   potassium citrate (UROCIT-K) 10 MEQ (1080 MG) extended release tablet Take by mouth daily    Historical Provider, MD   DULoxetine (CYMBALTA) 30 MG extended release capsule Take 30 mg by mouth daily    Historical Provider, MD   isosorbide mononitrate (IMDUR) 30 MG extended release tablet Take 30 mg by mouth daily    Historical Provider, MD   zinc gluconate 50 MG tablet Take 50 mg by mouth daily    Historical Provider, MD   Boswellia-Glucosamine-Vit D (OSTEO BI-FLEX ONE PER DAY PO) Take 1 tablet by mouth daily    Historical Provider, MD   sodium chloride (CANDY 128) 5 % ophthalmic solution Place 1 drop into both eyes 4 times daily    Historical Provider, MD   latanoprost (XALATAN) 0.005 % ophthalmic solution Place 1 drop into the left eye nightly    Historical Provider, MD   dorzolamide (TRUSOPT) 2 % ophthalmic solution Place 1 drop into the left eye in the morning and at bedtime    Historical Provider, MD   brimonidine (ALPHAGAN) 0.2 % ophthalmic solution Place 1 drop into both eyes in the morning and at bedtime    Historical Provider, MD   olopatadine (PATADAY) 0.2 % SOLN ophthalmic solution Place 1 drop into both eyes daily    Historical Provider, MD   levothyroxine (SYNTHROID) 88 MCG tablet Take 1 tablet by mouth Daily 4/24/19   Marylene Jim, DO   pantoprazole (PROTONIX) 40 MG tablet Take 1 tablet by mouth every morning (before breakfast) 4/24/19   Marylene Jim, DO   oxybutynin (DITROPAN) 5 MG tablet Take 1 tablet by mouth 2 times daily 4/23/19   Marylene Jim, DO   hydrocortisone 2.5 % cream Apply topically 2 times daily.   Patient taking differently: Apply 1 application topically 2 times daily Apply topically 2 times daily to where skin rash noted 4/23/19   Marylene Jim, DO   Psyllium (METAMUCIL FIBER PO) Take by mouth 2 times daily    Historical Provider, MD   aspirin 81 MG EC tablet Take 81 mg by mouth daily Pt instructed to check hold with dr. Serra Many Provider, MD   [facility] OIL Take 1,000 mg by mouth daily Ld 11/9/2018  Patient not taking: Reported on 11/28/2022    Historical Provider, MD   Docusate Sodium (DOCULASE PO) Take 100 mg by mouth in the morning and at bedtime    Historical Provider, MD   amlodipine (NORVASC) 2.5 MG tablet Take 1 tablet by mouth daily. 12   Leonard Young DO   clonidine (CATAPRES) 0.3 MG/24HR Place 1 patch onto the skin once a week Indications: saturday     Historical Provider, MD   atorvastatin (LIPITOR) 10 MG tablet Take 10 mg by mouth daily. Historical Provider, MD   nitroGLYCERIN (NITROSTAT) 0.4 MG SL tablet Place 0.4 mg under the tongue every 5 minutes as needed. Historical Provider, MD       ALLERGIES:  Pcn [penicillins] and Noroxin [norfloxacin]    SOCIAL Hx:  Social History     Socioeconomic History    Marital status:      Spouse name: Not on file    Number of children: Not on file    Years of education: Not on file    Highest education level: Not on file   Occupational History    Occupation: parminder     Comment: retired    Tobacco Use    Smoking status: Former     Packs/day: 1.00     Years: 30.00     Pack years: 30.00     Types: Cigarettes     Quit date: 1991     Years since quittin.6    Smokeless tobacco: Never   Vaping Use    Vaping Use: Never used   Substance and Sexual Activity    Alcohol use: No    Drug use: No    Sexual activity: Not on file   Other Topics Concern    Not on file   Social History Narrative    Not on file     Social Determinants of Health     Financial Resource Strain: Not on file   Food Insecurity: Not on file   Transportation Needs: Not on file   Physical Activity: Not on file   Stress: Not on file   Social Connections: Not on file   Intimate Partner Violence: Not on file   Housing Stability: Not on file       ROS: Positive in bold  General:   Denies chills, fatigue, fever, malaise, night sweats or weight loss    Psychological:   Denies anxiety, disorientation or hallucinations    ENT:    Denies epistaxis, headaches, vertigo or visual changes    Cardiovascular:   Denies any chest pain, irregular heartbeats, or palpitations. No paroxysmal nocturnal dyspnea.     Respiratory:   Denies shortness of breath, coughing, sputum production, hemoptysis, or wheezing. No orthopnea. Gastrointestinal:   Denies nausea, vomiting, diarrhea, or constipation. Denies any abdominal pain. Denies change in bowel habits or stools. Genito-Urinary:    Denies any urgency, frequency, hematuria. Voiding without difficulty. Musculoskeletal:   Denies joint pain, joint stiffness, joint swelling or muscle pain    Neurology:    Denies any headache or focal neurological deficits. No weakness or paresthesia. Derm:    Denies any rashes, ulcers, or excoriations. Denies bruising. Extremities:   Denies any lower extremity swelling or edema. PHYSICAL EXAM: Abnormal findings noted  VITALS:  Vitals:    12/12/22 1140   BP:    Pulse: 94   Resp: 18   Temp:    SpO2: 94%         CONSTITUTIONAL:    Awake, alert, cooperative, no apparent distress, and appears stated age    EYES:    EOMI, sclera clear, conjunctiva normal    ENT:    Normocephalic, atraumatic, External ears without lesions. NECK:    Supple, symmetrical, trachea midline, no JVD    HEMATOLOGIC/LYMPHATICS:    No cervical lymphadenopathy and no supraclavicular lymphadenopathy    LUNGS:    Symmetric. No increased work of breathing, good air exchange, clear to auscultation bilaterally, no wheezes, rhonchi, or rales,   Patient has coarse breath sounds and rhonchi bilaterally    CARDIOVASCULAR:    Normal apical impulse, regular rate and rhythm, normal S1 and S2, no S3 or S4, and no murmur noted    ABDOMEN:    soft, non-distended, non-tender    MUSCULOSKELETAL:    There is no redness, warmth, or swelling of the joints. NEUROLOGIC:    Awake, alert, oriented to name, place and time. SKIN:    No bruising or bleeding. No redness, warmth, or swelling    EXTREMITIES:    Peripheral pulses present. No edema, cyanosis, or swelling.     LINES/CATHETERS     LABORATORY DATA:  CBC with Differential:    Lab Results   Component Value Date/Time    WBC 19.9 12/12/2022 06:02 AM RBC 4.57 12/12/2022 06:02 AM    HGB 13.4 12/12/2022 06:02 AM    HCT 40.4 12/12/2022 06:02 AM     12/12/2022 06:02 AM    MCV 88.4 12/12/2022 06:02 AM    MCH 29.3 12/12/2022 06:02 AM    MCHC 33.2 12/12/2022 06:02 AM    RDW 13.6 12/12/2022 06:02 AM    SEGSPCT 55 05/24/2012 02:10 PM    LYMPHOPCT 7.8 12/12/2022 06:02 AM    MONOPCT 3.5 12/12/2022 06:02 AM    MYELOPCT 1 03/27/2012 12:50 PM    BASOPCT 0.4 12/12/2022 06:02 AM    MONOSABS 0.80 12/12/2022 06:02 AM    LYMPHSABS 1.59 12/12/2022 06:02 AM    EOSABS 0.00 12/12/2022 06:02 AM    BASOSABS 0.00 12/12/2022 06:02 AM     CMP:    Lab Results   Component Value Date/Time     12/12/2022 06:02 AM    K 4.0 12/12/2022 06:02 AM    K 4.0 12/09/2022 04:29 PM     12/12/2022 06:02 AM    CO2 24 12/12/2022 06:02 AM    BUN 39 12/12/2022 06:02 AM    CREATININE 0.9 12/12/2022 06:02 AM    GFRAA >60 04/23/2019 06:15 AM    LABGLOM >60 12/12/2022 06:02 AM    GLUCOSE 108 12/12/2022 06:02 AM    GLUCOSE 105 05/24/2012 02:10 PM    PROT 5.6 12/12/2022 06:02 AM    LABALBU 2.6 12/12/2022 06:02 AM    LABALBU 3.9 05/24/2012 02:10 PM    CALCIUM 9.2 12/12/2022 06:02 AM    BILITOT 0.6 12/12/2022 06:02 AM    ALKPHOS 85 12/12/2022 06:02 AM    AST 28 12/12/2022 06:02 AM    ALT 31 12/12/2022 06:02 AM       ASSESSMENT/PLAN:  Acute hypoxic respiratory failure  COVID-19  Pneumonia  Mild aortic stenosis  Pulmonary hypertension  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 Patient found to to be acutely hypoxic. Patient x-ray revealed pneumonia. COVID-19 came back positive. Patient states that she has been vaccinated. Patient placed on IV steroids as well as Combivent. Patient started on IV antibiotics as well. Patient counseled on taking deep breaths and incentive spirometry use.   Continue to monitor pulse ox and wean off supplemental oxygen as tolerated. Home medication reviewed  Consult pharmacy for COVID protocol  Combivent Respimat 1 puff 4 times daily  Decadron 6 mg IV push daily  IV Rocephin 1 g daily  Doxycycline 100 mg IV piggyback twice daily    Consult IR for midline-no peripheral IV access  Tessalon Perles 100 mg 3 times daily  Robitussin-DM 10 cc every 4 hours as needed for cough    BMP, CBC in a.m.       Niki Toney DO  1:06 PM  12/12/2022

## 2022-12-12 NOTE — PROGRESS NOTES
Physical Therapy Initial Evaluation/Plan of Care    Room #:  8204/8259-93  Patient Name: Elsi Celeste  YOB: 1940  MRN: [id number]    Date of Service: 12/12/2022     Tentative placement recommendation: Subacute rehab  Equipment recommendation: Bedside commode      Evaluating Physical Therapist: Nichole Gongora, [facility] #[id number]      Specific Provider Orders/Date/Referring Provider :  12/12/22 1345    PT eval and treat  Start:  12/12/22 1345,   End:  12/12/22 1345,   ONE TIME,   Standing Count:  1 Occurrences,   R         Marla Yates,      Admitting Diagnosis:   Pneumonia due to organism [J18.9]  Acute UTI [N39.0]  Atrial fibrillation with RVR ([address] [I48.91]  Acute respiratory failure with hypoxia ([address] [J96.01]  Pneumonia due to infectious organism, unspecified laterality, unspecified part of lung [J18.9]     generalized fatigue  Surgery: none  Visit Diagnoses         Codes    Acute respiratory failure with hypoxia ([address]     J96.01    Acute UTI     N39.0    Atrial fibrillation with RVR ([address]     I48.91            Patient Active Problem List   Diagnosis    Acute renal failure (ARF) ([address]    Pyelonephritis    Obstructive uropathy    HTN (hypertension)    Hyperlipidemia    Obstructive sleep apnea    Glaucoma    DJD (degenerative joint disease)    Morbid obesity ([address]    Kidney stone    Pneumonia    Moderate protein-calorie malnutrition (HCC)    Atrial fibrillation with rapid ventricular response ([address]    Pneumonia due to organism        ASSESSMENT of Current Deficits Patient exhibits decreased strength, balance, and endurance impairing functional mobility, transfers, gait , gait distance, and tolerance to activity assist of 2 to safely return to bed. Fatigue/weakness limiting increased functional activity. Patient requires skilled physical therapy to address concerns listed above to increase safety and independence at discharge.          PHYSICAL THERAPY  PLAN OF CARE       Physical therapy plan of care is established based on physician order,  patient diagnosis and clinical assessment    Current Treatment Recommendations:    -Bed Mobility: Lower extremity exercises  and Trunk control activities   -Sitting Balance: Incorporate reaching activities to activate trunk muscles   -Standing Balance: Perform strengthening exercises in standing to promote motor control with or without upper extremity support   -Transfers: Provide instruction on proper hand and foot position for adequate transfer of weight onto lower extremities and use of gait device if needed, Cues for hand placement, technique and safety. Provide stabilization to prevent fall , and Assist with extension of knees trunk and hip to accept weight transfer   -Gait: Gait training, Standing activities to improve: base of support, weight shift, weight bearing , and Pregait training to emphasize: Sequencing , Base of support, Weight shift, Upright, and Safety   -Endurance: Utilize Supervised activities to increase level of endurance to allow for safe functional mobility including transfers and gait     PT long term treatment goals are located in below grid    Patient and or family understand(s) diagnosis, prognosis, and plan of care. Frequency of treatments: Patient will be seen  daily.          Prior Level of Function: Patient ambulated with wheeled walker  reports she has not been able to walk for past 3 days   Rehab Potential: good   for baseline    Past medical history:   Past Medical History:   Diagnosis Date    Cataract     right eye    Cholecystitis with cholelithiasis     Diverticulitis     Diverticulosis     DJD (degenerative joint disease)     DVT (deep venous thrombosis) (HCC)     RLE    Fibroids     Glaucoma     HTN (hypertension)     Hyperlipidemia     states on med for precaution    Morbid obesity ([facility]     GLENNA on CPAP     Osteoarthritis     PE (pulmonary embolism)     Renal cyst     right     Past Surgical History:   Procedure Laterality Date CATARACT REMOVAL WITH IMPLANT  12/2011    left eye    CHOLECYSTECTOMY, [address]  jun 2012    cystoscopy, right ureteroscopy with retrograde pyelograms, stone manipulation, stent insertion, laser lithotripsy with holmium laser    HYSTERECTOMY (CERVIX STATUS UNKNOWN)      IL GLAUCOMA SURG,TRABECU AB EXTERNO Right 11/15/2018    RIGHT EYE REVISION OF XEN EYE STENT, TRABECULECTOMY performed by Estefany Salas MD at [facility] (CERVIX REMOVED)  [facility]    for RLE DVT       SUBJECTIVE:    Precautions: Up with assistance and Check Pulse Oximetry while ambulating , falls, O2, and Droplet plus/COVID-19 ,      Imaging results: XR CHEST (2 VW)    Result Date: 11/28/2022  EXAMINATION: TWO XRAY VIEWS OF THE CHEST 11/28/2022 12:08 pm    Suspect mild pulmonary vascular congestion. XR CHEST PORTABLE    Result Date: 12/9/2022  EXAMINATION: ONE XRAY VIEW OF THE CHEST 12/9/2022 4:10 pm    Right upper lobe opacity worrisome for pneumonia. CT CHEST HIGH RESOLUTION    Result Date: 11/29/2022  EXAMINATION: CT IMAGES OF THE CHEST WITHOUT CONTRAST, HIGH RESOLUTION 11/29/2022      No chronic or acute pulmonary process specifically, no discrete evidence for interstitial lung disease with minimal atelectasis or scarring at the left lung base may be from prior bout of airspace disease. No fibrotic change.        Social history: Patient lives with spouse in a duplex  with Ramp  to enter  Walk in shower       Equipment owned: Wheelchair, [address] [employer], [address] [facility] chair, and Electric wheelchair,       [address] [facility]   How much difficulty turning over in bed?: A Lot  How much difficulty sitting down on / standing up from a chair with arms?: Unable  How much difficulty moving from lying on back to sitting on side of bed?: A Lot  How much help from another person moving to and from a bed to a chair?: Total  How much help from another person needed to walk in hospital room?: Total  How much help from another person for climbing 3-5 steps with a railing?: Total  AM-PAC Inpatient Mobility Raw Score : 8  AM-PAC Inpatient T-Scale Score : 28.52  Mobility Inpatient CMS 0-100% Score: 86.62  Mobility Inpatient CMS G-Code Modifier :     Nursing cleared patient for PT evaluation. The admitting diagnosis and active problem list as listed above have been reviewed prior to the initiation of this evaluation. OBJECTIVE;   Initial Evaluation  Date: 12/12/2022 Treatment Date:     Short Term/ Long Term   Goals   Was pt agreeable to Eval/treatment? Yes  To be met in 5 days   Pain level   0/10        Bed Mobility    Rolling: Maximal assist of 1    Supine to sit: Maximal assist of 1    Sit to supine: Maximal assist of  2    Scooting: Maximal assist of  2    Rolling: Moderate assist of 1    Supine to sit: Moderate assist of 1    Sit to supine: Moderate assist of 1    Scooting: Moderate assist of 1     Transfers Sit to stand: Not assessed  due to pt overall debility, decreased activity tolerance, balance deficits, safety and fall risk. Sit to stand:  Moderate assist of 1     Ambulation    not assessed     20 feet using  wheeled walker with Moderate assist of 1    Stair negotiation: ascended and descended   Not assessed          ROM Within functional limits    Increase range of motion 10% of affected joints    Strength BUE:  refer to OT eval  RLE:  3 3+/5  LLE:  3 3+/5  Increase strength in affected mm groups by 1/3 grade   Balance Sitting EOB:  good -  Dynamic Standing:  not assessed    Sitting EOB:  good    Dynamic Standing: fair +wheeled walker      Patient is Alert & Oriented x person, place, time, and situation and follows directions    Sensation:  Patient  denies numbness/tingling   Edema:  yes bilateral lower extremities   Endurance: fair  +    Vitals:  4 liters high flow nasal cannula   Blood Pressure at rest  Blood Pressure during session    Heart Rate at rest 76 Heart Rate during session 101   SPO2 at rest 94%  SPO2 during session 93%     Patient education  Patient educated on role of Physical Therapy, risks of immobility, safety and plan of care,  importance of mobility while in hospital , ankle pumps, quad set and glut set for edema control, blood clot prevention, positioning for skin integrity and comfort, and proper use/technique of incentive spirometer     Patient response to education:   Pt verbalized understanding Pt demonstrated skill Pt requires further education in this area   Yes Partial Yes      Treatment:  Patient practiced and was instructed/facilitated in the following treatment: Patient performed exercises, assisted to edge of bed,   Sat edge of bed 30 minutes with Supervision  to increase dynamic sitting balance and activity tolerance. Education/performance of incentive inspirometer       Therapeutic Exercises:  ankle pumps, quad sets, glut sets, heel slide, and hip abduction/adduction  x 20 reps. At end of session, patient in bed with     call light and phone within reach,  all lines and tubes intact, nursing notified. Patient would benefit from continued skilled Physical Therapy to improve functional independence and quality of life. Patient's/ family goals   home    Time in  1  Time out  1525    Total Treatment Time  39 minutes    Evaluation time includes thorough review of current medical information, gathering information on past medical history/social history and prior level of function, completion of standardized testing/informal observation of tasks, assessment of data, and development of Plan of care and goals.      CPT codes:  Low Complexity PT evaluation (53612)  Therapeutic activities (97372)   25 minutes  2 unit(s)  Therapeutic exercises (86291)   14 minutes  1 unit(s)    Griffin Jordan PT

## 2022-12-12 NOTE — CARE COORDINATION
SOCIAL WORK / DISCHARGE PLANNING:   COVID positive. Sw tried to contact pt in room but rang busy. Pt has told multiple staff today she is not going to a rehab. Pt resides with spouse, currently active with [address], [facility]. ( Will need RAMON order at dc). Pt currently on 4L O2, does not have home O2, will watch for home O2, will need testing and if qualifies, script for home O2 arrangements. Pt is also requesting a BSC for dc. Script will be needed to order. DME was discussed by previous CM, with the no specific preference. Sw will follow, assist with further dc planning.                Electronically signed by VICKY Long on 12/12/2022 at 3:26 PM

## 2022-12-13 LAB
ALBUMIN SERPL-MCNC: 2.8 G/DL (ref 3.5–5.2)
ALP BLD-CCNC: 84 U/L (ref 35–104)
ALT SERPL-CCNC: 35 U/L (ref 0–32)
ANION GAP SERPL CALCULATED.3IONS-SCNC: 9 MMOL/L (ref 7–16)
AST SERPL-CCNC: 29 U/L (ref 0–31)
BASOPHILS ABSOLUTE: 0 E9/L (ref 0–0.2)
BASOPHILS RELATIVE PERCENT: 0.3 % (ref 0–2)
BILIRUB SERPL-MCNC: 0.7 MG/DL (ref 0–1.2)
BLOOD CULTURE, ROUTINE: ABNORMAL
BUN BLDV-MCNC: 34 MG/DL (ref 6–23)
CALCIUM SERPL-MCNC: 9.4 MG/DL (ref 8.6–10.2)
CHLORIDE BLD-SCNC: 106 MMOL/L (ref 98–107)
CO2: 26 MMOL/L (ref 22–29)
CREAT SERPL-MCNC: 0.9 MG/DL (ref 0.5–1)
EOSINOPHILS ABSOLUTE: 0 E9/L (ref 0.05–0.5)
EOSINOPHILS RELATIVE PERCENT: 0.1 % (ref 0–6)
GFR SERPL CREATININE-BSD FRML MDRD: >60 ML/MIN/1.73
GLUCOSE BLD-MCNC: 89 MG/DL (ref 74–99)
HCT VFR BLD CALC: 41.2 % (ref 34–48)
HEMOGLOBIN: 13.5 G/DL (ref 11.5–15.5)
LYMPHOCYTES ABSOLUTE: 1.57 E9/L (ref 1.5–4)
LYMPHOCYTES RELATIVE PERCENT: 8.6 % (ref 20–42)
MCH RBC QN AUTO: 29.5 PG (ref 26–35)
MCHC RBC AUTO-ENTMCNC: 32.8 % (ref 32–34.5)
MCV RBC AUTO: 90 FL (ref 80–99.9)
METAMYELOCYTES RELATIVE PERCENT: 0.9 % (ref 0–1)
MONOCYTES ABSOLUTE: 1.04 E9/L (ref 0.1–0.95)
MONOCYTES RELATIVE PERCENT: 6 % (ref 2–12)
MYELOCYTE PERCENT: 0.9 % (ref 0–0)
NEUTROPHILS ABSOLUTE: 14.79 E9/L (ref 1.8–7.3)
NEUTROPHILS RELATIVE PERCENT: 83.6 % (ref 43–80)
ORGANISM: ABNORMAL
PDW BLD-RTO: 13.6 FL (ref 11.5–15)
PLATELET # BLD: 380 E9/L (ref 130–450)
PMV BLD AUTO: 10.2 FL (ref 7–12)
POTASSIUM SERPL-SCNC: 4.6 MMOL/L (ref 3.5–5)
RBC # BLD: 4.58 E12/L (ref 3.5–5.5)
SODIUM BLD-SCNC: 141 MMOL/L (ref 132–146)
TOTAL PROTEIN: 5.6 G/DL (ref 6.4–8.3)
WBC # BLD: 17.4 E9/L (ref 4.5–11.5)

## 2022-12-13 PROCEDURE — 94640 AIRWAY INHALATION TREATMENT: CPT

## 2022-12-13 PROCEDURE — 2580000003 HC RX 258: Performed by: INTERNAL MEDICINE

## 2022-12-13 PROCEDURE — 97110 THERAPEUTIC EXERCISES: CPT

## 2022-12-13 PROCEDURE — 80053 COMPREHEN METABOLIC PANEL: CPT

## 2022-12-13 PROCEDURE — 97530 THERAPEUTIC ACTIVITIES: CPT

## 2022-12-13 PROCEDURE — 2060000000 HC ICU INTERMEDIATE R&B

## 2022-12-13 PROCEDURE — 36415 COLL VENOUS BLD VENIPUNCTURE: CPT

## 2022-12-13 PROCEDURE — 6370000000 HC RX 637 (ALT 250 FOR IP): Performed by: INTERNAL MEDICINE

## 2022-12-13 PROCEDURE — 97165 OT EVAL LOW COMPLEX 30 MIN: CPT

## 2022-12-13 PROCEDURE — 6360000002 HC RX W HCPCS: Performed by: INTERNAL MEDICINE

## 2022-12-13 PROCEDURE — 2700000000 HC OXYGEN THERAPY PER DAY

## 2022-12-13 PROCEDURE — 85025 COMPLETE CBC W/AUTO DIFF WBC: CPT

## 2022-12-13 PROCEDURE — 86481 TB AG RESPONSE T-CELL SUSP: CPT

## 2022-12-13 PROCEDURE — 2500000003 HC RX 250 WO HCPCS: Performed by: INTERNAL MEDICINE

## 2022-12-13 RX ADMIN — LEVOTHYROXINE SODIUM 88 MCG: 0.09 TABLET ORAL at 05:56

## 2022-12-13 RX ADMIN — BENZONATATE 100 MG: 100 CAPSULE ORAL at 20:46

## 2022-12-13 RX ADMIN — BENZONATATE 100 MG: 100 CAPSULE ORAL at 16:53

## 2022-12-13 RX ADMIN — PYRIDOXINE HCL TAB 50 MG 50 MG: 50 TAB at 10:44

## 2022-12-13 RX ADMIN — BRIMONIDINE TARTRATE 1 DROP: 2 SOLUTION OPHTHALMIC at 10:44

## 2022-12-13 RX ADMIN — DORZOLAMIDE HYDROCHLORIDE 1 DROP: 20 SOLUTION/ DROPS OPHTHALMIC at 10:44

## 2022-12-13 RX ADMIN — ANTI-FUNGAL POWDER MICONAZOLE NITRATE TALC FREE: 1.42 POWDER TOPICAL at 20:45

## 2022-12-13 RX ADMIN — ATORVASTATIN CALCIUM 10 MG: 10 TABLET, FILM COATED ORAL at 10:43

## 2022-12-13 RX ADMIN — LATANOPROST 1 DROP: 50 SOLUTION OPHTHALMIC at 21:00

## 2022-12-13 RX ADMIN — Medication 10 ML: at 20:46

## 2022-12-13 RX ADMIN — BENZONATATE 100 MG: 100 CAPSULE ORAL at 10:43

## 2022-12-13 RX ADMIN — PANTOPRAZOLE SODIUM 40 MG: 40 TABLET, DELAYED RELEASE ORAL at 05:56

## 2022-12-13 RX ADMIN — IPRATROPIUM BROMIDE AND ALBUTEROL 1 PUFF: 20; 100 SPRAY, METERED RESPIRATORY (INHALATION) at 06:58

## 2022-12-13 RX ADMIN — CEFTRIAXONE 2000 MG: 2 INJECTION, POWDER, FOR SOLUTION INTRAMUSCULAR; INTRAVENOUS at 16:54

## 2022-12-13 RX ADMIN — KETOTIFEN FUMARATE 1 DROP: 0.25 SOLUTION/ DROPS OPHTHALMIC at 20:40

## 2022-12-13 RX ADMIN — ASPIRIN 81 MG: 81 TABLET, COATED ORAL at 10:43

## 2022-12-13 RX ADMIN — DOXYCYCLINE 100 MG: 100 INJECTION, POWDER, LYOPHILIZED, FOR SOLUTION INTRAVENOUS at 16:57

## 2022-12-13 RX ADMIN — CLONIDINE HYDROCHLORIDE 0.05 MG: 0.1 TABLET ORAL at 10:44

## 2022-12-13 RX ADMIN — ANTI-FUNGAL POWDER MICONAZOLE NITRATE TALC FREE: 1.42 POWDER TOPICAL at 10:44

## 2022-12-13 RX ADMIN — IPRATROPIUM BROMIDE AND ALBUTEROL 1 PUFF: 20; 100 SPRAY, METERED RESPIRATORY (INHALATION) at 17:55

## 2022-12-13 RX ADMIN — CLONIDINE HYDROCHLORIDE 0.05 MG: 0.1 TABLET ORAL at 20:46

## 2022-12-13 RX ADMIN — APIXABAN 5 MG: 5 TABLET, FILM COATED ORAL at 20:46

## 2022-12-13 RX ADMIN — DORZOLAMIDE HYDROCHLORIDE 1 DROP: 20 SOLUTION/ DROPS OPHTHALMIC at 20:45

## 2022-12-13 RX ADMIN — IPRATROPIUM BROMIDE AND ALBUTEROL 1 PUFF: 20; 100 SPRAY, METERED RESPIRATORY (INHALATION) at 22:12

## 2022-12-13 RX ADMIN — DEXAMETHASONE SODIUM PHOSPHATE 6 MG: 10 INJECTION INTRAMUSCULAR; INTRAVENOUS at 10:43

## 2022-12-13 RX ADMIN — ZINC SULFATE 220 MG (50 MG) CAPSULE 50 MG: CAPSULE at 10:44

## 2022-12-13 RX ADMIN — GUAIFENESIN SYRUP AND DEXTROMETHORPHAN 10 ML: 100; 10 SYRUP ORAL at 16:53

## 2022-12-13 RX ADMIN — OXYCODONE HYDROCHLORIDE AND ACETAMINOPHEN 1000 MG: 500 TABLET ORAL at 10:44

## 2022-12-13 RX ADMIN — APIXABAN 5 MG: 5 TABLET, FILM COATED ORAL at 10:43

## 2022-12-13 RX ADMIN — BRIMONIDINE TARTRATE 1 DROP: 2 SOLUTION OPHTHALMIC at 20:35

## 2022-12-13 RX ADMIN — BARICITINIB 4 MG: 2 TABLET, FILM COATED ORAL at 10:43

## 2022-12-13 RX ADMIN — SODIUM CHLORIDE, PRESERVATIVE FREE 10 ML: 5 INJECTION INTRAVENOUS at 10:45

## 2022-12-13 RX ADMIN — IPRATROPIUM BROMIDE AND ALBUTEROL 1 PUFF: 20; 100 SPRAY, METERED RESPIRATORY (INHALATION) at 12:56

## 2022-12-13 RX ADMIN — KETOTIFEN FUMARATE 1 DROP: 0.25 SOLUTION/ DROPS OPHTHALMIC at 10:44

## 2022-12-13 RX ADMIN — DOXYCYCLINE 100 MG: 100 INJECTION, POWDER, LYOPHILIZED, FOR SOLUTION INTRAVENOUS at 05:11

## 2022-12-13 RX ADMIN — ISOSORBIDE MONONITRATE 30 MG: 30 TABLET, EXTENDED RELEASE ORAL at 10:43

## 2022-12-13 RX ADMIN — ATENOLOL 25 MG: 50 TABLET ORAL at 10:44

## 2022-12-13 RX ADMIN — DULOXETINE HYDROCHLORIDE 30 MG: 30 CAPSULE, DELAYED RELEASE ORAL at 10:43

## 2022-12-13 ASSESSMENT — PAIN SCALES - GENERAL
PAINLEVEL_OUTOF10: 0

## 2022-12-13 NOTE — PROGRESS NOTES
Physician Progress Note      Loy Herndon  CSN #:                  [id number]  :                       1940  ADMIT DATE:       2022 3:16 PM  DISCH DATE:  Isidro Evangelista  PROVIDER #:        Manning Harada DO          QUERY TEXT:    Pt admitted with Covid-19. Pt noted to have elevated WBC, decreased temp, RR >   20, and P >90. If possible, please document in the progress notes and   discharge summary if you are evaluating and /or treating any of the following: The medical record reflects the following:  Risk Factors: Covid-19, poss UTI, PNA  Clinical Indicators: WBC 25.3 - 17.4, RR 20 - 44, P 164 - 81, Temp 96.6F. Covid-19 positive - and ED provider noted \"Acute UTI\" and  IM PN   noted \"Pneumonia\". Treatment: Doxycycline, IV antibiotics, Blood cultures. Options provided:  -- Sepsis, present on admission due to covid-19  -- Sepsis, present on admission not due to covid-19  -- Covid-19 only, without Sepsis  -- Other - I will add my own diagnosis  -- Disagree - Not applicable / Not valid  -- Disagree - Clinically unable to determine / Unknown  -- Refer to Clinical Documentation Reviewer    PROVIDER RESPONSE TEXT:    This patient has sepsis which was present on admission due to covid-19.     Query created by: Bella Gutierrez on 2022 8:41 AM      Electronically signed by:  Manning Harada DO 2022 4:07 PM

## 2022-12-13 NOTE — CARE COORDINATION
SOCIAL WORK / DISCHARGE PLANNING:   COVID positive. Sw spoke with pt via phone, spouse also in room. PT/OT rec DINA and pt states she really doesn't want to go but if she has to then she will. Hx [employer]. Sw offered DINA list but pt declined as she resides in [facility] and had positive experience at facility and agrees to a referral. Sw faxed referral to [address] [place] [address], await review. NO PRECERT needed. Pt on IV Rocephin and IV Doxycyline, will need IV plan for dc. DELIA will need signed by physician. HENS form will need completed prior to dc. 2-3L O2, bipap at night.              Electronically signed by VICKY Long on 12/13/2022 at 2:41 PM

## 2022-12-13 NOTE — DISCHARGE INSTR - COC
Continuity of Care Form    Patient Name: Brian Matt   :  1940  MRN:  [id number]    Admit date:  2022  Discharge date:  22    Code Status Order: Full Code   Advance Directives:     Admitting Physician:  Madeline Coelho DO  PCP: Fili Faith DO    Discharging Nurse: Darren Gray Unit/Room#: 9530/4780-77  Discharging Unit Phone Number: [phone]    Emergency Contact:   Extended Emergency Contact Information  Primary Emergency Contact: Mack Moura  Address: [address] [place] [id number] [address] Phone: [phone]  Mobile Phone: [phone]  Relation: Spouse  Hearing or visual needs: None  Other needs: None  Preferred language: English   needed? No  Secondary Emergency Contact: [address] Phone: [phone]  Mobile Phone: [phone]  Relation: Other  Hearing or visual needs: None  Other needs: None  Preferred language: English   needed?  No    Past Surgical History:  Past Surgical History:   Procedure Laterality Date    CATARACT REMOVAL WITH IMPLANT  2011    left eye    CHOLECYSTECTOMY, LAPAROSCOPIC      COLONOSCOPY      CYSTOSCOPY  2012    cystoscopy, right ureteroscopy with retrograde pyelograms, stone manipulation, stent insertion, laser lithotripsy with holmium laser    HYSTERECTOMY (CERVIX STATUS UNKNOWN)      VT GLAUCOMA SURG,TRABECU AB EXTERNO Right 11/15/2018    RIGHT EYE REVISION OF XEN EYE STENT, TRABECULECTOMY performed by Lisette Chinchilla MD at [facility] (CERVIX REMOVED)  [facility]    for RLE DVT       Immunization History:   Immunization History   Administered Date(s) Administered    COVID-19, PFIZER GRAY top, DO NOT Dilute, (age 15 y+), IM, 30 mcg/0.3 mL 04/15/2022    COVID-19, PFIZER PURPLE top, DILUTE for use, (age 15 y+), 30mcg/0.3mL 2021, 2021, 10/15/2021 Influenza, High Dose (Fluzone 65 yrs and older) 10/17/2018    Pneumococcal Conjugate 13-valent (Chelsey Hickman) 10/17/2018       Active Problems:  Patient Active Problem List   Diagnosis Code    Acute renal failure (ARF) ([facility] N17.9    Pyelonephritis N12    Obstructive uropathy N13.9    HTN (hypertension) I10    Hyperlipidemia E78.5    Obstructive sleep apnea G47.33    Glaucoma H40.9    DJD (degenerative joint disease) M19.90    Morbid obesity ([facility] E66.01    Kidney stone N20.0    Pneumonia J18.9    Moderate protein-calorie malnutrition (HCC) E44.0    Atrial fibrillation with rapid ventricular response ([facility] I48.91    Pneumonia due to organism J18.9       Isolation/Infection:   Isolation            Droplet Plus          Patient Infection Status       Infection Onset Added Last Indicated Last Indicated By Review Planned Expiration Resolved Resolved By    COVID-19 11/28/22 11/28/22 12/09/22 COVID-19, Rapid 12/19/22 12/23/22      Resolved    COVID-19 (Rule Out) 11/28/22 11/28/22 11/28/22 COVID-19, Rapid (Ordered)   11/28/22 Rule-Out Test Resulted            Nurse Assessment:  Last Vital Signs: /78   Pulse 84   Temp 98 °F (36.7 °C) (Oral)   Resp 22   Ht 5' 5\" (1.651 m)   Wt (!) 343 lb (155.6 kg)   SpO2 95%   BMI 57.08 kg/m²     Last documented pain score (0-10 scale): Pain Level: 0  Last Weight:   Wt Readings from Last 1 Encounters:   12/10/22 (!) 343 lb (155.6 kg)     Mental Status:  oriented and alert    IV Access:  - None    Nursing Mobility/ADLs:  Walking   Dependent  Transfer  Dependent  Bathing  Dependent  Dressing  Dependent  Toileting  Dependent  Feeding  Assisted  Med Admin  Assisted  Med Delivery   whole    Wound Care Documentation and Therapy:  Wound 12/10/22 Abdomen Left; Lower;Medial small open area in the left side abdominal folds.  (Active)   Dressing Status New dressing applied 12/13/22 1030   Wound Cleansed Cleansed with saline 12/13/22 1030   Dressing/Treatment Foam 12/13/22 1030   Dressing Change Due 12/13/22 12/12/22 2000   Wound Length (cm) 3 cm 12/10/22 2030   Wound Width (cm) 2.5 cm 12/10/22 2030   Wound Depth (cm) 0.01 cm 12/10/22 2030   Wound Surface Area (cm^2) 7.5 cm^2 12/10/22 2030   Wound Volume (cm^3) 0.075 cm^3 12/10/22 2030   Wound Assessment Pink/red 12/13/22 1030   Drainage Amount Small 12/13/22 1030   Drainage Description Serosanguinous 12/13/22 1030   Odor None 12/13/22 1030   Sylvia-wound Assessment Warm;Blanchable erythema 12/13/22 1030   Number of days: 2        Elimination:  Continence: Bowel: yes  Bladder: Yes  Urinary Catheter: Insertion Date: 12/9/22    Colostomy/Ileostomy/Ileal Conduit: No       Date of Last BM: ***    Intake/Output Summary (Last 24 hours) at 12/13/2022 1525  Last data filed at 12/13/2022 1457  Gross per 24 hour   Intake --   Output 1650 ml   Net -1650 ml     I/O last 3 completed shifts: In: 5 [P.O.:410; I.V.:10]  Out: 2300 [Urine:2300]    Safety Concerns: At Risk for Falls    Impairments/Disabilities:      None    Nutrition Therapy:  Current Nutrition Therapy:   - Oral Diet:  General    Routes of Feeding: Oral  Liquids: Thin Liquids  Daily Fluid Restriction: no  Last Modified Barium Swallow with Video (Video Swallowing Test): not done    Treatments at the Time of Hospital Discharge:   Respiratory Treatments: ***  Oxygen Therapy:  is on oxygen at 2 L/min per nasal cannula.   Ventilator:    - No ventilator support    Rehab Therapies: Physical Therapy and Occupational Therapy  Weight Bearing Status/Restrictions: No weight bearing restrictions  Other Medical Equipment (for information only, NOT a DME order):  ***  Other Treatments: ***    Patient's personal belongings (please select all that are sent with patient):  ***    RN SIGNATURE:  Electronically signed by Nu Adler RN on 12/14/22 at 3:45 PM EST    CASE MANAGEMENT/SOCIAL WORK SECTION    Inpatient Status Date: 12/9/22    Readmission Risk Assessment Score:  Readmission Risk              Risk of Unplanned Readmission:  21           Discharging to Facility/ Agency   Name: [facility], [address], Fax [phone],   Address:  Phone:  Fax:    Dialysis Facility (if applicable)   Name:  Address:  Dialysis Schedule:  Phone:  Fax:    / signature: Electronically signed by VICKY Bingham on 12/14/22 at 11:00 AM EST    PHYSICIAN SECTION    Prognosis: Good    Condition at Discharge: Stable    Rehab Potential (if transferring to Rehab): {Prognosis:[id number]}    Recommended Labs or Other Treatments After Discharge: ***    Physician Certification: I certify the above information and transfer of Elie Cannon  is necessary for the continuing treatment of the diagnosis listed and that she requires [place] for less 30 days.      Update Admission H&P: {CHP DME Changes in OSPFW:[id number]}    PHYSICIAN SIGNATURE:  Electronically signed by Caitlyn Trevino DO on 12/13/22 at 3:25 PM EST

## 2022-12-13 NOTE — PROGRESS NOTES
Department of Internal Medicine  PN    PCP: María Elena Corral DO  Admitting Physician: Dr. Liam Clay  Consultants:   Date of Service: 12/9/2022    CHIEF COMPLAINT:  weakness    HISTORY OF PRESENT ILLNESS:    Patient is an 66-year-old female who presented to the ED with generalized weakness and fatigue for the last few days. Patient denies any shortness of breath or cough. She denies any subjective fever or chills. She denies any dysuria. patient did obtain the COVID-19 vaccination    12/10/2022  Patient seen examined on monitored floor. Patient's  is at the bedside and case discussed. Patient thinks she feels a little bit better but still very very weak. Creatinine improving with currently BUN/creatinine 55/1.0 with a creatinine 1.3 on admission. Transaminases normal with WBC still elevated 22.5 with hemoglobin 15.7. CRP is improved to 7.2 with D-dimer 319. Temperature 97.7 with a heart rate 92 with blood pressure 140/113. O2 sat 96% on 6 L nasal cannula. Patient still tachycardic. We will put her back on her clonidine but switch to oral form. 12/11/2022  Patient seen examined on [facility]. Case discussed with patient's nurse today. Patient has no peripheral IV sites for lab work. Patient is also complaining of increasing cough nonproductive cough. Patient's  is at the bedside and case discussed. Temperature 98.2 with heart rate 84 and blood pressure 120/60. O2 sat 95% on 2 L nasal cannula. 12/12/2022  Patient seen examined on [facility]. Patient's  is at the bedside and case discussed. The patient denies any problem with any chest or abdominal pain this time. The patient does feel short of breath with activity. Patient denies any fever/chills or productive cough. BUN/creatinine was 39/0.9 normal electrolytes. Transaminases normal with WBC is 19.9 hemoglobin 13.4. Temperature is 97.9 with a heart rate of 94 blood pressure 121/70. O2 sat 94% on 4 L nasal cannula.   Urine output is fairly good.    2022  Patient seen examined on [address]. Patient has consented to go to nursing home. BUN/creatinine 34/0.9 normal electrolytes. Liver enzymes essentially normal WBC still elevated but slowly improving 17.4 with hemoglobin 13.5. Temperature is 98 with heart rate 84 blood pressure 119/78. O2 sat 95% on 2 L nasal cannula. Urine output is good. Transfer to extended-care facility when bed is available      PAST MEDICAL Hx:  Past Medical History:   Diagnosis Date    Cataract     right eye    Cholecystitis with cholelithiasis     Diverticulitis     Diverticulosis     DJD (degenerative joint disease)     DVT (deep venous thrombosis) (HCC)     RLE    Fibroids     Glaucoma     HTN (hypertension)     Hyperlipidemia     states on med for precaution    Morbid obesity ([facility] [address]     GLENNA on CPAP     Osteoarthritis     PE (pulmonary embolism)     Renal cyst     right       PAST SURGICAL Hx:   Past Surgical History:   Procedure Laterality Date    CATARACT REMOVAL WITH IMPLANT  2011    left eye    CHOLECYSTECTOMY, LAPAROSCOPIC      COLONOSCOPY      CYSTOSCOPY  2012    cystoscopy, right ureteroscopy with retrograde pyelograms, stone manipulation, stent insertion, laser lithotripsy with holmium laser    HYSTERECTOMY (CERVIX STATUS UNKNOWN)      [place] GLAUCOMA SURG,TRABECU AB EXTERNO Right 11/15/2018    RIGHT EYE REVISION OF XEN EYE STENT, TRABECULECTOMY performed by Mohammed Scheuermann, MD at [facility] (CERVIX REMOVED)  [facility]    for RLE DVT       FAMILY Hx:  Family History   Problem Relation Age of Onset    Cancer Brother          - lung cancer    COPD Father     Coronary Art Dis Father     Heart Attack Mother 62        sudden death       HOME MEDICATIONS:  Prior to Admission medications    Medication Sig Start Date End Date Taking?  Authorizing Provider   atenolol (TENORMIN) 25 MG tablet Take 1 tablet by mouth daily 22 Marla Yates DO   ascorbic acid (VITAMIN C) 1000 MG tablet Take 1 tablet by mouth daily 12/1/22   Marla Yates DO   apixaban (ELIQUIS) 5 MG TABS tablet Take 1 tablet by mouth 2 times daily 11/30/22   Marla Yates DO   potassium citrate (UROCIT-K) 10 MEQ (1080 MG) extended release tablet Take by mouth daily    Historical Provider, MD   DULoxetine (CYMBALTA) 30 MG extended release capsule Take 30 mg by mouth daily    Historical Provider, MD   isosorbide mononitrate (IMDUR) 30 MG extended release tablet Take 30 mg by mouth daily    Historical Provider, MD   zinc gluconate 50 MG tablet Take 50 mg by mouth daily    Historical Provider, MD [place]-Glucosamine-Vit D (OSTEO BI-FLEX ONE PER DAY PO) Take 1 tablet by mouth daily    Historical Provider, MD   sodium chloride (CANDY 128) 5 % ophthalmic solution Place 1 drop into both eyes 4 times daily    Historical Provider, MD   latanoprost (XALATAN) 0.005 % ophthalmic solution Place 1 drop into the left eye nightly    Historical Provider, MD   dorzolamide (TRUSOPT) 2 % ophthalmic solution Place 1 drop into the left eye in the morning and at bedtime    Historical Provider, MD   brimonidine (ALPHAGAN) 0.2 % ophthalmic solution Place 1 drop into both eyes in the morning and at bedtime    Historical Provider, MD   olopatadine (PATADAY) 0.2 % SOLN ophthalmic solution Place 1 drop into both eyes daily    Historical Provider, MD   levothyroxine (SYNTHROID) 88 MCG tablet Take 1 tablet by mouth Daily 4/24/19   Cristina Coleman DO   pantoprazole (PROTONIX) 40 MG tablet Take 1 tablet by mouth every morning (before breakfast) 4/24/19   Cristina Coleman, DO   oxybutynin (DITROPAN) 5 MG tablet Take 1 tablet by mouth 2 times daily 4/23/19   Cristina Coleman,    hydrocortisone 2.5 % cream Apply topically 2 times daily.   Patient taking differently: Apply 1 application topically 2 times daily Apply topically 2 times daily to where skin rash noted 4/23/19   Cristina Coleman,    Psyllium (METAMUCIL FIBER PO) Take by mouth 2 times daily    Historical Provider, MD   aspirin 81 MG EC tablet Take 81 mg by mouth daily Pt instructed to check hold with dr. Mckay Feeling Provider, MD   [facility] OIL Take 1,000 mg by mouth daily Ld 2018  Patient not taking: Reported on 2022    Historical Provider, MD   Docusate Sodium (DOCULASE PO) Take 100 mg by mouth in the morning and at bedtime    Historical Provider, MD   amlodipine (NORVASC) 2.5 MG tablet Take 1 tablet by mouth daily. 12   Mirella Smith DO   clonidine (CATAPRES) 0.3 MG/24HR Place 1 patch onto the skin once a week Indications: saturday     Historical Provider, MD   atorvastatin (LIPITOR) 10 MG tablet Take 10 mg by mouth daily. Historical Provider, MD   nitroGLYCERIN (NITROSTAT) 0.4 MG SL tablet Place 0.4 mg under the tongue every 5 minutes as needed.       Historical Provider, MD       ALLERGIES:  Pcn [penicillins] and Noroxin [norfloxacin]    SOCIAL Hx:  Social History     Socioeconomic History    Marital status:      Spouse name: Not on file    Number of children: Not on file    Years of education: Not on file    Highest education level: Not on file   Occupational History    Occupation: parminder     Comment: retired    Tobacco Use    Smoking status: Former     Packs/day: 1.00     Years: 30.00     Pack years: 30.00     Types: Cigarettes     Quit date: 1991     Years since quittin.6    Smokeless tobacco: Never   Vaping Use    Vaping Use: Never used   Substance and Sexual Activity    Alcohol use: No    Drug use: No    Sexual activity: Not on file   Other Topics Concern    Not on file   Social History Narrative    Not on file     Social Determinants of Health     Financial Resource Strain: Not on file   Food Insecurity: Not on file   Transportation Needs: Not on file   Physical Activity: Not on file   Stress: Not on file   Social Connections: Not on file   Intimate Partner Violence: Not on file   Housing Stability: Not on file       ROS: Positive in bold  General:   Denies chills, fatigue, fever, malaise, night sweats or weight loss    Psychological:   Denies anxiety, disorientation or hallucinations    ENT:    Denies epistaxis, headaches, vertigo or visual changes    Cardiovascular:   Denies any chest pain, irregular heartbeats, or palpitations. No paroxysmal nocturnal dyspnea. Respiratory:   Denies shortness of breath, coughing, sputum production, hemoptysis, or wheezing. No orthopnea. Gastrointestinal:   Denies nausea, vomiting, diarrhea, or constipation. Denies any abdominal pain. Denies change in bowel habits or stools. Genito-Urinary:    Denies any urgency, frequency, hematuria. Voiding without difficulty. Musculoskeletal:   Denies joint pain, joint stiffness, joint swelling or muscle pain    Neurology:    Denies any headache or focal neurological deficits. No weakness or paresthesia. Derm:    Denies any rashes, ulcers, or excoriations. Denies bruising. Extremities:   Denies any lower extremity swelling or edema. PHYSICAL EXAM: Abnormal findings noted  VITALS:  Vitals:    12/13/22 1500   BP: 119/78   Pulse: 84   Resp: 22   Temp: 98 °F (36.7 °C)   SpO2: 95%         CONSTITUTIONAL:    Awake, alert, cooperative, no apparent distress, and appears stated age    EYES:    EOMI, sclera clear, conjunctiva normal    ENT:    Normocephalic, atraumatic, External ears without lesions. NECK:    Supple, symmetrical, trachea midline, no JVD    HEMATOLOGIC/LYMPHATICS:    No cervical lymphadenopathy and no supraclavicular lymphadenopathy    LUNGS:    Symmetric.  No increased work of breathing, good air exchange, clear to auscultation bilaterally, no wheezes, rhonchi, or rales,   Patient has coarse breath sounds and rhonchi bilaterally    CARDIOVASCULAR:    Normal apical impulse, regular rate and rhythm, normal S1 and S2, no S3 or S4, and no murmur noted    ABDOMEN:    soft, non-distended, non-tender    MUSCULOSKELETAL:    There is no redness, warmth, or swelling of the joints. NEUROLOGIC:    Awake, alert, oriented to name, place and time. SKIN:    No bruising or bleeding. No redness, warmth, or swelling    EXTREMITIES:    Peripheral pulses present. No edema, cyanosis, or swelling.     LINES/CATHETERS     LABORATORY DATA:  CBC with Differential:    Lab Results   Component Value Date/Time    WBC 17.4 12/13/2022 05:33 AM    RBC 4.58 12/13/2022 05:33 AM    HGB 13.5 12/13/2022 05:33 AM    HCT 41.2 12/13/2022 05:33 AM     12/13/2022 05:33 AM    MCV 90.0 12/13/2022 05:33 AM    MCH 29.5 12/13/2022 05:33 AM    MCHC 32.8 12/13/2022 05:33 AM    RDW 13.6 12/13/2022 05:33 AM    SEGSPCT 55 05/24/2012 02:10 PM    METASPCT 0.9 12/13/2022 05:33 AM    LYMPHOPCT 8.6 12/13/2022 05:33 AM    MONOPCT 6.0 12/13/2022 05:33 AM    MYELOPCT 0.9 12/13/2022 05:33 AM    BASOPCT 0.3 12/13/2022 05:33 AM    MONOSABS 1.04 12/13/2022 05:33 AM    LYMPHSABS 1.57 12/13/2022 05:33 AM    EOSABS 0.00 12/13/2022 05:33 AM    BASOSABS 0.00 12/13/2022 05:33 AM     CMP:    Lab Results   Component Value Date/Time     12/13/2022 05:33 AM    K 4.6 12/13/2022 05:33 AM    K 4.0 12/09/2022 04:29 PM     12/13/2022 05:33 AM    CO2 26 12/13/2022 05:33 AM    BUN 34 12/13/2022 05:33 AM    CREATININE 0.9 12/13/2022 05:33 AM    GFRAA >60 04/23/2019 06:15 AM    LABGLOM >60 12/13/2022 05:33 AM    GLUCOSE 89 12/13/2022 05:33 AM    GLUCOSE 105 05/24/2012 02:10 PM    PROT 5.6 12/13/2022 05:33 AM    LABALBU 2.8 12/13/2022 05:33 AM    LABALBU 3.9 05/24/2012 02:10 PM    CALCIUM 9.4 12/13/2022 05:33 AM    BILITOT 0.7 12/13/2022 05:33 AM    ALKPHOS 84 12/13/2022 05:33 AM    AST 29 12/13/2022 05:33 AM    ALT 35 12/13/2022 05:33 AM       ASSESSMENT/PLAN:  Acute hypoxic respiratory failure  COVID-19 with sepsis  Pneumonia  Mild aortic stenosis  Pulmonary hypertension  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 Patient found to to be acutely hypoxic. Patient x-ray revealed pneumonia. COVID-19 came back positive. Patient states that she has been vaccinated. Patient placed on IV steroids as well as Combivent. Patient started on IV antibiotics as well. Patient counseled on taking deep breaths and incentive spirometry use. Continue to monitor pulse ox and wean off supplemental oxygen as tolerated. Home medication reviewed  Consult pharmacy for COVID protocol  Combivent Respimat 1 puff 4 times daily  Decadron 6 mg IV push daily  IV Rocephin 1 g daily  Doxycycline 100 mg IV piggyback twice daily    Consult IR for midline-no peripheral IV access  Tessalon Perles 100 mg 3 times daily  Robitussin-DM 10 cc every 4 hours as needed for cough    Transfer to extended-care facility for further rehab when bed available    BMP, CBC in a.m.       Hyacinth Hines DO  3:23 PM  12/13/2022

## 2022-12-13 NOTE — PROGRESS NOTES
Physical Therapy Treatment Note/Plan of Care    Room #:  3833/4135-45  Patient Name: Angela To  YOB: 1940  MRN: [id number]    Date of Service: 12/13/2022     Tentative placement recommendation: Subacute rehab  Equipment recommendation: Bedside commode      Evaluating Physical Therapist: Keyon Roberts #[id number]      Specific Provider Orders/Date/Referring Provider :  12/12/22 1345    PT eval and treat  Start:  12/12/22 1345,   End:  12/12/22 1345,   ONE TIME,   Standing Count:  1 Occurrences,   R         [facility], DO     Admitting Diagnosis:   Pneumonia due to organism [J18.9]  Acute UTI [N39.0]  Atrial fibrillation with RVR ([address] [I48.91]  Acute respiratory failure with hypoxia ([address] [J96.01]  Pneumonia due to infectious organism, unspecified laterality, unspecified part of lung [J18.9]     generalized fatigue  Surgery: none  Visit Diagnoses         Codes    Acute respiratory failure with hypoxia ([address]     J96.01    Acute UTI     N39.0    Atrial fibrillation with RVR ([address]     I48.91            Patient Active Problem List   Diagnosis    Acute renal failure (ARF) ([address]    Pyelonephritis    Obstructive uropathy    HTN (hypertension)    Hyperlipidemia    Obstructive sleep apnea    Glaucoma    DJD (degenerative joint disease)    Morbid obesity ([address]    Kidney stone    Pneumonia    Moderate protein-calorie malnutrition ([address]    Atrial fibrillation with rapid ventricular response ([address]    Pneumonia due to organism        ASSESSMENT of Current Deficits Patient exhibits decreased strength, balance, and endurance impairing functional mobility, transfers, gait , gait distance, and tolerance to activity. Patient needing maximal assist for supine to sit. Pt fatigues quickly, O2 desaturation with minimal exertion, increased HR, and impaired strength/endurance: all factors that limit increased functional activity. Pt needing her O2 increased from 2L to 4L to stay at 90% and higher while working with therapy. Patient requires skilled physical therapy to address concerns listed above to increase safety and independence at discharge. PHYSICAL THERAPY  PLAN OF CARE       Physical therapy plan of care is established based on physician order,  patient diagnosis and clinical assessment    Current Treatment Recommendations:    -Bed Mobility: Lower extremity exercises  and Trunk control activities   -Sitting Balance: Incorporate reaching activities to activate trunk muscles   -Standing Balance: Perform strengthening exercises in standing to promote motor control with or without upper extremity support   -Transfers: Provide instruction on proper hand and foot position for adequate transfer of weight onto lower extremities and use of gait device if needed, Cues for hand placement, technique and safety. Provide stabilization to prevent fall , and Assist with extension of knees trunk and hip to accept weight transfer   -Gait: Gait training, Standing activities to improve: base of support, weight shift, weight bearing , and Pregait training to emphasize: Sequencing , Base of support, Weight shift, Upright, and Safety   -Endurance: Utilize Supervised activities to increase level of endurance to allow for safe functional mobility including transfers and gait     PT long term treatment goals are located in below grid    Patient and or family understand(s) diagnosis, prognosis, and plan of care. Frequency of treatments: Patient will be seen  daily.          Prior Level of Function: Patient ambulated with wheeled walker  reports she has not been able to walk for past 3 days   Rehab Potential: good   for baseline    Past medical history:   Past Medical History:   Diagnosis Date    Cataract     right eye    Cholecystitis with cholelithiasis     Diverticulitis     Diverticulosis     DJD (degenerative joint disease)     DVT (deep venous thrombosis) (HCC)     RLE    Fibroids     Glaucoma     HTN (hypertension) Hyperlipidemia     states on med for precaution    Morbid obesity ([facility] [address]     GLENNA on CPAP     Osteoarthritis     PE (pulmonary embolism)     Renal cyst     right     Past Surgical History:   Procedure Laterality Date    CATARACT REMOVAL WITH IMPLANT  12/2011    left eye    CHOLECYSTECTOMY, [address]  jun 2012    cystoscopy, right ureteroscopy with retrograde pyelograms, stone manipulation, stent insertion, laser lithotripsy with holmium laser    HYSTERECTOMY (CERVIX STATUS UNKNOWN)      [place] GLAUCOMA SURG,TRABECU AB EXTERNO Right 11/15/2018    RIGHT EYE REVISION OF XEN EYE STENT, TRABECULECTOMY performed by Felisha Cedeño MD at [facility] (CERVIX REMOVED)  [facility]    for RLE DVT       SUBJECTIVE:    Precautions: Up with assistance and Check Pulse Oximetry while ambulating , falls, O2, and Droplet plus/COVID-19 ,      Imaging results: XR CHEST (2 VW)    Result Date: 11/28/2022  EXAMINATION: TWO XRAY VIEWS OF THE CHEST 11/28/2022 12:08 pm    Suspect mild pulmonary vascular congestion. XR CHEST PORTABLE    Result Date: 12/9/2022  EXAMINATION: ONE XRAY VIEW OF THE CHEST 12/9/2022 4:10 pm    Right upper lobe opacity worrisome for pneumonia. CT CHEST HIGH RESOLUTION    Result Date: 11/29/2022  EXAMINATION: CT IMAGES OF THE CHEST WITHOUT CONTRAST, HIGH RESOLUTION 11/29/2022      No chronic or acute pulmonary process specifically, no discrete evidence for interstitial lung disease with minimal atelectasis or scarring at the left lung base may be from prior bout of airspace disease. No fibrotic change.        Social history: Patient lives with spouse in a duplex  with Ramp  to enter  Walk in shower       Equipment owned: Wheelchair, [address], [address] [facility] [employer] [address] chair, and Electric wheelchair,       [address] [facility]   How much difficulty turning over in bed?: A Lot  How much difficulty sitting down on / standing up from a chair with arms?: Unable  How much difficulty moving from lying on back to sitting on side of bed?: A Lot  How much help from another person moving to and from a bed to a chair?: Total  How much help from another person needed to walk in hospital room?: Total  How much help from another person for climbing 3-5 steps with a railing?: Total  AM-PAC Inpatient Mobility Raw Score : 8  AM-PAC Inpatient T-Scale Score : 28.52  Mobility Inpatient CMS 0-100% Score: 86.62  Mobility Inpatient CMS G-Code Modifier : CM    Nursing cleared patient for PT treatment. OBJECTIVE;   Initial Evaluation  Date: 12/12/2022 Treatment Date:  12/13/2022       Short Term/ Long Term   Goals   Was pt agreeable to Eval/treatment? Yes yes To be met in 5 days   Pain level   0/10    0/10    Bed Mobility    Rolling: Maximal assist of 1    Supine to sit: Maximal assist of 1    Sit to supine: Maximal assist of  2    Scooting: Maximal assist of  2   Rolling: Maximal assist of 1   Supine to sit: Maximal assist of 1   Sit to supine: Maximal assist of 1   Scooting: Maximal assist of 1    Rolling: Moderate assist of 1    Supine to sit: Moderate assist of 1    Sit to supine: Moderate assist of 1    Scooting: Moderate assist of 1     Transfers Sit to stand: Not assessed  due to pt overall debility, decreased activity tolerance, balance deficits, safety and fall risk. Sit to stand: Not assessed     Sit to stand:  Moderate assist of 1     Ambulation    not assessed  not assessed      20 feet using  wheeled walker with Moderate assist of 1    Stair negotiation: ascended and descended   Not assessed          ROM Within functional limits    Increase range of motion 10% of affected joints    Strength BUE:  refer to OT eval  RLE:  3 3+/5  LLE:  3 3+/5  Increase strength in affected mm groups by 1/3 grade   Balance Sitting EOB:  good -  Dynamic Standing:  not assessed   Sitting EOB: good   Dynamic Standing: not assessed    Sitting EOB:  good    Dynamic Standing: fair +wheeled walker      Patient is Alert & Oriented x person, place, time, and situation and follows directions    Sensation:  Patient  denies numbness/tingling   Edema:  yes bilateral lower extremities   Endurance: fair  +    Vitals:  2 liters high flow nasal cannula   Blood Pressure at rest  Blood Pressure during session    Heart Rate at rest 111-138 Heart Rate during session 120-151   SPO2 at rest 94%  SPO2 during session 87% on 2L; increased to 4L 90%     Patient education  Patient educated on role of Physical Therapy, risks of immobility, safety and plan of care,  importance of mobility while in hospital , ankle pumps, quad set and glut set for edema control, blood clot prevention, positioning for skin integrity and comfort, and proper use/technique of incentive spirometer     Patient response to education:   Pt verbalized understanding Pt demonstrated skill Pt requires further education in this area   Yes Partial Yes      Treatment:  Patient practiced and was instructed/facilitated in the following treatment: Patient assisted to edge of bed,   Sat edge of bed 30 minutes with Supervision  to increase dynamic sitting balance and activity tolerance. Education/performance of incentive inspirometer , performed BLE & BUE exercises. OT assisted with BUE exercises. Therapeutic Exercises:  ankle pumps, hip abduction/adduction, and long arc quad,  x 20 reps. AROM      At end of session, patient sitting edge of bed with spouse present call light and phone within reach,  all lines and tubes intact, nursing notified. Patient would benefit from continued skilled Physical Therapy to improve functional independence and quality of life.          Patient's/ family goals   home    Time in  11:15  Time out 11:53    Total Treatment Time  38 minutes        CPT codes:    Therapeutic activities (77218)   15 minutes  1 unit(s)  Therapeutic exercises (64973) 12 minutes  1 unit(s)  Non billable time 11 minutes co-treated with OT    Terrence Masters.  Ida  [facility]  LIC # [id number]

## 2022-12-13 NOTE — PROGRESS NOTES
[address] [facility]  [facility] Jessenia Jose Rafael. [place]        Date:2022                                                  Patient Name: Jace Augustine    MRN: [id number]    : 1940    Room: [address] [place] [address]      Evaluating OT: Gagan Hassan OTR/L #[id number]     Referring Provider and Specific Provider Orders/Date:      22 1345  OT eval and treat  Start:  22 1345,   End:  22 134,   ONE TIME,   Standing Count:  1 Occurrences,   R         Estel Héctor, DO      Placement Recommendation: Subacute Rehab        Diagnosis:   1. Pneumonia due to infectious organism, unspecified laterality, unspecified part of lung    2. Acute respiratory failure with hypoxia (HCC)    3. Acute UTI    4.  Atrial fibrillation with RVR ([facility] [address]         Surgery: None       Pertinent Medical History:       Past Medical History:   Diagnosis Date    Cataract     right eye    Cholecystitis with cholelithiasis     Diverticulitis     Diverticulosis     DJD (degenerative joint disease)     DVT (deep venous thrombosis) ([address]     RLE    Fibroids     Glaucoma     HTN (hypertension)     Hyperlipidemia     states on med for precaution    Morbid obesity ([address]     GLENNA on CPAP     Osteoarthritis     PE (pulmonary embolism)     Renal cyst     right         Past Surgical History:   Procedure Laterality Date    CATARACT REMOVAL WITH IMPLANT  2011    left eye    CHOLECYSTECTOMY, [address]  2012    cystoscopy, right ureteroscopy with retrograde pyelograms, stone manipulation, stent insertion, laser lithotripsy with holmium laser    HYSTERECTOMY (CERVIX STATUS UNKNOWN)      [place] GLAUCOMA SURG,TRABECU AB EXTERNO Right 11/15/2018    RIGHT EYE REVISION OF XEN EYE STENT, TRABECULECTOMY performed by Jacey Connor MD at [facility] (CERVIX REMOVED)  [address] [place] [address] Tricia    for RLE DVT      Precautions:  Fall Risk, up with assistance, droplet plus isolation, COVID positive, 2L O2 via nasal canula (new O2 user), obesity      Assessment of current deficits    [x] Functional mobility  [x]ADLs  [x] Strength               []Cognition    [x] Functional transfers   [x] IADLs         [x] Safety Awareness   [x]Endurance    [] Fine Coordination              [x] Balance      [] Vision/perception   []Sensation     []Gross Motor Coordination  [] ROM  [] Delirium                   [] Motor Control     OT PLAN OF CARE   OT POC based on physician orders, patient diagnosis and results of clinical assessment    Frequency/Duration 1-3 days/wk for 2 weeks PRN     Specific OT Treatment Interventions to include:   * Instruction/training on adapted ADL techniques and AE recommendations to increase functional independence within precautions       * Training on energy conservation strategies, correct breathing pattern and techniques to improve independence/tolerance for self-care routine  * Functional transfer/mobility training/DME recommendations for increased independence, safety, and fall prevention  * Patient/Family education to increase follow through with safety techniques and functional independence  * Recommendation of environmental modifications for increased safety with functional transfers/mobility and ADLs  * Therapeutic exercise to improve motor endurance, ROM, and functional strength for ADLs/functional transfers  * Therapeutic activities to facilitate/challenge dynamic balance, stand tolerance for increased safety and independence with ADLs  * Therapeutic activities to facilitate gross/fine motor skills for increased independence with ADLs  * Positioning to improve skin integrity, interaction with environment and functional independence  * Manual techniques for edema management    Recommended Adaptive Equipment: TBD      Home Living: Lives with spouse, spouse in a duplex  with Ramp  to enter  Walk in shower        Equipment owned: Wheelchair, [address], [address] chair, and Electric wheelchair    Prior Level of Function: spouse assists with ADLs as needed and completes all IADLs; ambulated independently with wheeled walker but reportedly hasn't been able to walk for the last week. Pain Level: pt denied pain; Nursing notified. Cognition: A&O: 4/4; Follows 3 step directions   Memory: intact   Sequencing: intact   Problem solving: fair    Judgement/safety: fair     [facility]   AM-PAC Daily Activity Inpatient   How much help for putting on and taking off regular lower body clothing?: Total  How much help for Bathing?: A Lot  How much help for Toileting?: Total  How much help for putting on and taking off regular upper body clothing?: A Lot  How much help for taking care of personal grooming?: A Lot  How much help for eating meals?: A Little  AM-[facility] Inpatient Daily Activity Raw Score: 11  AM-PAC Inpatient ADL T-Scale Score : 29.04  ADL Inpatient CMS 0-100% Score: 70.42  ADL Inpatient CMS G-Code Modifier : CL     Functional Assessment:    Initial Eval Status  Date: 12/13/22   Treatment Status  Date: STGs = LTGs  Time frame: 10-14 days   Feeding Supervision     Independent    Grooming Moderate Assist   For hair groom d/t decreased ROM reaching overhead    Supervision    UB Dressing Moderate Assist    Supervision    LB Dressing Dependent     Moderate Assist    Bathing Maximal Assist     Minimal Assist    Toileting Dependent   For nice mgmt     Moderate Assist    Bed Mobility  Supine to sit: Maximal Assist x 1   Sit to supine: Not Assessed ; at EOB     Supine to sit:  Independent   Sit to supine: Independent    Functional Transfers Not assessed; pt declined     Moderate Assist    Functional Mobility Not Assessed   Moderate Assist with use of wheeled walker      Balance Sitting:     Static: fair plus    Dynamic: fair   Standing: n/a      Sitting:     Static: good    Dynamic: good  Standing: fair plus    Activity Tolerance fair    Increase standing tolerance >3  minutes for improved engagement with functional transfers and indep in ADLs     Visual/  Perceptual Glasses: n/a   NA      Hand Dominance:      AROM (PROM) Strength Additional Info:  Goal:   RUE  WFL 2+/5 good  and wfl FMC/dexterity noted during ADL tasks   Improve overall RUE strength  for participation in functional tasks   LUE WFL 2+/5 good  and wfl FMC/dexterity noted during ADL tasks   Improve overall LUE strength  for participation in functional tasks     Hearing: [facility]   Sensation: No c/o numbness or tingling  Tone:  WFL   Edema: LE      Vitals:  HR at rest: 83 bpm HR with activity: 103-135 bpm HR at end of session:  bpm   SpO2 at rest: 94% SpO2 with activity: 88-89% SpO2 at end of session: %   BP at rest:  BP with activity:  BP at end of session:      Comments: RN cleared patient for OT. Upon arrival patient in supine. Therapist facilitated and instructed pt on adapted  techniques & compensatory strategies to improve safety and independence with basic ADLs, bed mobility, functional transfers and mobility to allow pt to achieve highest level of independence and safely. Pt demonstrated fair understanding of education & follow through. At end of session, patient was at [facility], nursing aware, with call light and phone within reach, all lines and tubes intact. Overall, patient demonstrated  decreased independence and safety during completion of ADL tasks. Pt would benefit from continued skilled OT to increase safety and independence with completion of ADL tasks and functional mobility for improved quality of life. Treatment: OT treatment provided this date includes:   Instructions/training on safety, sequencing, and adapted techniques for completion of ADLs. Facilitated bed mobility with cues for proper body mechanics and sequencing to prepare for ADL completion.   Sitting EOB >30 minutes to improve dynamic sitting balance and activity tolerance during ADLs  1x10-12 B UE there ex completed through available ranges to increase strength/endurance with ADLs     Rehab Potential: Good for established goals. Patient / Family Goal: not stated       Patient and/or family were instructed on functional diagnosis, prognosis/goals and OT plan of care. Demonstrated fair understanding. Eval Complexity: Low    Time In: 11:10 AM   Time Out: 11:53 AM    Total Treatment Time: 12    Non billable time working with PT       Min Units   OT Eval Low 97165  X  1    OT Eval Medium 88154      OT Eval High 53867      OT Re-Eval [id number]            ADL/Self Care 68338     Therapeutic Activities 90123       Therapeutic Ex 56753  12 1   Orthotic Management 54967       Manual 65066     Neuro Re-Ed 03755       Non-Billable Time        Evaluation Time additionally includes thorough review of current medical information, gathering information on past medical history/social history and prior level of function, interpretation of standardized testing/informal observation of tasks, assessment of data and development of plan of care and goals.         Evaluating OT: Jason Tamez OTR/L #[id number]

## 2022-12-14 LAB
ALBUMIN SERPL-MCNC: 2.6 G/DL (ref 3.5–5.2)
ALP BLD-CCNC: 79 U/L (ref 35–104)
ALT SERPL-CCNC: 44 U/L (ref 0–32)
ANION GAP SERPL CALCULATED.3IONS-SCNC: 9 MMOL/L (ref 7–16)
AST SERPL-CCNC: 37 U/L (ref 0–31)
BASOPHILS ABSOLUTE: 0 E9/L (ref 0–0.2)
BASOPHILS RELATIVE PERCENT: 0.4 % (ref 0–2)
BILIRUB SERPL-MCNC: 0.7 MG/DL (ref 0–1.2)
BUN BLDV-MCNC: 32 MG/DL (ref 6–23)
BURR CELLS: ABNORMAL
CALCIUM SERPL-MCNC: 9.1 MG/DL (ref 8.6–10.2)
CHLORIDE BLD-SCNC: 104 MMOL/L (ref 98–107)
CO2: 26 MMOL/L (ref 22–29)
CREAT SERPL-MCNC: 0.9 MG/DL (ref 0.5–1)
EOSINOPHILS ABSOLUTE: 0 E9/L (ref 0.05–0.5)
EOSINOPHILS RELATIVE PERCENT: 0.1 % (ref 0–6)
GFR SERPL CREATININE-BSD FRML MDRD: >60 ML/MIN/1.73
GLUCOSE BLD-MCNC: 97 MG/DL (ref 74–99)
HCT VFR BLD CALC: 41.2 % (ref 34–48)
HEMOGLOBIN: 13.7 G/DL (ref 11.5–15.5)
LYMPHOCYTES ABSOLUTE: 1.46 E9/L (ref 1.5–4)
LYMPHOCYTES RELATIVE PERCENT: 7.7 % (ref 20–42)
MCH RBC QN AUTO: 29.6 PG (ref 26–35)
MCHC RBC AUTO-ENTMCNC: 33.3 % (ref 32–34.5)
MCV RBC AUTO: 89 FL (ref 80–99.9)
METAMYELOCYTES RELATIVE PERCENT: 1.7 % (ref 0–1)
MONOCYTES ABSOLUTE: 0.55 E9/L (ref 0.1–0.95)
MONOCYTES RELATIVE PERCENT: 2.6 % (ref 2–12)
NEUTROPHILS ABSOLUTE: 16.47 E9/L (ref 1.8–7.3)
NEUTROPHILS RELATIVE PERCENT: 88 % (ref 43–80)
PDW BLD-RTO: 13.5 FL (ref 11.5–15)
PLATELET # BLD: 328 E9/L (ref 130–450)
PMV BLD AUTO: 10.2 FL (ref 7–12)
POIKILOCYTES: ABNORMAL
POTASSIUM SERPL-SCNC: 4.8 MMOL/L (ref 3.5–5)
RBC # BLD: 4.63 E12/L (ref 3.5–5.5)
SODIUM BLD-SCNC: 139 MMOL/L (ref 132–146)
TOTAL PROTEIN: 5.5 G/DL (ref 6.4–8.3)
WBC # BLD: 18.3 E9/L (ref 4.5–11.5)

## 2022-12-14 PROCEDURE — 2700000000 HC OXYGEN THERAPY PER DAY

## 2022-12-14 PROCEDURE — 94640 AIRWAY INHALATION TREATMENT: CPT

## 2022-12-14 PROCEDURE — 6370000000 HC RX 637 (ALT 250 FOR IP): Performed by: INTERNAL MEDICINE

## 2022-12-14 PROCEDURE — 2580000003 HC RX 258: Performed by: INTERNAL MEDICINE

## 2022-12-14 PROCEDURE — 36415 COLL VENOUS BLD VENIPUNCTURE: CPT

## 2022-12-14 PROCEDURE — 6360000002 HC RX W HCPCS: Performed by: INTERNAL MEDICINE

## 2022-12-14 PROCEDURE — 85025 COMPLETE CBC W/AUTO DIFF WBC: CPT

## 2022-12-14 PROCEDURE — 80053 COMPREHEN METABOLIC PANEL: CPT

## 2022-12-14 PROCEDURE — 97110 THERAPEUTIC EXERCISES: CPT

## 2022-12-14 PROCEDURE — 2500000003 HC RX 250 WO HCPCS: Performed by: INTERNAL MEDICINE

## 2022-12-14 PROCEDURE — 2060000000 HC ICU INTERMEDIATE R&B

## 2022-12-14 PROCEDURE — 97530 THERAPEUTIC ACTIVITIES: CPT

## 2022-12-14 RX ORDER — DOXYCYCLINE HYCLATE 100 MG/1
100 CAPSULE ORAL 2 TIMES DAILY
Qty: 12 CAPSULE | Refills: 0 | DISCHARGE
Start: 2022-12-14 | End: 2022-12-20

## 2022-12-14 RX ORDER — CEFDINIR 300 MG/1
300 CAPSULE ORAL 2 TIMES DAILY
Qty: 10 CAPSULE | Refills: 0 | DISCHARGE
Start: 2022-12-14 | End: 2022-12-19

## 2022-12-14 RX ORDER — DEXAMETHASONE 6 MG/1
6 TABLET ORAL
Qty: 4 TABLET | Refills: 0 | DISCHARGE
Start: 2022-12-14 | End: 2022-12-18

## 2022-12-14 RX ORDER — BENZONATATE 100 MG/1
100 CAPSULE ORAL 3 TIMES DAILY
Qty: 21 CAPSULE | Refills: 0 | DISCHARGE
Start: 2022-12-14 | End: 2022-12-21

## 2022-12-14 RX ADMIN — KETOTIFEN FUMARATE 1 DROP: 0.25 SOLUTION/ DROPS OPHTHALMIC at 20:05

## 2022-12-14 RX ADMIN — IPRATROPIUM BROMIDE AND ALBUTEROL 1 PUFF: 20; 100 SPRAY, METERED RESPIRATORY (INHALATION) at 11:04

## 2022-12-14 RX ADMIN — Medication 10 ML: at 20:15

## 2022-12-14 RX ADMIN — ISOSORBIDE MONONITRATE 30 MG: 30 TABLET, EXTENDED RELEASE ORAL at 10:27

## 2022-12-14 RX ADMIN — CEFTRIAXONE 2000 MG: 2 INJECTION, POWDER, FOR SOLUTION INTRAMUSCULAR; INTRAVENOUS at 15:11

## 2022-12-14 RX ADMIN — IPRATROPIUM BROMIDE AND ALBUTEROL 1 PUFF: 20; 100 SPRAY, METERED RESPIRATORY (INHALATION) at 06:51

## 2022-12-14 RX ADMIN — BRIMONIDINE TARTRATE 1 DROP: 2 SOLUTION OPHTHALMIC at 10:23

## 2022-12-14 RX ADMIN — ATENOLOL 25 MG: 50 TABLET ORAL at 10:28

## 2022-12-14 RX ADMIN — DULOXETINE HYDROCHLORIDE 30 MG: 30 CAPSULE, DELAYED RELEASE ORAL at 10:28

## 2022-12-14 RX ADMIN — PANTOPRAZOLE SODIUM 40 MG: 40 TABLET, DELAYED RELEASE ORAL at 05:02

## 2022-12-14 RX ADMIN — PYRIDOXINE HCL TAB 50 MG 50 MG: 50 TAB at 10:26

## 2022-12-14 RX ADMIN — BARICITINIB 4 MG: 2 TABLET, FILM COATED ORAL at 10:26

## 2022-12-14 RX ADMIN — BENZONATATE 100 MG: 100 CAPSULE ORAL at 20:15

## 2022-12-14 RX ADMIN — IPRATROPIUM BROMIDE AND ALBUTEROL 1 PUFF: 20; 100 SPRAY, METERED RESPIRATORY (INHALATION) at 17:46

## 2022-12-14 RX ADMIN — OXYCODONE HYDROCHLORIDE AND ACETAMINOPHEN 1000 MG: 500 TABLET ORAL at 10:27

## 2022-12-14 RX ADMIN — BENZONATATE 100 MG: 100 CAPSULE ORAL at 10:28

## 2022-12-14 RX ADMIN — LATANOPROST 1 DROP: 50 SOLUTION OPHTHALMIC at 20:09

## 2022-12-14 RX ADMIN — LEVOTHYROXINE SODIUM 88 MCG: 0.09 TABLET ORAL at 05:02

## 2022-12-14 RX ADMIN — DORZOLAMIDE HYDROCHLORIDE 1 DROP: 20 SOLUTION/ DROPS OPHTHALMIC at 10:50

## 2022-12-14 RX ADMIN — DOXYCYCLINE 100 MG: 100 INJECTION, POWDER, LYOPHILIZED, FOR SOLUTION INTRAVENOUS at 15:38

## 2022-12-14 RX ADMIN — APIXABAN 5 MG: 5 TABLET, FILM COATED ORAL at 20:15

## 2022-12-14 RX ADMIN — ANTI-FUNGAL POWDER MICONAZOLE NITRATE TALC FREE: 1.42 POWDER TOPICAL at 20:20

## 2022-12-14 RX ADMIN — DEXAMETHASONE SODIUM PHOSPHATE 6 MG: 10 INJECTION INTRAMUSCULAR; INTRAVENOUS at 10:47

## 2022-12-14 RX ADMIN — Medication 10 ML: at 10:22

## 2022-12-14 RX ADMIN — DORZOLAMIDE HYDROCHLORIDE 1 DROP: 20 SOLUTION/ DROPS OPHTHALMIC at 20:14

## 2022-12-14 RX ADMIN — KETOTIFEN FUMARATE 1 DROP: 0.25 SOLUTION/ DROPS OPHTHALMIC at 10:23

## 2022-12-14 RX ADMIN — BENZONATATE 100 MG: 100 CAPSULE ORAL at 15:08

## 2022-12-14 RX ADMIN — APIXABAN 5 MG: 5 TABLET, FILM COATED ORAL at 10:29

## 2022-12-14 RX ADMIN — ASPIRIN 81 MG: 81 TABLET, COATED ORAL at 10:26

## 2022-12-14 RX ADMIN — DOXYCYCLINE 100 MG: 100 INJECTION, POWDER, LYOPHILIZED, FOR SOLUTION INTRAVENOUS at 05:02

## 2022-12-14 RX ADMIN — ANTI-FUNGAL POWDER MICONAZOLE NITRATE TALC FREE: 1.42 POWDER TOPICAL at 10:21

## 2022-12-14 RX ADMIN — ATORVASTATIN CALCIUM 10 MG: 10 TABLET, FILM COATED ORAL at 10:29

## 2022-12-14 RX ADMIN — CLONIDINE HYDROCHLORIDE 0.05 MG: 0.1 TABLET ORAL at 10:26

## 2022-12-14 RX ADMIN — IPRATROPIUM BROMIDE AND ALBUTEROL 1 PUFF: 20; 100 SPRAY, METERED RESPIRATORY (INHALATION) at 22:02

## 2022-12-14 RX ADMIN — ZINC SULFATE 220 MG (50 MG) CAPSULE 50 MG: CAPSULE at 10:27

## 2022-12-14 RX ADMIN — CLONIDINE HYDROCHLORIDE 0.05 MG: 0.1 TABLET ORAL at 20:14

## 2022-12-14 RX ADMIN — BRIMONIDINE TARTRATE 1 DROP: 2 SOLUTION OPHTHALMIC at 20:00

## 2022-12-14 ASSESSMENT — PAIN SCALES - GENERAL
PAINLEVEL_OUTOF10: 0

## 2022-12-14 ASSESSMENT — PULMONARY FUNCTION TESTS: PEFR_L/MIN: 17

## 2022-12-14 NOTE — CARE COORDINATION
SOCIAL WORK / DISCHARGE PLANNING:   COVID positive. Pt has been accepted at [facility] at [facility], [address], Fax [phone], skilled. NO PRECERT. They can not accept on [employer] due to cost and inability to obtain. Await IV plan, if will need at dc. As well as if pt will need bipap at dc, so one can be obtained if does not have home bipap to provide. DELIA will need signed by physician. HENS form initiated, will need completed when dc order is provided. Sw has attempted to call pt x 3 with no answer to discuss dc plan. Addendum: 127pm.- Sw spoke with pt and she remains agreeable to DINA plan. She does have home bipap and is aware will need to take with her to [facility] Updated Lisa Gerber, rep. Addendum: 302pm  Pt to discharge today to [facility] at [facility], [address], Fax [phone],  skilled. NO PRECERT. Transport arrranged via [employer]. 10:10 Pm, Lambert BRYANT charge aware. DELIA will need signed by physician. HENS form completed.      Electronically signed by VICKY Walker on 12/14/2022 at 10:59 AM

## 2022-12-14 NOTE — PROGRESS NOTES
Physical Therapy Treatment Note/Plan of Care    Room #:  9574/7774-04  Patient Name: Cortney Dias  YOB: 1940  MRN: [id number]    Date of Service: 12/14/2022     Tentative placement recommendation: Subacute rehab  Equipment recommendation: Bedside commode      Evaluating Physical Therapist: Judy Juan Carlos, [address] #[id number]      Specific Provider Orders/Date/Referring Provider :  12/12/22 1345    PT eval and treat  Start:  12/12/22 1345,   End:  12/12/22 1345,   ONE TIME,   Standing Count:  1 Occurrences,   R         Lake Thrasher DO     Admitting Diagnosis:   Pneumonia due to organism [J18.9]  Acute UTI [N39.0]  Atrial fibrillation with RVR ([address] [I48.91]  Acute respiratory failure with hypoxia ([address] [J96.01]  Pneumonia due to infectious organism, unspecified laterality, unspecified part of lung [J18.9]     generalized fatigue  Surgery: none  Visit Diagnoses         Codes    Acute respiratory failure with hypoxia ([address]     J96.01    Acute UTI     N39.0    Atrial fibrillation with RVR ([address]     I48.91            Patient Active Problem List   Diagnosis    Acute renal failure (ARF) ([address]    Pyelonephritis    Obstructive uropathy    HTN (hypertension)    Hyperlipidemia    Obstructive sleep apnea    Glaucoma    DJD (degenerative joint disease)    Morbid obesity ([address]    Kidney stone    Pneumonia    Moderate protein-calorie malnutrition (HCC)    Atrial fibrillation with rapid ventricular response ([address]    Pneumonia due to organism        ASSESSMENT of Current Deficits Patient exhibits decreased strength, balance, and endurance impairing functional mobility, transfers, gait , gait distance, and tolerance to activity. Patient needing maximal assist for supine to sit. Pt fatigues quickly, O2 desaturation with minimal exertion, and impaired strength/endurance: all factors that limit increased functional activity. Pt needing maximal assist x 2 to attempt sit to stand transfers due to impaired strength with BLE's. Patient requires skilled physical therapy to address concerns listed above to increase safety and independence at discharge. PHYSICAL THERAPY  PLAN OF CARE       Physical therapy plan of care is established based on physician order,  patient diagnosis and clinical assessment    Current Treatment Recommendations:    -Bed Mobility: Lower extremity exercises  and Trunk control activities   -Sitting Balance: Incorporate reaching activities to activate trunk muscles   -Standing Balance: Perform strengthening exercises in standing to promote motor control with or without upper extremity support   -Transfers: Provide instruction on proper hand and foot position for adequate transfer of weight onto lower extremities and use of gait device if needed, Cues for hand placement, technique and safety. Provide stabilization to prevent fall , and Assist with extension of knees trunk and hip to accept weight transfer   -Gait: Gait training, Standing activities to improve: base of support, weight shift, weight bearing , and Pregait training to emphasize: Sequencing , Base of support, Weight shift, Upright, and Safety   -Endurance: Utilize Supervised activities to increase level of endurance to allow for safe functional mobility including transfers and gait     PT long term treatment goals are located in below grid    Patient and or family understand(s) diagnosis, prognosis, and plan of care. Frequency of treatments: Patient will be seen  daily.          Prior Level of Function: Patient ambulated with wheeled walker  reports she has not been able to walk for past 3 days   Rehab Potential: good   for baseline    Past medical history:   Past Medical History:   Diagnosis Date    Cataract     right eye    Cholecystitis with cholelithiasis     Diverticulitis     Diverticulosis     DJD (degenerative joint disease)     DVT (deep venous thrombosis) (HCC)     RLE    Fibroids     Glaucoma     HTN (hypertension) Hyperlipidemia     states on med for precaution    Morbid obesity ([facility] [address]     GLENNA on CPAP     Osteoarthritis     PE (pulmonary embolism)     Renal cyst     right     Past Surgical History:   Procedure Laterality Date    CATARACT REMOVAL WITH IMPLANT  12/2011    left eye    CHOLECYSTECTOMY, [address]  jun 2012    cystoscopy, right ureteroscopy with retrograde pyelograms, stone manipulation, stent insertion, laser lithotripsy with holmium laser    HYSTERECTOMY (CERVIX STATUS UNKNOWN)      [place] GLAUCOMA SURG,TRABECU AB EXTERNO Right 11/15/2018    RIGHT EYE REVISION OF XEN EYE STENT, TRABECULECTOMY performed by Alicia Khan MD at [address] [facility] [address] [facility] (CERVIX REMOVED)  [facility]    for RLE DVT       SUBJECTIVE:    Precautions: Up with assistance and Check Pulse Oximetry while ambulating , falls, O2, and Droplet plus/COVID-19 ,      Imaging results: XR CHEST (2 VW)    Result Date: 11/28/2022  EXAMINATION: TWO XRAY VIEWS OF THE CHEST 11/28/2022 12:08 pm    Suspect mild pulmonary vascular congestion. XR CHEST PORTABLE    Result Date: 12/9/2022  EXAMINATION: ONE XRAY VIEW OF THE CHEST 12/9/2022 4:10 pm    Right upper lobe opacity worrisome for pneumonia. CT CHEST HIGH RESOLUTION    Result Date: 11/29/2022  EXAMINATION: CT IMAGES OF THE CHEST WITHOUT CONTRAST, HIGH RESOLUTION 11/29/2022      No chronic or acute pulmonary process specifically, no discrete evidence for interstitial lung disease with minimal atelectasis or scarring at the left lung base may be from prior bout of airspace disease. No fibrotic change.        Social history: Patient lives with spouse in a duplex  with Ramp  to enter  Walk in shower       Equipment owned: Wheelchair, Imani Linares, [address] [facility] [address] chair, and Electric wheelchair,       [address]   How much difficulty turning over in bed?: A Lot  How much difficulty sitting down on / standing up from a chair with arms?: A Lot  How much difficulty moving from lying on back to sitting on side of bed?: A Lot  How much help from another person moving to and from a bed to a chair?: Total  How much help from another person needed to walk in hospital room?: Total  How much help from another person for climbing 3-5 steps with a railing?: Total  AM-PAC Inpatient Mobility Raw Score : 9  AM-[facility] Inpatient T-Scale Score : 30.55  Mobility Inpatient CMS 0-100% Score: 81.38  Mobility Inpatient CMS G-Code Modifier : CM    Nursing cleared patient for PT treatment. OBJECTIVE;   Initial Evaluation  Date: 12/12/2022 Treatment Date:  12/14/2022       Short Term/ Long Term   Goals   Was pt agreeable to Eval/treatment? Yes yes To be met in 5 days   Pain level   0/10    0/10    Bed Mobility    Rolling: Maximal assist of 1    Supine to sit: Maximal assist of 1    Sit to supine: Maximal assist of  2    Scooting: Maximal assist of  2   Rolling: Maximal assist of 1   Supine to sit: Maximal assist of 1   Sit to supine: Maximal assist of 1   Scooting: Maximal assist of 1    Rolling: Moderate assist of 1    Supine to sit: Moderate assist of 1    Sit to supine: Moderate assist of 1    Scooting: Moderate assist of 1     Transfers Sit to stand: Not assessed  due to pt overall debility, decreased activity tolerance, balance deficits, safety and fall risk. Sit to stand: Maximal assist of  2 barely got her off of the bed    Sit to stand:  Moderate assist of 1     Ambulation    not assessed  not assessed      20 feet using  wheeled walker with Moderate assist of 1    Stair negotiation: ascended and descended   Not assessed          ROM Within functional limits    Increase range of motion 10% of affected joints    Strength BUE:  refer to OT eval  RLE:  3 3+/5  LLE:  3 3+/5  Increase strength in affected mm groups by 1/3 grade   Balance Sitting EOB:  good -  Dynamic Standing:  not assessed   Sitting EOB: good   Dynamic Standing: not assessed    Sitting EOB:  good    Dynamic Standing: fair +wheeled walker      Patient is Alert & Oriented x person, place, time, and situation and follows directions    Sensation:  Patient  denies numbness/tingling   Edema:  yes bilateral lower extremities   Endurance: fair  +    Vitals:  3 liters high flow nasal cannula   Blood Pressure at rest  Blood Pressure during session    Heart Rate at rest  Heart Rate during session    SPO2 at rest 98%  SPO2 during session 85-92%      Patient education  Patient educated on role of Physical Therapy, risks of immobility, safety and plan of care,  importance of mobility while in hospital , ankle pumps, quad set and glut set for edema control, blood clot prevention, positioning for skin integrity and comfort, and proper use/technique of incentive spirometer     Patient response to education:   Pt verbalized understanding Pt demonstrated skill Pt requires further education in this area   Yes Partial Yes      Treatment:  Patient practiced and was instructed/facilitated in the following treatment: Patient performed supine exercises. Pt assisted to edge of bed,   Sat edge of bed 15 minutes with Supervision  to increase dynamic sitting balance and activity tolerance. Attempted to stand the first time, nursing assisted me, we were not successful with getting her off of the bed. Pt rested, on the second attempt, we barely got her off of the bed but she sat down immediately due to weakness. Therapeutic Exercises:  ankle pumps, quad sets, heel slide, hip abduction/adduction, and straight leg raise,  x 15 - 20 reps. AAROM/AROM    At end of session, patient sitting edge of bed with spouse present call light and phone within reach,  all lines and tubes intact, nursing notified. Patient would benefit from continued skilled Physical Therapy to improve functional independence and quality of life.          Patient's/ family goals home    Time in  10:25  Time out 10:50    Total Treatment Time  25 minutes        CPT codes:    Therapeutic activities (99402)   15 minutes  1 unit(s)  Therapeutic exercises (33681)   10 minutes  1 unit(s)     Emiliano Prieto  [facility]  LIC # [id number]

## 2022-12-14 NOTE — DISCHARGE SUMMARY
Department of Internal Medicine      PCP: Regis Anne DO  Admitting Physician: Dr. Manuel Wiley  Consultants:   Date of Service: 12/9/2022    CHIEF COMPLAINT:  weakness    HISTORY OF PRESENT ILLNESS:    Patient is an 28-year-old female who presented to the ED with generalized weakness and fatigue for the last few days. Patient denies any shortness of breath or cough. She denies any subjective fever or chills. She denies any dysuria. patient did obtain the COVID-19 vaccination    12/10/2022  Patient seen examined on monitored floor. Patient's  is at the bedside and case discussed. Patient thinks she feels a little bit better but still very very weak. Creatinine improving with currently BUN/creatinine 55/1.0 with a creatinine 1.3 on admission. Transaminases normal with WBC still elevated 22.5 with hemoglobin 15.7. CRP is improved to 7.2 with D-dimer 319. Temperature 97.7 with a heart rate 92 with blood pressure 140/113. O2 sat 96% on 6 L nasal cannula. Patient still tachycardic. We will put her back on her clonidine but switch to oral form. 12/11/2022  Patient seen examined on [facility]. Case discussed with patient's nurse today. Patient has no peripheral IV sites for lab work. Patient is also complaining of increasing cough nonproductive cough. Patient's  is at the bedside and case discussed. Temperature 98.2 with heart rate 84 and blood pressure 120/60. O2 sat 95% on 2 L nasal cannula. 12/12/2022  Patient seen examined on [facility]. Patient's  is at the bedside and case discussed. The patient denies any problem with any chest or abdominal pain this time. The patient does feel short of breath with activity. Patient denies any fever/chills or productive cough. BUN/creatinine was 39/0.9 normal electrolytes. Transaminases normal with WBC is 19.9 hemoglobin 13.4. Temperature is 97.9 with a heart rate of 94 blood pressure 121/70. O2 sat 94% on 4 L nasal cannula.   Urine output is fairly good.    12/13/2022  Patient seen examined on [address]. Patient has consented to go to nursing home. BUN/creatinine 34/0.9 normal electrolytes. Liver enzymes essentially normal WBC still elevated but slowly improving 17.4 with hemoglobin 13.5. Temperature is 98 with heart rate 84 blood pressure 119/78. O2 sat 95% on 2 L nasal cannula. Urine output is good. Transfer to extended-care facility when bed is available    12/14/2022  Patient seen examined on [address]. Patient seems to be doing little bit better today. Patient has consented to go to temporary extended-care facility for rehab. BUN/creatinine was 32/0.9 with patient mild elevation transaminase with WBC improved 18.3 with hemoglobin 13.7. Temperature was 97.3 with heart rate 85 blood pressure 132/85. O2 sat 93% on 3 L nasal cannula. Patient is set up for transfer to [facility] extended-care facility for further rehab. Patient should have repeat chest x-ray in about 3-4 weeks for follow-up. Patient stable for discharge.       PAST MEDICAL Hx:  Past Medical History:   Diagnosis Date    Cataract     right eye    Cholecystitis with cholelithiasis     Diverticulitis     Diverticulosis     DJD (degenerative joint disease)     DVT (deep venous thrombosis) (HCC)     RLE    Fibroids     Glaucoma     HTN (hypertension)     Hyperlipidemia     states on med for precaution    Morbid obesity ([address]     GLENNA on CPAP     Osteoarthritis     PE (pulmonary embolism)     Renal cyst     right       PAST SURGICAL Hx:   Past Surgical History:   Procedure Laterality Date    CATARACT REMOVAL WITH IMPLANT  12/2011    left eye    CHOLECYSTECTOMY, LAPAROSCOPIC  1995    COLONOSCOPY      CYSTOSCOPY  jun 2012    cystoscopy, right ureteroscopy with retrograde pyelograms, stone manipulation, stent insertion, laser lithotripsy with holmium laser    HYSTERECTOMY (CERVIX STATUS UNKNOWN)      [place] GLAUCOMA SURG,TRABECU AB EXTERNO Right 11/15/2018    RIGHT EYE REVISION OF XEN EYE STENT, TRABECULECTOMY performed by Lonny Arias MD at [facility] (CERVIX REMOVED)  [facility]    for RLE DVT       FAMILY Hx:  Family History   Problem Relation Age of Onset    Cancer Brother          - lung cancer    COPD Father     Coronary Art Dis Father     Heart Attack Mother 62        sudden death       HOME MEDICATIONS:  Prior to Admission medications    Medication Sig Start Date End Date Taking? Authorizing Provider   albuterol-ipratropium (COMBIVENT RESPIMAT)  MCG/ACT AERS inhaler Inhale 1 puff into the lungs in the morning and 1 puff at noon and 1 puff in the evening and 1 puff before bedtime. 22  Yes Flash Zayas DO   benzonatate (TESSALON) 100 MG capsule Take 1 capsule by mouth 3 times daily for 7 days 22 Yes Larry Reynolds, DO   miconazole (MICOTIN) 2 % powder Apply topically 2 times daily.  22  Yes Flash Zayas DO   cefdinir (OMNICEF) 300 MG capsule Take 1 capsule by mouth 2 times daily for 5 days 22 Yes Flash Zayas DO   doxycycline hyclate (VIBRAMYCIN) 100 MG capsule Take 1 capsule by mouth 2 times daily for 6 days 22 Yes Flash Zayas DO   atenolol (TENORMIN) 25 MG tablet Take 1 tablet by mouth daily 22   Flash Zayas DO   ascorbic acid (VITAMIN C) 1000 MG tablet Take 1 tablet by mouth daily 22   Flash Zayas DO   apixaban (ELIQUIS) 5 MG TABS tablet Take 1 tablet by mouth 2 times daily 22   Flash Zayas DO   potassium citrate (UROCIT-K) 10 MEQ (1080 MG) extended release tablet Take by mouth daily    Historical Provider, MD   DULoxetine (CYMBALTA) 30 MG extended release capsule Take 30 mg by mouth daily    Historical Provider, MD   isosorbide mononitrate (IMDUR) 30 MG extended release tablet Take 30 mg by mouth daily    Historical Provider, MD   zinc gluconate 50 MG tablet Take 50 mg by mouth daily    Historical Provider, MD   Boswellia-Glucosamine-Vit D (OSTEO BI-FLEX ONE PER DAY PO) Take 1 tablet by mouth daily    Historical Provider, MD   sodium chloride (CANDY 128) 5 % ophthalmic solution Place 1 drop into both eyes 4 times daily    Historical Provider, MD   latanoprost (XALATAN) 0.005 % ophthalmic solution Place 1 drop into the left eye nightly    Historical Provider, MD   dorzolamide (TRUSOPT) 2 % ophthalmic solution Place 1 drop into the left eye in the morning and at bedtime    Historical Provider, MD   brimonidine (ALPHAGAN) 0.2 % ophthalmic solution Place 1 drop into both eyes in the morning and at bedtime    Historical Provider, MD   olopatadine (PATADAY) 0.2 % SOLN ophthalmic solution Place 1 drop into both eyes daily    Historical Provider, MD   levothyroxine (SYNTHROID) 88 MCG tablet Take 1 tablet by mouth Daily 4/24/19   Harry Sheldon DO   pantoprazole (PROTONIX) 40 MG tablet Take 1 tablet by mouth every morning (before breakfast) 4/24/19   Harry Sheldon DO   oxybutynin (DITROPAN) 5 MG tablet Take 1 tablet by mouth 2 times daily 4/23/19   Harry Sheldon DO   hydrocortisone 2.5 % cream Apply topically 2 times daily. Patient taking differently: Apply 1 application topically 2 times daily Apply topically 2 times daily to where skin rash noted 4/23/19   Harry Sheldon DO   Psyllium (METAMUCIL FIBER PO) Take by mouth 2 times daily    Historical Provider, MD   aspirin 81 MG EC tablet Take 81 mg by mouth daily Pt instructed to check hold with dr. Kaden Sutherland Provider, MD   [facility] OIL Take 1,000 mg by mouth daily Ld 11/9/2018  Patient not taking: Reported on 11/28/2022    Historical Provider, MD   Docusate Sodium (DOCULASE PO) Take 100 mg by mouth in the morning and at bedtime    Historical Provider, MD   amlodipine (NORVASC) 2.5 MG tablet Take 1 tablet by mouth daily.  5/9/12   Harry Sheldon DO   clonidine (CATAPRES) 0.3 MG/24HR Place 1 patch onto the skin once a week Indications: saturday     Historical Provider, MD atorvastatin (LIPITOR) 10 MG tablet Take 10 mg by mouth daily. Historical Provider, MD   nitroGLYCERIN (NITROSTAT) 0.4 MG SL tablet Place 0.4 mg under the tongue every 5 minutes as needed. Historical Provider, MD       ALLERGIES:  Pcn [penicillins] and Noroxin [norfloxacin]    SOCIAL Hx:  Social History     Socioeconomic History    Marital status:      Spouse name: Not on file    Number of children: Not on file    Years of education: Not on file    Highest education level: Not on file   Occupational History    Occupation: parminder     Comment: retired    Tobacco Use    Smoking status: Former     Packs/day: 1.00     Years: 30.00     Pack years: 30.00     Types: Cigarettes     Quit date: 1991     Years since quittin.6    Smokeless tobacco: Never   Vaping Use    Vaping Use: Never used   Substance and Sexual Activity    Alcohol use: No    Drug use: No    Sexual activity: Not on file   Other Topics Concern    Not on file   Social History Narrative    Not on file     Social Determinants of Health     Financial Resource Strain: Not on file   Food Insecurity: Not on file   Transportation Needs: Not on file   Physical Activity: Not on file   Stress: Not on file   Social Connections: Not on file   Intimate Partner Violence: Not on file   Housing Stability: Not on file       ROS: Positive in bold  General:   Denies chills, fatigue, fever, malaise, night sweats or weight loss    Psychological:   Denies anxiety, disorientation or hallucinations    ENT:    Denies epistaxis, headaches, vertigo or visual changes    Cardiovascular:   Denies any chest pain, irregular heartbeats, or palpitations. No paroxysmal nocturnal dyspnea. Respiratory:   Denies shortness of breath, coughing, sputum production, hemoptysis, or wheezing. No orthopnea. Gastrointestinal:   Denies nausea, vomiting, diarrhea, or constipation. Denies any abdominal pain. Denies change in bowel habits or stools.       Genito-Urinary: Denies any urgency, frequency, hematuria. Voiding without difficulty. Musculoskeletal:   Denies joint pain, joint stiffness, joint swelling or muscle pain    Neurology:    Denies any headache or focal neurological deficits. No weakness or paresthesia. Derm:    Denies any rashes, ulcers, or excoriations. Denies bruising. Extremities:   Denies any lower extremity swelling or edema. PHYSICAL EXAM: Abnormal findings noted  VITALS:  Vitals:    12/14/22 0815   BP: 132/85   Pulse: 85   Resp: 20   Temp: 97.3 °F (36.3 °C)   SpO2: 93%         CONSTITUTIONAL:    Awake, alert, cooperative, no apparent distress, and appears stated age    EYES:    EOMI, sclera clear, conjunctiva normal    ENT:    Normocephalic, atraumatic, External ears without lesions. NECK:    Supple, symmetrical, trachea midline, no JVD    HEMATOLOGIC/LYMPHATICS:    No cervical lymphadenopathy and no supraclavicular lymphadenopathy    LUNGS:    Symmetric. No increased work of breathing, good air exchange, clear to auscultation bilaterally, no wheezes, rhonchi, or rales,   Patient has coarse breath sounds and rhonchi bilaterally    CARDIOVASCULAR:    Normal apical impulse, regular rate and rhythm, normal S1 and S2, no S3 or S4, and no murmur noted    ABDOMEN:    soft, non-distended, non-tender    MUSCULOSKELETAL:    There is no redness, warmth, or swelling of the joints. NEUROLOGIC:    Awake, alert, oriented to name, place and time. SKIN:    No bruising or bleeding. No redness, warmth, or swelling    EXTREMITIES:    Peripheral pulses present. No edema, cyanosis, or swelling.     LINES/CATHETERS     LABORATORY DATA:  CBC with Differential:    Lab Results   Component Value Date/Time    WBC 18.3 12/14/2022 08:56 AM    RBC 4.63 12/14/2022 08:56 AM    HGB 13.7 12/14/2022 08:56 AM    HCT 41.2 12/14/2022 08:56 AM     12/14/2022 08:56 AM    MCV 89.0 12/14/2022 08:56 AM    MCH 29.6 12/14/2022 08:56 AM    MCHC 33.3 12/14/2022 08:56 AM    RDW 13.5 12/14/2022 08:56 AM    SEGSPCT 55 05/24/2012 02:10 PM    METASPCT 1.7 12/14/2022 08:56 AM    LYMPHOPCT 7.7 12/14/2022 08:56 AM    MONOPCT 2.6 12/14/2022 08:56 AM    MYELOPCT 0.9 12/13/2022 05:33 AM    BASOPCT 0.4 12/14/2022 08:56 AM    MONOSABS 0.55 12/14/2022 08:56 AM    LYMPHSABS 1.46 12/14/2022 08:56 AM    EOSABS 0.00 12/14/2022 08:56 AM    BASOSABS 0.00 12/14/2022 08:56 AM     CMP:    Lab Results   Component Value Date/Time     12/14/2022 08:56 AM    K 4.8 12/14/2022 08:56 AM    K 4.0 12/09/2022 04:29 PM     12/14/2022 08:56 AM    CO2 26 12/14/2022 08:56 AM    BUN 32 12/14/2022 08:56 AM    CREATININE 0.9 12/14/2022 08:56 AM    GFRAA >60 04/23/2019 06:15 AM    LABGLOM >60 12/14/2022 08:56 AM    GLUCOSE 97 12/14/2022 08:56 AM    GLUCOSE 105 05/24/2012 02:10 PM    PROT 5.5 12/14/2022 08:56 AM    LABALBU 2.6 12/14/2022 08:56 AM    LABALBU 3.9 05/24/2012 02:10 PM    CALCIUM 9.1 12/14/2022 08:56 AM    BILITOT 0.7 12/14/2022 08:56 AM    ALKPHOS 79 12/14/2022 08:56 AM    AST 37 12/14/2022 08:56 AM    ALT 44 12/14/2022 08:56 AM       ASSESSMENT/PLAN:  Acute hypoxic respiratory failure  COVID-19 with sepsis  Pneumonia-community-acquired  Mild aortic stenosis  Pulmonary hypertension  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

## 2022-12-15 VITALS
BODY MASS INDEX: 48.82 KG/M2 | DIASTOLIC BLOOD PRESSURE: 77 MMHG | WEIGHT: 293 LBS | SYSTOLIC BLOOD PRESSURE: 152 MMHG | HEIGHT: 65 IN | RESPIRATION RATE: 25 BRPM | OXYGEN SATURATION: 91 % | TEMPERATURE: 98.2 F | HEART RATE: 68 BPM

## 2022-12-15 LAB
ALBUMIN SERPL-MCNC: 2.6 G/DL (ref 3.5–5.2)
ALP BLD-CCNC: 77 U/L (ref 35–104)
ALT SERPL-CCNC: 47 U/L (ref 0–32)
ANION GAP SERPL CALCULATED.3IONS-SCNC: 8 MMOL/L (ref 7–16)
AST SERPL-CCNC: 32 U/L (ref 0–31)
BASOPHILS ABSOLUTE: 0 E9/L (ref 0–0.2)
BASOPHILS RELATIVE PERCENT: 0.3 % (ref 0–2)
BILIRUB SERPL-MCNC: 0.7 MG/DL (ref 0–1.2)
BUN BLDV-MCNC: 30 MG/DL (ref 6–23)
BURR CELLS: ABNORMAL
CALCIUM SERPL-MCNC: 9.1 MG/DL (ref 8.6–10.2)
CHLORIDE BLD-SCNC: 104 MMOL/L (ref 98–107)
CO2: 27 MMOL/L (ref 22–29)
CREAT SERPL-MCNC: 0.9 MG/DL (ref 0.5–1)
CULTURE, BLOOD 2: NORMAL
EOSINOPHILS ABSOLUTE: 0 E9/L (ref 0.05–0.5)
EOSINOPHILS RELATIVE PERCENT: 0.1 % (ref 0–6)
GFR SERPL CREATININE-BSD FRML MDRD: >60 ML/MIN/1.73
GLUCOSE BLD-MCNC: 93 MG/DL (ref 74–99)
HCT VFR BLD CALC: 41.6 % (ref 34–48)
HEMOGLOBIN: 13.7 G/DL (ref 11.5–15.5)
LYMPHOCYTES ABSOLUTE: 0.67 E9/L (ref 1.5–4)
LYMPHOCYTES RELATIVE PERCENT: 3.5 % (ref 20–42)
MCH RBC QN AUTO: 29.3 PG (ref 26–35)
MCHC RBC AUTO-ENTMCNC: 32.9 % (ref 32–34.5)
MCV RBC AUTO: 88.9 FL (ref 80–99.9)
MONOCYTES ABSOLUTE: 1.34 E9/L (ref 0.1–0.95)
MONOCYTES RELATIVE PERCENT: 7.8 % (ref 2–12)
MYELOCYTE PERCENT: 0.9 % (ref 0–0)
NEUTROPHILS ABSOLUTE: 14.95 E9/L (ref 1.8–7.3)
NEUTROPHILS RELATIVE PERCENT: 87.8 % (ref 43–80)
OVALOCYTES: ABNORMAL
PDW BLD-RTO: 13.5 FL (ref 11.5–15)
PLATELET # BLD: 326 E9/L (ref 130–450)
PMV BLD AUTO: 10.1 FL (ref 7–12)
POIKILOCYTES: ABNORMAL
POTASSIUM SERPL-SCNC: 4.6 MMOL/L (ref 3.5–5)
RBC # BLD: 4.68 E12/L (ref 3.5–5.5)
SODIUM BLD-SCNC: 139 MMOL/L (ref 132–146)
TOTAL PROTEIN: 5.3 G/DL (ref 6.4–8.3)
WBC # BLD: 16.8 E9/L (ref 4.5–11.5)

## 2022-12-15 PROCEDURE — 2580000003 HC RX 258: Performed by: INTERNAL MEDICINE

## 2022-12-15 PROCEDURE — 80053 COMPREHEN METABOLIC PANEL: CPT

## 2022-12-15 PROCEDURE — 85025 COMPLETE CBC W/AUTO DIFF WBC: CPT

## 2022-12-15 PROCEDURE — 94640 AIRWAY INHALATION TREATMENT: CPT

## 2022-12-15 PROCEDURE — 2500000003 HC RX 250 WO HCPCS: Performed by: INTERNAL MEDICINE

## 2022-12-15 PROCEDURE — 36415 COLL VENOUS BLD VENIPUNCTURE: CPT

## 2022-12-15 PROCEDURE — 6370000000 HC RX 637 (ALT 250 FOR IP): Performed by: INTERNAL MEDICINE

## 2022-12-15 RX ADMIN — LEVOTHYROXINE SODIUM 88 MCG: 0.09 TABLET ORAL at 05:34

## 2022-12-15 RX ADMIN — PANTOPRAZOLE SODIUM 40 MG: 40 TABLET, DELAYED RELEASE ORAL at 05:34

## 2022-12-15 RX ADMIN — DOXYCYCLINE 100 MG: 100 INJECTION, POWDER, LYOPHILIZED, FOR SOLUTION INTRAVENOUS at 04:21

## 2022-12-15 RX ADMIN — IPRATROPIUM BROMIDE AND ALBUTEROL 1 PUFF: 20; 100 SPRAY, METERED RESPIRATORY (INHALATION) at 06:32

## 2022-12-15 RX ADMIN — SODIUM CHLORIDE, PRESERVATIVE FREE 10 ML: 5 INJECTION INTRAVENOUS at 04:20

## 2022-12-15 ASSESSMENT — PAIN SCALES - GENERAL
PAINLEVEL_OUTOF10: 0
PAINLEVEL_OUTOF10: 0

## 2022-12-15 NOTE — PROGRESS NOTES
[facility] was called to obtain ETA for patient. [facility] representative, Caty Pfeiffer, stated that the  had erroneously placed the transportation ticket for tomorrow at [facility] transportation ETA will be 0730 am. Facility notified.  Orders faxed to facility per request.

## 2022-12-15 NOTE — PROGRESS NOTES
Rapid response called at this time for patient desaturation down to 79% on 10L HF. Patient still complaining of SOB and still very lethargic. RR team at bedside.

## 2022-12-16 LAB
COMMENT: NORMAL
REPORT: NORMAL

## 2022-12-21 ENCOUNTER — OUTSIDE SERVICES (OUTPATIENT)
Dept: PRIMARY CARE CLINIC | Age: 82
End: 2022-12-21

## 2022-12-21 DIAGNOSIS — I48.91 ATRIAL FIBRILLATION WITH RAPID VENTRICULAR RESPONSE (HCC): ICD-10-CM

## 2022-12-21 DIAGNOSIS — I10 PRIMARY HYPERTENSION: ICD-10-CM

## 2022-12-21 DIAGNOSIS — N20.0 KIDNEY STONE: ICD-10-CM

## 2022-12-21 DIAGNOSIS — N13.9 OBSTRUCTIVE UROPATHY: ICD-10-CM

## 2022-12-21 DIAGNOSIS — E66.01 MORBID OBESITY (HCC): Primary | ICD-10-CM

## 2023-01-01 ENCOUNTER — APPOINTMENT (OUTPATIENT)
Dept: GENERAL RADIOLOGY | Age: 83
DRG: 871 | End: 2023-01-01
Payer: MEDICARE

## 2023-01-01 ENCOUNTER — APPOINTMENT (OUTPATIENT)
Dept: CT IMAGING | Age: 83
DRG: 871 | End: 2023-01-01
Payer: MEDICARE

## 2023-01-01 ENCOUNTER — APPOINTMENT (OUTPATIENT)
Dept: INTERVENTIONAL RADIOLOGY/VASCULAR | Age: 83
DRG: 871 | End: 2023-01-01
Payer: MEDICARE

## 2023-01-01 ENCOUNTER — HOSPITAL ENCOUNTER (INPATIENT)
Age: 83
LOS: 6 days | DRG: 871 | End: 2023-03-21
Attending: EMERGENCY MEDICINE | Admitting: INTERNAL MEDICINE
Payer: MEDICARE

## 2023-01-01 ENCOUNTER — APPOINTMENT (OUTPATIENT)
Dept: ULTRASOUND IMAGING | Age: 83
DRG: 871 | End: 2023-01-01
Payer: MEDICARE

## 2023-01-01 VITALS
WEIGHT: 293 LBS | SYSTOLIC BLOOD PRESSURE: 90 MMHG | OXYGEN SATURATION: 86 % | HEART RATE: 126 BPM | TEMPERATURE: 98.1 F | HEIGHT: 65 IN | DIASTOLIC BLOOD PRESSURE: 72 MMHG | BODY MASS INDEX: 48.82 KG/M2 | RESPIRATION RATE: 26 BRPM

## 2023-01-01 DIAGNOSIS — E83.42 HYPOMAGNESEMIA: ICD-10-CM

## 2023-01-01 DIAGNOSIS — J18.9 PNEUMONIA DUE TO INFECTIOUS ORGANISM, UNSPECIFIED LATERALITY, UNSPECIFIED PART OF LUNG: Primary | ICD-10-CM

## 2023-01-01 DIAGNOSIS — N28.9 RENAL INSUFFICIENCY: ICD-10-CM

## 2023-01-01 DIAGNOSIS — E87.6 HYPOKALEMIA: ICD-10-CM

## 2023-01-01 DIAGNOSIS — E86.0 DEHYDRATION: ICD-10-CM

## 2023-01-01 DIAGNOSIS — R53.83 OTHER FATIGUE: ICD-10-CM

## 2023-01-01 LAB
AADO2: 189.4 MMHG
AFP-TM SERPL-MCNC: 6 NG/ML (ref 0–9)
ALBUMIN SERPL-MCNC: 2.4 G/DL (ref 3.5–5.2)
ALBUMIN SERPL-MCNC: 2.5 G/DL (ref 3.5–5.2)
ALBUMIN SERPL-MCNC: 2.6 G/DL (ref 3.5–5.2)
ALBUMIN SERPL-MCNC: 2.7 G/DL (ref 3.5–5.2)
ALBUMIN SERPL-MCNC: 2.8 G/DL (ref 3.5–5.2)
ALBUMIN SERPL-MCNC: 2.9 G/DL (ref 3.5–5.2)
ALP SERPL-CCNC: 100 U/L (ref 35–104)
ALP SERPL-CCNC: 103 U/L (ref 35–104)
ALP SERPL-CCNC: 125 U/L (ref 35–104)
ALP SERPL-CCNC: 139 U/L (ref 35–104)
ALP SERPL-CCNC: 83 U/L (ref 35–104)
ALP SERPL-CCNC: 92 U/L (ref 35–104)
ALT SERPL-CCNC: 204 U/L (ref 0–32)
ALT SERPL-CCNC: 6 U/L (ref 0–32)
ALT SERPL-CCNC: 6 U/L (ref 0–32)
ALT SERPL-CCNC: 7 U/L (ref 0–32)
ALT SERPL-CCNC: 8 U/L (ref 0–32)
ALT SERPL-CCNC: 8 U/L (ref 0–32)
ANION GAP SERPL CALCULATED.3IONS-SCNC: 13 MMOL/L (ref 7–16)
ANION GAP SERPL CALCULATED.3IONS-SCNC: 13 MMOL/L (ref 7–16)
ANION GAP SERPL CALCULATED.3IONS-SCNC: 14 MMOL/L (ref 7–16)
ANION GAP SERPL CALCULATED.3IONS-SCNC: 16 MMOL/L (ref 7–16)
ANION GAP SERPL CALCULATED.3IONS-SCNC: 16 MMOL/L (ref 7–16)
ANION GAP SERPL CALCULATED.3IONS-SCNC: 35 MMOL/L (ref 7–16)
ANISOCYTOSIS: ABNORMAL
AST SERPL-CCNC: 15 U/L (ref 0–31)
AST SERPL-CCNC: 16 U/L (ref 0–31)
AST SERPL-CCNC: 19 U/L (ref 0–31)
AST SERPL-CCNC: 20 U/L (ref 0–31)
AST SERPL-CCNC: 20 U/L (ref 0–31)
AST SERPL-CCNC: 837 U/L (ref 0–31)
B PARAP IS1001 DNA NPH QL NAA+NON-PROBE: NOT DETECTED
B PERT.PT PRMT NPH QL NAA+NON-PROBE: NOT DETECTED
B.E.: -18 MMOL/L (ref -3–3)
B.E.: -19.1 MMOL/L (ref -3–3)
BACTERIA BLD CULT: ABNORMAL
BACTERIA UR CULT: NORMAL
BACTERIA URNS QL MICRO: ABNORMAL /HPF
BASOPHILIC STIPPLING: ABNORMAL
BASOPHILS # BLD: 0 E9/L (ref 0–0.2)
BASOPHILS NFR BLD: 0 % (ref 0–2)
BASOPHILS NFR BLD: 0.4 % (ref 0–2)
BASOPHILS NFR BLD: 0.6 % (ref 0–2)
BETA-HYDROXYBUTYRATE: 0.74 MMOL/L (ref 0.02–0.27)
BILIRUB SERPL-MCNC: 0.6 MG/DL (ref 0–1.2)
BILIRUB SERPL-MCNC: 0.7 MG/DL (ref 0–1.2)
BILIRUB SERPL-MCNC: 0.7 MG/DL (ref 0–1.2)
BILIRUB SERPL-MCNC: 0.8 MG/DL (ref 0–1.2)
BILIRUB SERPL-MCNC: 1.1 MG/DL (ref 0–1.2)
BILIRUB SERPL-MCNC: 1.3 MG/DL (ref 0–1.2)
BILIRUB UR QL STRIP: ABNORMAL
BUN SERPL-MCNC: 27 MG/DL (ref 6–23)
BUN SERPL-MCNC: 27 MG/DL (ref 6–23)
BUN SERPL-MCNC: 28 MG/DL (ref 6–23)
BUN SERPL-MCNC: 29 MG/DL (ref 6–23)
BUN SERPL-MCNC: 29 MG/DL (ref 6–23)
BUN SERPL-MCNC: 33 MG/DL (ref 6–23)
BURR CELLS: ABNORMAL
C DIFF TOXIN/ANTIGEN: NORMAL
C PNEUM DNA NPH QL NAA+NON-PROBE: NOT DETECTED
CALCIUM SERPL-MCNC: 8.1 MG/DL (ref 8.6–10.2)
CALCIUM SERPL-MCNC: 8.3 MG/DL (ref 8.6–10.2)
CALCIUM SERPL-MCNC: 8.5 MG/DL (ref 8.6–10.2)
CALCIUM SERPL-MCNC: 8.8 MG/DL (ref 8.6–10.2)
CHLORIDE SERPL-SCNC: 106 MMOL/L (ref 98–107)
CHLORIDE SERPL-SCNC: 108 MMOL/L (ref 98–107)
CHLORIDE SERPL-SCNC: 108 MMOL/L (ref 98–107)
CHLORIDE SERPL-SCNC: 110 MMOL/L (ref 98–107)
CHLORIDE SERPL-SCNC: 110 MMOL/L (ref 98–107)
CHLORIDE SERPL-SCNC: 112 MMOL/L (ref 98–107)
CK SERPL-CCNC: 32 U/L (ref 20–180)
CLARITY UR: ABNORMAL
CO2 SERPL-SCNC: 19 MMOL/L (ref 22–29)
CO2 SERPL-SCNC: 20 MMOL/L (ref 22–29)
CO2 SERPL-SCNC: 22 MMOL/L (ref 22–29)
CO2 SERPL-SCNC: 24 MMOL/L (ref 22–29)
CO2 SERPL-SCNC: 28 MMOL/L (ref 22–29)
CO2 SERPL-SCNC: 9 MMOL/L (ref 22–29)
COHB: 0.2 % (ref 0–1.5)
COHB: 0.2 % (ref 0–1.5)
COLOR UR: ABNORMAL
COMMENT: ABNORMAL
CREAT SERPL-MCNC: 1 MG/DL (ref 0.5–1)
CREAT SERPL-MCNC: 1.1 MG/DL (ref 0.5–1)
CREAT SERPL-MCNC: 1.1 MG/DL (ref 0.5–1)
CREAT SERPL-MCNC: 1.2 MG/DL (ref 0.5–1)
CREAT SERPL-MCNC: 1.2 MG/DL (ref 0.5–1)
CREAT SERPL-MCNC: 1.4 MG/DL (ref 0.5–1)
CRITICAL: ABNORMAL
CRITICAL: ABNORMAL
DATE ANALYZED: ABNORMAL
DATE ANALYZED: ABNORMAL
DATE OF COLLECTION: ABNORMAL
DATE OF COLLECTION: ABNORMAL
EKG ATRIAL RATE: 144 BPM
EKG ATRIAL RATE: 78 BPM
EKG Q-T INTERVAL: 330 MS
EKG Q-T INTERVAL: 360 MS
EKG QRS DURATION: 84 MS
EKG QRS DURATION: 88 MS
EKG QTC CALCULATION (BAZETT): 473 MS
EKG QTC CALCULATION (BAZETT): 492 MS
EKG R AXIS: 32 DEGREES
EKG R AXIS: 35 DEGREES
EKG T AXIS: -116 DEGREES
EKG T AXIS: -140 DEGREES
EKG VENTRICULAR RATE: 104 BPM
EKG VENTRICULAR RATE: 134 BPM
EOSINOPHIL # BLD: 0 E9/L (ref 0.05–0.5)
EOSINOPHIL # BLD: 0.22 E9/L (ref 0.05–0.5)
EOSINOPHIL NFR BLD: 0 % (ref 0–6)
EOSINOPHIL NFR BLD: 1 % (ref 0–6)
EPI CELLS #/AREA URNS HPF: ABNORMAL /HPF
ERYTHROCYTE [DISTWIDTH] IN BLOOD BY AUTOMATED COUNT: 17.8 FL (ref 11.5–15)
ERYTHROCYTE [DISTWIDTH] IN BLOOD BY AUTOMATED COUNT: 17.9 FL (ref 11.5–15)
ERYTHROCYTE [DISTWIDTH] IN BLOOD BY AUTOMATED COUNT: 18.1 FL (ref 11.5–15)
ERYTHROCYTE [DISTWIDTH] IN BLOOD BY AUTOMATED COUNT: 18.1 FL (ref 11.5–15)
ERYTHROCYTE [DISTWIDTH] IN BLOOD BY AUTOMATED COUNT: 18.6 FL (ref 11.5–15)
ERYTHROCYTE [DISTWIDTH] IN BLOOD BY AUTOMATED COUNT: 19 FL (ref 11.5–15)
FERRITIN SERPL-MCNC: 1178 NG/ML
FERRITIN SERPL-MCNC: 538 NG/ML
FIO2: 100 %
FLUAV RNA NPH QL NAA+NON-PROBE: NOT DETECTED
FLUBV RNA NPH QL NAA+NON-PROBE: NOT DETECTED
FOLATE SERPL-MCNC: 3.7 NG/ML (ref 4.8–24.2)
G LAMBLIA AG STL QL IA: NORMAL
GLUCOSE SERPL-MCNC: 112 MG/DL (ref 74–99)
GLUCOSE SERPL-MCNC: 113 MG/DL (ref 74–99)
GLUCOSE SERPL-MCNC: 117 MG/DL (ref 74–99)
GLUCOSE SERPL-MCNC: 118 MG/DL (ref 74–99)
GLUCOSE SERPL-MCNC: 91 MG/DL (ref 74–99)
GLUCOSE SERPL-MCNC: 98 MG/DL (ref 74–99)
GLUCOSE UR STRIP-MCNC: NEGATIVE MG/DL
HADV DNA NPH QL NAA+NON-PROBE: NOT DETECTED
HCO3: 10.2 MMOL/L (ref 22–26)
HCO3: 7.3 MMOL/L (ref 22–26)
HCOV 229E RNA NPH QL NAA+NON-PROBE: NOT DETECTED
HCOV HKU1 RNA NPH QL NAA+NON-PROBE: NOT DETECTED
HCOV NL63 RNA NPH QL NAA+NON-PROBE: NOT DETECTED
HCOV OC43 RNA NPH QL NAA+NON-PROBE: NOT DETECTED
HCT VFR BLD AUTO: 31.6 % (ref 34–48)
HCT VFR BLD AUTO: 32.3 % (ref 34–48)
HCT VFR BLD AUTO: 34.2 % (ref 34–48)
HCT VFR BLD AUTO: 34.8 % (ref 34–48)
HCT VFR BLD AUTO: 38.4 % (ref 34–48)
HCT VFR BLD AUTO: 38.7 % (ref 34–48)
HCT VFR BLD AUTO: 39.2 % (ref 34–48)
HEMOCCULT STL QL: NORMAL
HGB BLD-MCNC: 10.7 G/DL (ref 11.5–15.5)
HGB BLD-MCNC: 10.9 G/DL (ref 11.5–15.5)
HGB BLD-MCNC: 11 G/DL (ref 11.5–15.5)
HGB BLD-MCNC: 11.6 G/DL (ref 11.5–15.5)
HGB BLD-MCNC: 12.5 G/DL (ref 11.5–15.5)
HGB BLD-MCNC: 8.9 G/DL (ref 11.5–15.5)
HGB UR QL STRIP: ABNORMAL
HHB: 1.1 % (ref 0–5)
HHB: 1.3 % (ref 0–5)
HMPV RNA NPH QL NAA+NON-PROBE: NOT DETECTED
HPIV1 RNA NPH QL NAA+NON-PROBE: NOT DETECTED
HPIV2 RNA NPH QL NAA+NON-PROBE: NOT DETECTED
HPIV3 RNA NPH QL NAA+NON-PROBE: NOT DETECTED
HPIV4 RNA NPH QL NAA+NON-PROBE: NOT DETECTED
HYPOCHROMIA: ABNORMAL
IMMATURE RETIC FRACT: 30.2 % (ref 3–15.9)
IRON SATN MFR SERPL: 60 % (ref 15–50)
IRON SATN MFR SERPL: 92 % (ref 15–50)
IRON SERPL-MCNC: 141 MCG/DL (ref 37–145)
IRON SERPL-MCNC: 92 MCG/DL (ref 37–145)
KETONES UR STRIP-MCNC: ABNORMAL MG/DL
LAB: ABNORMAL
LAB: ABNORMAL
LACTATE BLDV-SCNC: 2.7 MMOL/L (ref 0.5–2.2)
LACTATE BLDV-SCNC: 21.9 MMOL/L (ref 0.5–2.2)
LACTATE BLDV-SCNC: 3.3 MMOL/L (ref 0.5–1.9)
LEGIONELLA AG UR QL: NORMAL
LEUKOCYTE ESTERASE UR QL STRIP: ABNORMAL
LIPASE: 14 U/L (ref 13–60)
LYMPHOCYTES # BLD: 1.28 E9/L (ref 1.5–4)
LYMPHOCYTES # BLD: 1.9 E9/L (ref 1.5–4)
LYMPHOCYTES # BLD: 2.1 E9/L (ref 1.5–4)
LYMPHOCYTES # BLD: 2.24 E9/L (ref 1.5–4)
LYMPHOCYTES # BLD: 2.85 E9/L (ref 1.5–4)
LYMPHOCYTES # BLD: 2.88 E9/L (ref 1.5–4)
LYMPHOCYTES NFR BLD: 13 % (ref 20–42)
LYMPHOCYTES NFR BLD: 14 % (ref 20–42)
LYMPHOCYTES NFR BLD: 5.1 % (ref 20–42)
LYMPHOCYTES NFR BLD: 7.6 % (ref 20–42)
LYMPHOCYTES NFR BLD: 8 % (ref 20–42)
LYMPHOCYTES NFR BLD: 9 % (ref 20–42)
Lab: ABNORMAL
Lab: ABNORMAL
M PNEUMO DNA NPH QL NAA+NON-PROBE: NOT DETECTED
MAGNESIUM SERPL-MCNC: 1.5 MG/DL (ref 1.6–2.6)
MAGNESIUM SERPL-MCNC: 2.4 MG/DL (ref 1.6–2.6)
MAGNESIUM SERPL-MCNC: 2.6 MG/DL (ref 1.6–2.6)
MCH RBC QN AUTO: 29.3 PG (ref 26–35)
MCH RBC QN AUTO: 29.3 PG (ref 26–35)
MCH RBC QN AUTO: 29.5 PG (ref 26–35)
MCH RBC QN AUTO: 29.8 PG (ref 26–35)
MCH RBC QN AUTO: 29.8 PG (ref 26–35)
MCH RBC QN AUTO: 30.1 PG (ref 26–35)
MCHC RBC AUTO-ENTMCNC: 28.2 % (ref 32–34.5)
MCHC RBC AUTO-ENTMCNC: 28.2 % (ref 32–34.5)
MCHC RBC AUTO-ENTMCNC: 30.2 % (ref 32–34.5)
MCHC RBC AUTO-ENTMCNC: 31.9 % (ref 32–34.5)
MCHC RBC AUTO-ENTMCNC: 32.2 % (ref 32–34.5)
MCHC RBC AUTO-ENTMCNC: 33.1 % (ref 32–34.5)
MCV RBC AUTO: 103.9 FL (ref 80–99.9)
MCV RBC AUTO: 104.6 FL (ref 80–99.9)
MCV RBC AUTO: 90.7 FL (ref 80–99.9)
MCV RBC AUTO: 92.7 FL (ref 80–99.9)
MCV RBC AUTO: 93.6 FL (ref 80–99.9)
MCV RBC AUTO: 97 FL (ref 80–99.9)
METAMYELOCYTES NFR BLD MANUAL: 1 % (ref 0–1)
METAMYELOCYTES NFR BLD MANUAL: 2 % (ref 0–1)
METER GLUCOSE: 104 MG/DL (ref 74–99)
METHB: 0.6 % (ref 0–1.5)
METHB: 0.8 % (ref 0–1.5)
MODE: ABNORMAL
MODE: ABNORMAL
MONOCYTES # BLD: 0 E9/L (ref 0.1–0.95)
MONOCYTES # BLD: 0.82 E9/L (ref 0.1–0.95)
MONOCYTES # BLD: 1.05 E9/L (ref 0.1–0.95)
MONOCYTES # BLD: 1.42 E9/L (ref 0.1–0.95)
MONOCYTES # BLD: 1.75 E9/L (ref 0.1–0.95)
MONOCYTES # BLD: 2.3 E9/L (ref 0.1–0.95)
MONOCYTES NFR BLD: 0 % (ref 2–12)
MONOCYTES NFR BLD: 4 % (ref 2–12)
MONOCYTES NFR BLD: 4.2 % (ref 2–12)
MONOCYTES NFR BLD: 6 % (ref 2–12)
MONOCYTES NFR BLD: 8 % (ref 2–12)
MONOCYTES NFR BLD: 9.4 % (ref 2–12)
MYELOCYTES NFR BLD MANUAL: 1 % (ref 0–0)
MYELOCYTES NFR BLD MANUAL: 1.7 % (ref 0–0)
MYELOCYTES NFR BLD MANUAL: 2 % (ref 0–0)
MYELOCYTES NFR BLD MANUAL: 2 % (ref 0–0)
NEUTROPHILS # BLD: 16.89 E9/L (ref 1.8–7.3)
NEUTROPHILS # BLD: 17.08 E9/L (ref 1.8–7.3)
NEUTROPHILS # BLD: 20.38 E9/L (ref 1.8–7.3)
NEUTROPHILS # BLD: 22.02 E9/L (ref 1.8–7.3)
NEUTROPHILS # BLD: 22.66 E9/L (ref 1.8–7.3)
NEUTROPHILS # BLD: 22.88 E9/L (ref 1.8–7.3)
NEUTS SEG NFR BLD: 74 % (ref 43–80)
NEUTS SEG NFR BLD: 80 % (ref 43–80)
NEUTS SEG NFR BLD: 84 % (ref 43–80)
NEUTS SEG NFR BLD: 85.5 % (ref 43–80)
NEUTS SEG NFR BLD: 85.7 % (ref 43–80)
NEUTS SEG NFR BLD: 91 % (ref 43–80)
NITRITE UR QL STRIP: POSITIVE
NRBC BLD-RTO: 1 /100 WBC
NRBC BLD-RTO: 3 /100 WBC
NRBC BLD-RTO: 5.9 /100 WBC
O2 CONTENT: 14.3 ML/DL
O2 CONTENT: 15 ML/DL
O2 SATURATION: 98.7 % (ref 92–98.5)
O2 SATURATION: 98.9 % (ref 92–98.5)
O2HB: 97.9 % (ref 94–97)
O2HB: 97.9 % (ref 94–97)
OPERATOR ID: 274
OPERATOR ID: 8219
ORGANISM: ABNORMAL
ORGANISM: ABNORMAL
OVALOCYTES: ABNORMAL
PATHOLOGIST REVIEW: NORMAL
PATIENT TEMP: 37 C
PATIENT TEMP: 37 C
PCO2: 19.8 MMHG (ref 35–45)
PCO2: 33.1 MMHG (ref 35–45)
PEEP/CPAP: 5 CMH2O
PFO2: 4.79 MMHG/%
PH BLOOD GAS: 7.11 (ref 7.35–7.45)
PH BLOOD GAS: 7.18 (ref 7.35–7.45)
PH UR STRIP: 5.5 [PH] (ref 5–9)
PH VENOUS: 7.34 (ref 7.35–7.45)
PHOSPHATE SERPL-MCNC: 2.9 MG/DL (ref 2.5–4.5)
PHOSPHATE SERPL-MCNC: 6.8 MG/DL (ref 2.5–4.5)
PLATELET # BLD AUTO: 229 E9/L (ref 130–450)
PLATELET # BLD AUTO: 239 E9/L (ref 130–450)
PLATELET # BLD AUTO: 245 E9/L (ref 130–450)
PLATELET # BLD AUTO: 252 E9/L (ref 130–450)
PLATELET # BLD AUTO: 259 E9/L (ref 130–450)
PLATELET # BLD AUTO: 296 E9/L (ref 130–450)
PMV BLD AUTO: 10.3 FL (ref 7–12)
PMV BLD AUTO: 10.8 FL (ref 7–12)
PMV BLD AUTO: 9.5 FL (ref 7–12)
PMV BLD AUTO: 9.5 FL (ref 7–12)
PMV BLD AUTO: 9.8 FL (ref 7–12)
PMV BLD AUTO: 9.9 FL (ref 7–12)
PO2: 278.4 MMHG (ref 75–100)
PO2: 478.8 MMHG (ref 75–100)
POIKILOCYTES: ABNORMAL
POLYCHROMASIA: ABNORMAL
POTASSIUM SERPL-SCNC: 3.1 MMOL/L (ref 3.5–5)
POTASSIUM SERPL-SCNC: 3.3 MMOL/L (ref 3.5–5)
POTASSIUM SERPL-SCNC: 4 MMOL/L (ref 3.5–5)
POTASSIUM SERPL-SCNC: 4.1 MMOL/L (ref 3.5–5)
POTASSIUM SERPL-SCNC: 4.2 MMOL/L (ref 3.5–5)
POTASSIUM SERPL-SCNC: 4.4 MMOL/L (ref 3.5–5)
PROCALCITONIN: 0.12 NG/ML (ref 0–0.08)
PROCALCITONIN: 0.12 NG/ML (ref 0–0.08)
PROCALCITONIN: 0.42 NG/ML (ref 0–0.08)
PROMYELOCYTES NFR BLD MANUAL: 0.8 % (ref 0–0)
PROT SERPL-MCNC: 4.4 G/DL (ref 6.4–8.3)
PROT SERPL-MCNC: 4.7 G/DL (ref 6.4–8.3)
PROT SERPL-MCNC: 4.8 G/DL (ref 6.4–8.3)
PROT SERPL-MCNC: 4.9 G/DL (ref 6.4–8.3)
PROT SERPL-MCNC: 5 G/DL (ref 6.4–8.3)
PROT SERPL-MCNC: 5.6 G/DL (ref 6.4–8.3)
PROT UR STRIP-MCNC: ABNORMAL MG/DL
RBC # BLD AUTO: 3.04 E12/L (ref 3.5–5.5)
RBC # BLD AUTO: 3.56 E12/L (ref 3.5–5.5)
RBC # BLD AUTO: 3.69 E12/L (ref 3.5–5.5)
RBC # BLD AUTO: 3.7 E12/L (ref 3.5–5.5)
RBC # BLD AUTO: 3.96 E12/L (ref 3.5–5.5)
RBC # BLD AUTO: 4.19 E12/L (ref 3.5–5.5)
RBC #/AREA URNS HPF: ABNORMAL /HPF (ref 0–2)
RETIC HGB EQUIVALENT: 35.6 PG (ref 28.2–36.6)
RETICS/RBC NFR: 3.3 % (ref 0.4–1.9)
RETICULOCYTE ABSOLUTE COUNT: 0.12 E12/L
RI(T): 0.4
ROTAVIRUS ANTIGEN: NORMAL
RR MECHANICAL: 30 B/MIN
RSV RNA NPH QL NAA+NON-PROBE: NOT DETECTED
RV+EV RNA NPH QL NAA+NON-PROBE: NOT DETECTED
S PNEUM AG SPEC QL: NORMAL
SARS-COV-2 RDRP RESP QL NAA+PROBE: NOT DETECTED
SARS-COV-2 RNA NPH QL NAA+NON-PROBE: NOT DETECTED
SODIUM SERPL-SCNC: 143 MMOL/L (ref 132–146)
SODIUM SERPL-SCNC: 144 MMOL/L (ref 132–146)
SODIUM SERPL-SCNC: 147 MMOL/L (ref 132–146)
SODIUM SERPL-SCNC: 149 MMOL/L (ref 132–146)
SODIUM SERPL-SCNC: 150 MMOL/L (ref 132–146)
SODIUM SERPL-SCNC: 150 MMOL/L (ref 132–146)
SOURCE, BLOOD GAS: ABNORMAL
SOURCE, BLOOD GAS: ABNORMAL
SP GR UR STRIP: 1.01 (ref 1–1.03)
T4 SERPL-MCNC: 6.8 MCG/DL (ref 4.5–11.7)
THB: 9.9 G/DL (ref 11.5–16.5)
THB: 9.9 G/DL (ref 11.5–16.5)
TIBC SERPL-MCNC: 153 MCG/DL (ref 250–450)
TIBC SERPL-MCNC: 154 MCG/DL (ref 250–450)
TIME ANALYZED: 709
TIME ANALYZED: 952
TROPONIN, HIGH SENSITIVITY: 61 NG/L (ref 0–9)
TROPONIN, HIGH SENSITIVITY: 62 NG/L (ref 0–9)
TROPONIN, HIGH SENSITIVITY: 94 NG/L (ref 0–9)
TSH SERPL-MCNC: 0.86 UIU/ML (ref 0.27–4.2)
UROBILINOGEN UR STRIP-ACNC: 0.2 E.U./DL
VIT B12 SERPL-MCNC: 969 PG/ML (ref 211–946)
VT MECHANICAL: 500 ML
WBC # BLD: 20.6 E9/L (ref 4.5–11.5)
WBC # BLD: 21.9 E9/L (ref 4.5–11.5)
WBC # BLD: 23.7 E9/L (ref 4.5–11.5)
WBC # BLD: 24.9 E9/L (ref 4.5–11.5)
WBC # BLD: 25.6 E9/L (ref 4.5–11.5)
WBC # BLD: 26.3 E9/L (ref 4.5–11.5)
WBC #/AREA STL HPF: NORMAL /[HPF]
WBC #/AREA URNS HPF: >20 /HPF (ref 0–5)

## 2023-01-01 PROCEDURE — 2500000003 HC RX 250 WO HCPCS: Performed by: INTERNAL MEDICINE

## 2023-01-01 PROCEDURE — 36415 COLL VENOUS BLD VENIPUNCTURE: CPT

## 2023-01-01 PROCEDURE — 2580000003 HC RX 258: Performed by: INTERNAL MEDICINE

## 2023-01-01 PROCEDURE — 6370000000 HC RX 637 (ALT 250 FOR IP): Performed by: INTERNAL MEDICINE

## 2023-01-01 PROCEDURE — 82800 BLOOD PH: CPT

## 2023-01-01 PROCEDURE — 85025 COMPLETE CBC W/AUTO DIFF WBC: CPT

## 2023-01-01 PROCEDURE — 97165 OT EVAL LOW COMPLEX 30 MIN: CPT

## 2023-01-01 PROCEDURE — 6360000002 HC RX W HCPCS: Performed by: INTERNAL MEDICINE

## 2023-01-01 PROCEDURE — 99291 CRITICAL CARE FIRST HOUR: CPT | Performed by: INTERNAL MEDICINE

## 2023-01-01 PROCEDURE — 97530 THERAPEUTIC ACTIVITIES: CPT

## 2023-01-01 PROCEDURE — 87040 BLOOD CULTURE FOR BACTERIA: CPT

## 2023-01-01 PROCEDURE — 96365 THER/PROPH/DIAG IV INF INIT: CPT

## 2023-01-01 PROCEDURE — 84443 ASSAY THYROID STIM HORMONE: CPT

## 2023-01-01 PROCEDURE — 96368 THER/DIAG CONCURRENT INF: CPT

## 2023-01-01 PROCEDURE — 2580000003 HC RX 258: Performed by: HOSPITALIST

## 2023-01-01 PROCEDURE — 94660 CPAP INITIATION&MGMT: CPT

## 2023-01-01 PROCEDURE — 6370000000 HC RX 637 (ALT 250 FOR IP): Performed by: HOSPITALIST

## 2023-01-01 PROCEDURE — 87425 ROTAVIRUS AG IA: CPT

## 2023-01-01 PROCEDURE — 6360000002 HC RX W HCPCS: Performed by: SPECIALIST

## 2023-01-01 PROCEDURE — 82105 ALPHA-FETOPROTEIN SERUM: CPT

## 2023-01-01 PROCEDURE — 2709999900 HC NON-CHARGEABLE SUPPLY

## 2023-01-01 PROCEDURE — 2580000003 HC RX 258: Performed by: FAMILY MEDICINE

## 2023-01-01 PROCEDURE — 5A1935Z RESPIRATORY VENTILATION, LESS THAN 24 CONSECUTIVE HOURS: ICD-10-PCS | Performed by: INTERNAL MEDICINE

## 2023-01-01 PROCEDURE — 87045 FECES CULTURE AEROBIC BACT: CPT

## 2023-01-01 PROCEDURE — 6360000002 HC RX W HCPCS: Performed by: HOSPITALIST

## 2023-01-01 PROCEDURE — 84145 PROCALCITONIN (PCT): CPT

## 2023-01-01 PROCEDURE — 93010 ELECTROCARDIOGRAM REPORT: CPT | Performed by: INTERNAL MEDICINE

## 2023-01-01 PROCEDURE — 89055 LEUKOCYTE ASSESSMENT FECAL: CPT

## 2023-01-01 PROCEDURE — 87449 NOS EACH ORGANISM AG IA: CPT

## 2023-01-01 PROCEDURE — 97110 THERAPEUTIC EXERCISES: CPT

## 2023-01-01 PROCEDURE — 76937 US GUIDE VASCULAR ACCESS: CPT

## 2023-01-01 PROCEDURE — 96375 TX/PRO/DX INJ NEW DRUG ADDON: CPT

## 2023-01-01 PROCEDURE — 94640 AIRWAY INHALATION TREATMENT: CPT

## 2023-01-01 PROCEDURE — 71045 X-RAY EXAM CHEST 1 VIEW: CPT

## 2023-01-01 PROCEDURE — 82746 ASSAY OF FOLIC ACID SERUM: CPT

## 2023-01-01 PROCEDURE — 6360000004 HC RX CONTRAST MEDICATION: Performed by: RADIOLOGY

## 2023-01-01 PROCEDURE — 82728 ASSAY OF FERRITIN: CPT

## 2023-01-01 PROCEDURE — 82607 VITAMIN B-12: CPT

## 2023-01-01 PROCEDURE — 2500000003 HC RX 250 WO HCPCS: Performed by: EMERGENCY MEDICINE

## 2023-01-01 PROCEDURE — 80053 COMPREHEN METABOLIC PANEL: CPT

## 2023-01-01 PROCEDURE — 99291 CRITICAL CARE FIRST HOUR: CPT | Performed by: FAMILY MEDICINE

## 2023-01-01 PROCEDURE — 82550 ASSAY OF CK (CPK): CPT

## 2023-01-01 PROCEDURE — XW043N5 INTRODUCTION OF MEROPENEM-VABORBACTAM ANTI-INFECTIVE INTO CENTRAL VEIN, PERCUTANEOUS APPROACH, NEW TECHNOLOGY GROUP 5: ICD-10-PCS | Performed by: SPECIALIST

## 2023-01-01 PROCEDURE — 83550 IRON BINDING TEST: CPT

## 2023-01-01 PROCEDURE — 74177 CT ABD & PELVIS W/CONTRAST: CPT

## 2023-01-01 PROCEDURE — 0202U NFCT DS 22 TRGT SARS-COV-2: CPT

## 2023-01-01 PROCEDURE — 94002 VENT MGMT INPAT INIT DAY: CPT

## 2023-01-01 PROCEDURE — 03HY32Z INSERTION OF MONITORING DEVICE INTO UPPER ARTERY, PERCUTANEOUS APPROACH: ICD-10-PCS | Performed by: INTERNAL MEDICINE

## 2023-01-01 PROCEDURE — 87324 CLOSTRIDIUM AG IA: CPT

## 2023-01-01 PROCEDURE — 84100 ASSAY OF PHOSPHORUS: CPT

## 2023-01-01 PROCEDURE — 83735 ASSAY OF MAGNESIUM: CPT

## 2023-01-01 PROCEDURE — 6370000000 HC RX 637 (ALT 250 FOR IP): Performed by: EMERGENCY MEDICINE

## 2023-01-01 PROCEDURE — 83690 ASSAY OF LIPASE: CPT

## 2023-01-01 PROCEDURE — 82805 BLOOD GASES W/O2 SATURATION: CPT

## 2023-01-01 PROCEDURE — P9047 ALBUMIN (HUMAN), 25%, 50ML: HCPCS | Performed by: INTERNAL MEDICINE

## 2023-01-01 PROCEDURE — 6360000002 HC RX W HCPCS: Performed by: EMERGENCY MEDICINE

## 2023-01-01 PROCEDURE — 87088 URINE BACTERIA CULTURE: CPT

## 2023-01-01 PROCEDURE — 84484 ASSAY OF TROPONIN QUANT: CPT

## 2023-01-01 PROCEDURE — 82010 KETONE BODYS QUAN: CPT

## 2023-01-01 PROCEDURE — 05HY33Z INSERTION OF INFUSION DEVICE INTO UPPER VEIN, PERCUTANEOUS APPROACH: ICD-10-PCS | Performed by: INTERNAL MEDICINE

## 2023-01-01 PROCEDURE — 83540 ASSAY OF IRON: CPT

## 2023-01-01 PROCEDURE — 87329 GIARDIA AG IA: CPT

## 2023-01-01 PROCEDURE — 85045 AUTOMATED RETICULOCYTE COUNT: CPT

## 2023-01-01 PROCEDURE — 1200000000 HC SEMI PRIVATE

## 2023-01-01 PROCEDURE — 93005 ELECTROCARDIOGRAM TRACING: CPT | Performed by: EMERGENCY MEDICINE

## 2023-01-01 PROCEDURE — 97161 PT EVAL LOW COMPLEX 20 MIN: CPT

## 2023-01-01 PROCEDURE — 2580000003 HC RX 258: Performed by: SPECIALIST

## 2023-01-01 PROCEDURE — 82272 OCCULT BLD FECES 1-3 TESTS: CPT

## 2023-01-01 PROCEDURE — APPSS60 APP SPLIT SHARED TIME 46-60 MINUTES: Performed by: PHYSICIAN ASSISTANT

## 2023-01-01 PROCEDURE — 02HV33Z INSERTION OF INFUSION DEVICE INTO SUPERIOR VENA CAVA, PERCUTANEOUS APPROACH: ICD-10-PCS | Performed by: INTERNAL MEDICINE

## 2023-01-01 PROCEDURE — 97110 THERAPEUTIC EXERCISES: CPT | Performed by: PHYSICAL THERAPIST

## 2023-01-01 PROCEDURE — 74018 RADEX ABDOMEN 1 VIEW: CPT

## 2023-01-01 PROCEDURE — 81001 URINALYSIS AUTO W/SCOPE: CPT

## 2023-01-01 PROCEDURE — 2500000003 HC RX 250 WO HCPCS

## 2023-01-01 PROCEDURE — 2500000003 HC RX 250 WO HCPCS: Performed by: FAMILY MEDICINE

## 2023-01-01 PROCEDURE — 2580000003 HC RX 258: Performed by: EMERGENCY MEDICINE

## 2023-01-01 PROCEDURE — 93005 ELECTROCARDIOGRAM TRACING: CPT | Performed by: INTERNAL MEDICINE

## 2023-01-01 PROCEDURE — 31500 INSERT EMERGENCY AIRWAY: CPT

## 2023-01-01 PROCEDURE — 83605 ASSAY OF LACTIC ACID: CPT

## 2023-01-01 PROCEDURE — 82962 GLUCOSE BLOOD TEST: CPT

## 2023-01-01 PROCEDURE — 6370000000 HC RX 637 (ALT 250 FOR IP): Performed by: SPECIALIST

## 2023-01-01 PROCEDURE — 99254 IP/OBS CNSLTJ NEW/EST MOD 60: CPT | Performed by: INTERNAL MEDICINE

## 2023-01-01 PROCEDURE — B548ZZA ULTRASONOGRAPHY OF SUPERIOR VENA CAVA, GUIDANCE: ICD-10-PCS | Performed by: INTERNAL MEDICINE

## 2023-01-01 PROCEDURE — 2060000000 HC ICU INTERMEDIATE R&B

## 2023-01-01 PROCEDURE — 99285 EMERGENCY DEPT VISIT HI MDM: CPT

## 2023-01-01 PROCEDURE — 92610 EVALUATE SWALLOWING FUNCTION: CPT

## 2023-01-01 PROCEDURE — 87635 SARS-COV-2 COVID-19 AMP PRB: CPT

## 2023-01-01 PROCEDURE — 99253 IP/OBS CNSLTJ NEW/EST LOW 45: CPT | Performed by: SURGERY

## 2023-01-01 PROCEDURE — 0BH17EZ INSERTION OF ENDOTRACHEAL AIRWAY INTO TRACHEA, VIA NATURAL OR ARTIFICIAL OPENING: ICD-10-PCS | Performed by: INTERNAL MEDICINE

## 2023-01-01 PROCEDURE — 80074 ACUTE HEPATITIS PANEL: CPT

## 2023-01-01 PROCEDURE — 76705 ECHO EXAM OF ABDOMEN: CPT

## 2023-01-01 PROCEDURE — 84436 ASSAY OF TOTAL THYROXINE: CPT

## 2023-01-01 PROCEDURE — 36556 INSERT NON-TUNNEL CV CATH: CPT

## 2023-01-01 PROCEDURE — 71250 CT THORAX DX C-: CPT

## 2023-01-01 PROCEDURE — 2580000003 HC RX 258

## 2023-01-01 RX ORDER — DEXTROSE MONOHYDRATE 100 MG/ML
INJECTION, SOLUTION INTRAVENOUS CONTINUOUS PRN
Status: DISCONTINUED | OUTPATIENT
Start: 2023-01-01 | End: 2023-01-01 | Stop reason: HOSPADM

## 2023-01-01 RX ORDER — PROCHLORPERAZINE EDISYLATE 5 MG/ML
5 INJECTION INTRAMUSCULAR; INTRAVENOUS EVERY 8 HOURS PRN
Status: DISCONTINUED | OUTPATIENT
Start: 2023-01-01 | End: 2023-01-01 | Stop reason: HOSPADM

## 2023-01-01 RX ORDER — ALBUMIN (HUMAN) 12.5 G/50ML
50 SOLUTION INTRAVENOUS ONCE
Status: COMPLETED | OUTPATIENT
Start: 2023-01-01 | End: 2023-01-01

## 2023-01-01 RX ORDER — ALBUTEROL SULFATE 2.5 MG/3ML
2.5 SOLUTION RESPIRATORY (INHALATION) EVERY 6 HOURS
Status: DISCONTINUED | OUTPATIENT
Start: 2023-01-01 | End: 2023-01-01

## 2023-01-01 RX ORDER — DORZOLAMIDE HCL 20 MG/ML
1 SOLUTION/ DROPS OPHTHALMIC 2 TIMES DAILY
Status: DISCONTINUED | OUTPATIENT
Start: 2023-01-01 | End: 2023-01-01 | Stop reason: HOSPADM

## 2023-01-01 RX ORDER — ACETAMINOPHEN 650 MG/1
650 SUPPOSITORY RECTAL EVERY 6 HOURS PRN
Status: DISCONTINUED | OUTPATIENT
Start: 2023-01-01 | End: 2023-01-01 | Stop reason: HOSPADM

## 2023-01-01 RX ORDER — CLONIDINE 0.3 MG/24H
1 PATCH, EXTENDED RELEASE TRANSDERMAL WEEKLY
Status: DISCONTINUED | OUTPATIENT
Start: 2023-01-01 | End: 2023-01-01

## 2023-01-01 RX ORDER — SODIUM CHLORIDE 9 MG/ML
INJECTION, SOLUTION INTRAVENOUS PRN
Status: DISCONTINUED | OUTPATIENT
Start: 2023-01-01 | End: 2023-01-01 | Stop reason: HOSPADM

## 2023-01-01 RX ORDER — ATENOLOL 50 MG/1
50 TABLET ORAL DAILY
Status: DISCONTINUED | OUTPATIENT
Start: 2023-01-01 | End: 2023-01-01

## 2023-01-01 RX ORDER — SODIUM CHLORIDE 0.9 % (FLUSH) 0.9 %
5-40 SYRINGE (ML) INJECTION EVERY 12 HOURS SCHEDULED
Status: DISCONTINUED | OUTPATIENT
Start: 2023-01-01 | End: 2023-01-01 | Stop reason: HOSPADM

## 2023-01-01 RX ORDER — HEPARIN SODIUM (PORCINE) LOCK FLUSH IV SOLN 100 UNIT/ML 100 UNIT/ML
1 SOLUTION INTRAVENOUS PRN
Status: DISCONTINUED | OUTPATIENT
Start: 2023-01-01 | End: 2023-01-01 | Stop reason: HOSPADM

## 2023-01-01 RX ORDER — SILICONE ADHESIVE 1.5" X 3"
1 SHEET (EA) TOPICAL 4 TIMES DAILY
Status: DISCONTINUED | OUTPATIENT
Start: 2023-01-01 | End: 2023-01-01 | Stop reason: HOSPADM

## 2023-01-01 RX ORDER — POLYETHYLENE GLYCOL 3350 17 G/17G
17 POWDER, FOR SOLUTION ORAL DAILY PRN
Status: DISCONTINUED | OUTPATIENT
Start: 2023-01-01 | End: 2023-01-01 | Stop reason: HOSPADM

## 2023-01-01 RX ORDER — MORPHINE SULFATE 2 MG/ML
1 INJECTION, SOLUTION INTRAMUSCULAR; INTRAVENOUS
Status: DISCONTINUED | OUTPATIENT
Start: 2023-01-01 | End: 2023-01-01 | Stop reason: HOSPADM

## 2023-01-01 RX ORDER — MIDAZOLAM HYDROCHLORIDE 1 MG/ML
INJECTION INTRAMUSCULAR; INTRAVENOUS
Status: DISCONTINUED
Start: 2023-01-01 | End: 2023-01-01 | Stop reason: HOSPADM

## 2023-01-01 RX ORDER — OXYBUTYNIN CHLORIDE 5 MG/1
5 TABLET ORAL 2 TIMES DAILY
Status: DISCONTINUED | OUTPATIENT
Start: 2023-01-01 | End: 2023-01-01 | Stop reason: HOSPADM

## 2023-01-01 RX ORDER — METRONIDAZOLE 500 MG/100ML
500 INJECTION, SOLUTION INTRAVENOUS EVERY 8 HOURS
Status: DISCONTINUED | OUTPATIENT
Start: 2023-01-01 | End: 2023-01-01

## 2023-01-01 RX ORDER — DIGOXIN 0.25 MG/ML
125 INJECTION INTRAMUSCULAR; INTRAVENOUS ONCE
Status: COMPLETED | OUTPATIENT
Start: 2023-01-01 | End: 2023-01-01

## 2023-01-01 RX ORDER — FENTANYL CITRATE 0.05 MG/ML
25 INJECTION, SOLUTION INTRAMUSCULAR; INTRAVENOUS EVERY 4 HOURS PRN
Status: DISCONTINUED | OUTPATIENT
Start: 2023-01-01 | End: 2023-01-01

## 2023-01-01 RX ORDER — HYDROMORPHONE HYDROCHLORIDE 1 MG/ML
0.5 INJECTION, SOLUTION INTRAMUSCULAR; INTRAVENOUS; SUBCUTANEOUS ONCE
Status: COMPLETED | OUTPATIENT
Start: 2023-01-01 | End: 2023-01-01

## 2023-01-01 RX ORDER — SODIUM CHLORIDE 9 MG/ML
INJECTION, SOLUTION INTRAVENOUS PRN
Status: DISCONTINUED | OUTPATIENT
Start: 2023-01-01 | End: 2023-01-01

## 2023-01-01 RX ORDER — MAGNESIUM SULFATE IN WATER 40 MG/ML
2000 INJECTION, SOLUTION INTRAVENOUS ONCE
Status: COMPLETED | OUTPATIENT
Start: 2023-01-01 | End: 2023-01-01

## 2023-01-01 RX ORDER — LATANOPROST 50 UG/ML
1 SOLUTION/ DROPS OPHTHALMIC NIGHTLY
Status: DISCONTINUED | OUTPATIENT
Start: 2023-01-01 | End: 2023-01-01 | Stop reason: HOSPADM

## 2023-01-01 RX ORDER — ATENOLOL 25 MG/1
25 TABLET ORAL DAILY
Status: DISCONTINUED | OUTPATIENT
Start: 2023-01-01 | End: 2023-01-01

## 2023-01-01 RX ORDER — MIDAZOLAM HYDROCHLORIDE 1 MG/ML
2 INJECTION INTRAMUSCULAR; INTRAVENOUS ONCE
Status: COMPLETED | OUTPATIENT
Start: 2023-01-01 | End: 2023-01-01

## 2023-01-01 RX ORDER — HEPARIN SODIUM (PORCINE) LOCK FLUSH IV SOLN 100 UNIT/ML 100 UNIT/ML
1 SOLUTION INTRAVENOUS EVERY 12 HOURS SCHEDULED
Status: DISCONTINUED | OUTPATIENT
Start: 2023-01-01 | End: 2023-01-01

## 2023-01-01 RX ORDER — KETOROLAC TROMETHAMINE 15 MG/ML
15 INJECTION, SOLUTION INTRAMUSCULAR; INTRAVENOUS EVERY 6 HOURS PRN
Status: DISCONTINUED | OUTPATIENT
Start: 2023-01-01 | End: 2023-01-01

## 2023-01-01 RX ORDER — ACETAMINOPHEN 325 MG/1
650 TABLET ORAL EVERY 6 HOURS PRN
Status: DISCONTINUED | OUTPATIENT
Start: 2023-01-01 | End: 2023-01-01 | Stop reason: HOSPADM

## 2023-01-01 RX ORDER — POTASSIUM CHLORIDE 20 MEQ/1
20 TABLET, EXTENDED RELEASE ORAL
Status: DISCONTINUED | OUTPATIENT
Start: 2023-01-01 | End: 2023-01-01

## 2023-01-01 RX ORDER — SODIUM CHLORIDE 0.9 % (FLUSH) 0.9 %
5-40 SYRINGE (ML) INJECTION PRN
Status: DISCONTINUED | OUTPATIENT
Start: 2023-01-01 | End: 2023-01-01

## 2023-01-01 RX ORDER — DULOXETIN HYDROCHLORIDE 30 MG/1
30 CAPSULE, DELAYED RELEASE ORAL DAILY
Status: DISCONTINUED | OUTPATIENT
Start: 2023-01-01 | End: 2023-01-01

## 2023-01-01 RX ORDER — PANTOPRAZOLE SODIUM 40 MG/1
40 TABLET, DELAYED RELEASE ORAL
Status: DISCONTINUED | OUTPATIENT
Start: 2023-01-01 | End: 2023-01-01

## 2023-01-01 RX ORDER — DEXMEDETOMIDINE HYDROCHLORIDE 4 UG/ML
.1-1.5 INJECTION, SOLUTION INTRAVENOUS CONTINUOUS
Status: DISCONTINUED | OUTPATIENT
Start: 2023-01-01 | End: 2023-01-01 | Stop reason: HOSPADM

## 2023-01-01 RX ORDER — SODIUM CHLORIDE 0.9 % (FLUSH) 0.9 %
5-40 SYRINGE (ML) INJECTION PRN
Status: DISCONTINUED | OUTPATIENT
Start: 2023-01-01 | End: 2023-01-01 | Stop reason: HOSPADM

## 2023-01-01 RX ORDER — ISOSORBIDE MONONITRATE 30 MG/1
30 TABLET, EXTENDED RELEASE ORAL DAILY
Status: DISCONTINUED | OUTPATIENT
Start: 2023-01-01 | End: 2023-01-01 | Stop reason: HOSPADM

## 2023-01-01 RX ORDER — METOPROLOL TARTRATE 5 MG/5ML
2.5 INJECTION INTRAVENOUS ONCE
Status: COMPLETED | OUTPATIENT
Start: 2023-01-01 | End: 2023-01-01

## 2023-01-01 RX ORDER — FENTANYL CITRATE 0.05 MG/ML
25 INJECTION, SOLUTION INTRAMUSCULAR; INTRAVENOUS ONCE
Status: COMPLETED | OUTPATIENT
Start: 2023-01-01 | End: 2023-01-01

## 2023-01-01 RX ORDER — BRIMONIDINE TARTRATE 2 MG/ML
1 SOLUTION/ DROPS OPHTHALMIC 2 TIMES DAILY
Status: DISCONTINUED | OUTPATIENT
Start: 2023-01-01 | End: 2023-01-01 | Stop reason: HOSPADM

## 2023-01-01 RX ORDER — HEPARIN SODIUM 5000 [USP'U]/ML
5000 INJECTION, SOLUTION INTRAVENOUS; SUBCUTANEOUS EVERY 8 HOURS SCHEDULED
Status: DISCONTINUED | OUTPATIENT
Start: 2023-01-01 | End: 2023-01-01 | Stop reason: HOSPADM

## 2023-01-01 RX ORDER — SODIUM CHLORIDE 450 MG/100ML
INJECTION, SOLUTION INTRAVENOUS CONTINUOUS
Status: DISCONTINUED | OUTPATIENT
Start: 2023-01-01 | End: 2023-01-01

## 2023-01-01 RX ORDER — MIDAZOLAM HYDROCHLORIDE 1 MG/ML
1 INJECTION INTRAMUSCULAR; INTRAVENOUS
Status: DISCONTINUED | OUTPATIENT
Start: 2023-01-01 | End: 2023-01-01 | Stop reason: HOSPADM

## 2023-01-01 RX ORDER — 0.9 % SODIUM CHLORIDE 0.9 %
1000 INTRAVENOUS SOLUTION INTRAVENOUS ONCE
Status: DISCONTINUED | OUTPATIENT
Start: 2023-01-01 | End: 2023-01-01

## 2023-01-01 RX ORDER — POTASSIUM CHLORIDE 20 MEQ/1
40 TABLET, EXTENDED RELEASE ORAL ONCE
Status: COMPLETED | OUTPATIENT
Start: 2023-01-01 | End: 2023-01-01

## 2023-01-01 RX ORDER — METOPROLOL TARTRATE 5 MG/5ML
2.5 INJECTION INTRAVENOUS
Status: ACTIVE | OUTPATIENT
Start: 2023-01-01 | End: 2023-01-01

## 2023-01-01 RX ORDER — ASPIRIN 81 MG/1
81 TABLET ORAL DAILY
Status: DISCONTINUED | OUTPATIENT
Start: 2023-01-01 | End: 2023-01-01 | Stop reason: HOSPADM

## 2023-01-01 RX ORDER — ATENOLOL 25 MG/1
25 TABLET ORAL ONCE
Status: DISCONTINUED | OUTPATIENT
Start: 2023-01-01 | End: 2023-01-01 | Stop reason: ALTCHOICE

## 2023-01-01 RX ORDER — OLOPATADINE HYDROCHLORIDE 2 MG/ML
1 SOLUTION/ DROPS OPHTHALMIC DAILY
Status: DISCONTINUED | OUTPATIENT
Start: 2023-01-01 | End: 2023-01-01

## 2023-01-01 RX ORDER — 0.9 % SODIUM CHLORIDE 0.9 %
1000 INTRAVENOUS SOLUTION INTRAVENOUS ONCE
Status: COMPLETED | OUTPATIENT
Start: 2023-01-01 | End: 2023-01-01

## 2023-01-01 RX ORDER — HYDROMORPHONE HYDROCHLORIDE 1 MG/ML
0.5 INJECTION, SOLUTION INTRAMUSCULAR; INTRAVENOUS; SUBCUTANEOUS EVERY 8 HOURS PRN
Status: DISCONTINUED | OUTPATIENT
Start: 2023-01-01 | End: 2023-01-01 | Stop reason: HOSPADM

## 2023-01-01 RX ORDER — HEPARIN SODIUM (PORCINE) LOCK FLUSH IV SOLN 100 UNIT/ML 100 UNIT/ML
1 SOLUTION INTRAVENOUS PRN
Status: DISCONTINUED | OUTPATIENT
Start: 2023-01-01 | End: 2023-01-01

## 2023-01-01 RX ORDER — GLYCOPYRROLATE 0.2 MG/ML
0.2 INJECTION INTRAMUSCULAR; INTRAVENOUS EVERY 4 HOURS PRN
Status: DISCONTINUED | OUTPATIENT
Start: 2023-01-01 | End: 2023-01-01 | Stop reason: HOSPADM

## 2023-01-01 RX ORDER — NOREPINEPHRINE BIT/0.9 % NACL 16MG/250ML
1-100 INFUSION BOTTLE (ML) INTRAVENOUS CONTINUOUS
Status: DISCONTINUED | OUTPATIENT
Start: 2023-01-01 | End: 2023-01-01 | Stop reason: HOSPADM

## 2023-01-01 RX ORDER — HEPARIN SODIUM (PORCINE) LOCK FLUSH IV SOLN 100 UNIT/ML 100 UNIT/ML
1 SOLUTION INTRAVENOUS EVERY 12 HOURS SCHEDULED
Status: DISCONTINUED | OUTPATIENT
Start: 2023-01-01 | End: 2023-01-01 | Stop reason: HOSPADM

## 2023-01-01 RX ORDER — DILTIAZEM HYDROCHLORIDE 5 MG/ML
10 INJECTION INTRAVENOUS ONCE
Status: COMPLETED | OUTPATIENT
Start: 2023-01-01 | End: 2023-01-01

## 2023-01-01 RX ORDER — AMLODIPINE BESYLATE 5 MG/1
2.5 TABLET ORAL DAILY
Status: DISCONTINUED | OUTPATIENT
Start: 2023-01-01 | End: 2023-01-01

## 2023-01-01 RX ORDER — FLUCONAZOLE 2 MG/ML
400 INJECTION, SOLUTION INTRAVENOUS EVERY 24 HOURS
Status: DISCONTINUED | OUTPATIENT
Start: 2023-01-01 | End: 2023-01-01 | Stop reason: HOSPADM

## 2023-01-01 RX ORDER — SODIUM CHLORIDE AND POTASSIUM CHLORIDE 150; 450 MG/100ML; MG/100ML
INJECTION, SOLUTION INTRAVENOUS CONTINUOUS
Status: DISCONTINUED | OUTPATIENT
Start: 2023-01-01 | End: 2023-01-01

## 2023-01-01 RX ORDER — LEVOTHYROXINE SODIUM 88 UG/1
88 TABLET ORAL DAILY
Status: DISCONTINUED | OUTPATIENT
Start: 2023-01-01 | End: 2023-01-01 | Stop reason: HOSPADM

## 2023-01-01 RX ORDER — POTASSIUM CITRATE 5 MEQ/1
10 TABLET, EXTENDED RELEASE ORAL EVERY EVENING
Status: DISCONTINUED | OUTPATIENT
Start: 2023-01-01 | End: 2023-01-01

## 2023-01-01 RX ORDER — SODIUM CHLORIDE 0.9 % (FLUSH) 0.9 %
5-40 SYRINGE (ML) INJECTION EVERY 12 HOURS SCHEDULED
Status: DISCONTINUED | OUTPATIENT
Start: 2023-01-01 | End: 2023-01-01

## 2023-01-01 RX ADMIN — ALBUTEROL SULFATE 2.5 MG: 2.5 SOLUTION RESPIRATORY (INHALATION) at 22:04

## 2023-01-01 RX ADMIN — SODIUM CHLORIDE 1 DROP: 50 SOLUTION OPHTHALMIC at 11:03

## 2023-01-01 RX ADMIN — PANTOPRAZOLE SODIUM 40 MG: 40 TABLET, DELAYED RELEASE ORAL at 05:59

## 2023-01-01 RX ADMIN — PANTOPRAZOLE SODIUM 40 MG: 40 TABLET, DELAYED RELEASE ORAL at 06:05

## 2023-01-01 RX ADMIN — SODIUM CHLORIDE 1 DROP: 50 SOLUTION OPHTHALMIC at 10:07

## 2023-01-01 RX ADMIN — OXYBUTYNIN CHLORIDE 5 MG: 5 TABLET ORAL at 09:50

## 2023-01-01 RX ADMIN — Medication 50 MCG/MIN: at 09:45

## 2023-01-01 RX ADMIN — SODIUM CHLORIDE 1 DROP: 50 SOLUTION OPHTHALMIC at 18:12

## 2023-01-01 RX ADMIN — ISOSORBIDE MONONITRATE 30 MG: 30 TABLET, EXTENDED RELEASE ORAL at 10:55

## 2023-01-01 RX ADMIN — IPRATROPIUM BROMIDE 0.5 MG: 0.5 SOLUTION RESPIRATORY (INHALATION) at 22:11

## 2023-01-01 RX ADMIN — Medication 0.2 MCG/KG/HR: at 09:55

## 2023-01-01 RX ADMIN — ALBUTEROL SULFATE 2.5 MG: 2.5 SOLUTION RESPIRATORY (INHALATION) at 17:05

## 2023-01-01 RX ADMIN — FLUCONAZOLE IN SODIUM CHLORIDE 400 MG: 2 INJECTION, SOLUTION INTRAVENOUS at 15:56

## 2023-01-01 RX ADMIN — DORZOLAMIDE HYDROCHLORIDE 1 DROP: 20 SOLUTION/ DROPS OPHTHALMIC at 09:52

## 2023-01-01 RX ADMIN — POTASSIUM CHLORIDE AND SODIUM CHLORIDE: 450; 150 INJECTION, SOLUTION INTRAVENOUS at 13:54

## 2023-01-01 RX ADMIN — MICONAZOLE NITRATE: 20 OINTMENT TOPICAL at 10:00

## 2023-01-01 RX ADMIN — SODIUM CHLORIDE 1 DROP: 50 SOLUTION OPHTHALMIC at 23:55

## 2023-01-01 RX ADMIN — IPRATROPIUM BROMIDE 0.5 MG: 0.5 SOLUTION RESPIRATORY (INHALATION) at 06:07

## 2023-01-01 RX ADMIN — SODIUM BICARBONATE 200 MEQ: 84 INJECTION, SOLUTION INTRAVENOUS at 09:45

## 2023-01-01 RX ADMIN — HEPARIN 100 UNITS: 100 SYRINGE at 21:51

## 2023-01-01 RX ADMIN — ISOSORBIDE MONONITRATE 30 MG: 30 TABLET, EXTENDED RELEASE ORAL at 10:04

## 2023-01-01 RX ADMIN — Medication: at 07:59

## 2023-01-01 RX ADMIN — METRONIDAZOLE 500 MG: 500 INJECTION, SOLUTION INTRAVENOUS at 22:49

## 2023-01-01 RX ADMIN — ALBUTEROL SULFATE 2.5 MG: 2.5 SOLUTION RESPIRATORY (INHALATION) at 00:26

## 2023-01-01 RX ADMIN — SODIUM CHLORIDE 1 DROP: 50 SOLUTION OPHTHALMIC at 09:51

## 2023-01-01 RX ADMIN — CEFTRIAXONE 2000 MG: 2 INJECTION, POWDER, FOR SOLUTION INTRAMUSCULAR; INTRAVENOUS at 20:14

## 2023-01-01 RX ADMIN — IPRATROPIUM BROMIDE 0.5 MG: 0.5 SOLUTION RESPIRATORY (INHALATION) at 22:23

## 2023-01-01 RX ADMIN — VANCOMYCIN HYDROCHLORIDE 250 MG: 10 INJECTION, POWDER, LYOPHILIZED, FOR SOLUTION INTRAVENOUS at 18:25

## 2023-01-01 RX ADMIN — IPRATROPIUM BROMIDE 0.5 MG: 0.5 SOLUTION RESPIRATORY (INHALATION) at 09:28

## 2023-01-01 RX ADMIN — OXYBUTYNIN CHLORIDE 5 MG: 5 TABLET ORAL at 11:45

## 2023-01-01 RX ADMIN — BRIMONIDINE TARTRATE 1 DROP: 2 SOLUTION OPHTHALMIC at 23:55

## 2023-01-01 RX ADMIN — SODIUM CHLORIDE 1 DROP: 50 SOLUTION OPHTHALMIC at 15:21

## 2023-01-01 RX ADMIN — Medication 10 ML: at 22:48

## 2023-01-01 RX ADMIN — METRONIDAZOLE 500 MG: 500 INJECTION, SOLUTION INTRAVENOUS at 13:41

## 2023-01-01 RX ADMIN — VANCOMYCIN HYDROCHLORIDE 250 MG: 10 INJECTION, POWDER, LYOPHILIZED, FOR SOLUTION INTRAVENOUS at 00:02

## 2023-01-01 RX ADMIN — SODIUM CHLORIDE 1 DROP: 50 SOLUTION OPHTHALMIC at 10:00

## 2023-01-01 RX ADMIN — PSYLLIUM HUSK 1 PACKET: 3.4 GRANULE ORAL at 10:05

## 2023-01-01 RX ADMIN — ALBUTEROL SULFATE 2.5 MG: 2.5 SOLUTION RESPIRATORY (INHALATION) at 01:20

## 2023-01-01 RX ADMIN — ALBUTEROL SULFATE 2.5 MG: 2.5 SOLUTION RESPIRATORY (INHALATION) at 06:19

## 2023-01-01 RX ADMIN — EPINEPHRINE 0.1 MCG/KG/MIN: 1 INJECTION INTRAMUSCULAR; INTRAVENOUS; SUBCUTANEOUS at 09:08

## 2023-01-01 RX ADMIN — METRONIDAZOLE 500 MG: 500 INJECTION, SOLUTION INTRAVENOUS at 13:54

## 2023-01-01 RX ADMIN — POTASSIUM CITRATE 10 MEQ: 5 TABLET ORAL at 17:15

## 2023-01-01 RX ADMIN — VANCOMYCIN HYDROCHLORIDE 250 MG: 10 INJECTION, POWDER, LYOPHILIZED, FOR SOLUTION INTRAVENOUS at 11:58

## 2023-01-01 RX ADMIN — IPRATROPIUM BROMIDE 0.5 MG: 0.5 SOLUTION RESPIRATORY (INHALATION) at 22:04

## 2023-01-01 RX ADMIN — LEVOTHYROXINE SODIUM 88 MCG: 0.09 TABLET ORAL at 05:59

## 2023-01-01 RX ADMIN — ALBUTEROL SULFATE 2.5 MG: 2.5 SOLUTION RESPIRATORY (INHALATION) at 21:18

## 2023-01-01 RX ADMIN — LATANOPROST 1 DROP: 50 SOLUTION OPHTHALMIC at 22:42

## 2023-01-01 RX ADMIN — WATER 1000 MG: 1 INJECTION INTRAMUSCULAR; INTRAVENOUS; SUBCUTANEOUS at 20:12

## 2023-01-01 RX ADMIN — PHENYLEPHRINE HYDROCHLORIDE 300 MCG/MIN: 50 INJECTION INTRAVENOUS at 09:45

## 2023-01-01 RX ADMIN — VANCOMYCIN HYDROCHLORIDE 250 MG: 10 INJECTION, POWDER, LYOPHILIZED, FOR SOLUTION INTRAVENOUS at 23:54

## 2023-01-01 RX ADMIN — METRONIDAZOLE 500 MG: 500 INJECTION, SOLUTION INTRAVENOUS at 05:59

## 2023-01-01 RX ADMIN — ASPIRIN 81 MG: 81 TABLET, COATED ORAL at 09:09

## 2023-01-01 RX ADMIN — HYDROMORPHONE HYDROCHLORIDE 0.5 MG: 1 INJECTION, SOLUTION INTRAMUSCULAR; INTRAVENOUS; SUBCUTANEOUS at 23:49

## 2023-01-01 RX ADMIN — ALBUTEROL SULFATE 2.5 MG: 2.5 SOLUTION RESPIRATORY (INHALATION) at 09:28

## 2023-01-01 RX ADMIN — ATENOLOL 25 MG: 25 TABLET ORAL at 10:54

## 2023-01-01 RX ADMIN — IPRATROPIUM BROMIDE 0.5 MG: 0.5 SOLUTION RESPIRATORY (INHALATION) at 06:10

## 2023-01-01 RX ADMIN — SODIUM CHLORIDE: 4.5 INJECTION, SOLUTION INTRAVENOUS at 10:11

## 2023-01-01 RX ADMIN — DORZOLAMIDE HYDROCHLORIDE 1 DROP: 20 SOLUTION/ DROPS OPHTHALMIC at 10:07

## 2023-01-01 RX ADMIN — BRIMONIDINE TARTRATE 1 DROP: 2 SOLUTION OPHTHALMIC at 21:52

## 2023-01-01 RX ADMIN — MIDAZOLAM 1 MG: 1 INJECTION INTRAMUSCULAR; INTRAVENOUS at 12:58

## 2023-01-01 RX ADMIN — WATER 1000 MG: 1 INJECTION INTRAMUSCULAR; INTRAVENOUS; SUBCUTANEOUS at 23:26

## 2023-01-01 RX ADMIN — OXYBUTYNIN CHLORIDE 5 MG: 5 TABLET ORAL at 21:27

## 2023-01-01 RX ADMIN — ATENOLOL 25 MG: 25 TABLET ORAL at 07:26

## 2023-01-01 RX ADMIN — METRONIDAZOLE 500 MG: 500 INJECTION, SOLUTION INTRAVENOUS at 21:48

## 2023-01-01 RX ADMIN — APIXABAN 5 MG: 5 TABLET, FILM COATED ORAL at 10:04

## 2023-01-01 RX ADMIN — IPRATROPIUM BROMIDE 0.5 MG: 0.5 SOLUTION RESPIRATORY (INHALATION) at 09:47

## 2023-01-01 RX ADMIN — ISOSORBIDE MONONITRATE 30 MG: 30 TABLET, EXTENDED RELEASE ORAL at 09:08

## 2023-01-01 RX ADMIN — MICONAZOLE NITRATE: 20 OINTMENT TOPICAL at 21:52

## 2023-01-01 RX ADMIN — FENTANYL CITRATE 25 MCG: 0.05 INJECTION, SOLUTION INTRAMUSCULAR; INTRAVENOUS at 00:01

## 2023-01-01 RX ADMIN — VANCOMYCIN HYDROCHLORIDE 250 MG: 10 INJECTION, POWDER, LYOPHILIZED, FOR SOLUTION INTRAVENOUS at 10:05

## 2023-01-01 RX ADMIN — DILTIAZEM HYDROCHLORIDE 5 MG/HR: 5 INJECTION INTRAVENOUS at 02:36

## 2023-01-01 RX ADMIN — IPRATROPIUM BROMIDE 0.5 MG: 0.5 SOLUTION RESPIRATORY (INHALATION) at 14:47

## 2023-01-01 RX ADMIN — DILTIAZEM HYDROCHLORIDE 10 MG: 5 INJECTION INTRAVENOUS at 11:41

## 2023-01-01 RX ADMIN — ASPIRIN 81 MG: 81 TABLET, COATED ORAL at 09:50

## 2023-01-01 RX ADMIN — DEXTROSE 1 MG/MIN: 5 SOLUTION INTRAVENOUS at 11:15

## 2023-01-01 RX ADMIN — DULOXETINE HYDROCHLORIDE 30 MG: 30 CAPSULE, DELAYED RELEASE ORAL at 09:09

## 2023-01-01 RX ADMIN — IPRATROPIUM BROMIDE 0.5 MG: 0.5 SOLUTION RESPIRATORY (INHALATION) at 17:05

## 2023-01-01 RX ADMIN — DORZOLAMIDE HYDROCHLORIDE 1 DROP: 20 SOLUTION/ DROPS OPHTHALMIC at 10:00

## 2023-01-01 RX ADMIN — IPRATROPIUM BROMIDE 0.5 MG: 0.5 SOLUTION RESPIRATORY (INHALATION) at 00:26

## 2023-01-01 RX ADMIN — BRIMONIDINE TARTRATE 1 DROP: 2 SOLUTION OPHTHALMIC at 10:00

## 2023-01-01 RX ADMIN — OXYBUTYNIN CHLORIDE 5 MG: 5 TABLET ORAL at 20:12

## 2023-01-01 RX ADMIN — ALBUTEROL SULFATE 2.5 MG: 2.5 SOLUTION RESPIRATORY (INHALATION) at 06:47

## 2023-01-01 RX ADMIN — BRIMONIDINE TARTRATE 1 DROP: 2 SOLUTION OPHTHALMIC at 10:07

## 2023-01-01 RX ADMIN — LATANOPROST 1 DROP: 50 SOLUTION OPHTHALMIC at 23:55

## 2023-01-01 RX ADMIN — SODIUM CHLORIDE 1 DROP: 50 SOLUTION OPHTHALMIC at 20:14

## 2023-01-01 RX ADMIN — DORZOLAMIDE HYDROCHLORIDE 1 DROP: 20 SOLUTION/ DROPS OPHTHALMIC at 23:55

## 2023-01-01 RX ADMIN — Medication 10 ML: at 11:00

## 2023-01-01 RX ADMIN — METRONIDAZOLE 500 MG: 500 INJECTION, SOLUTION INTRAVENOUS at 05:10

## 2023-01-01 RX ADMIN — ATENOLOL 50 MG: 50 TABLET ORAL at 10:04

## 2023-01-01 RX ADMIN — DORZOLAMIDE HYDROCHLORIDE 1 DROP: 20 SOLUTION/ DROPS OPHTHALMIC at 21:52

## 2023-01-01 RX ADMIN — METRONIDAZOLE 500 MG: 500 INJECTION, SOLUTION INTRAVENOUS at 21:29

## 2023-01-01 RX ADMIN — VANCOMYCIN HYDROCHLORIDE 250 MG: 10 INJECTION, POWDER, LYOPHILIZED, FOR SOLUTION INTRAVENOUS at 12:00

## 2023-01-01 RX ADMIN — SODIUM CHLORIDE 1000 ML: 9 INJECTION, SOLUTION INTRAVENOUS at 20:15

## 2023-01-01 RX ADMIN — DORZOLAMIDE HYDROCHLORIDE 1 DROP: 20 SOLUTION/ DROPS OPHTHALMIC at 22:42

## 2023-01-01 RX ADMIN — ALBUTEROL SULFATE 2.5 MG: 2.5 SOLUTION RESPIRATORY (INHALATION) at 11:56

## 2023-01-01 RX ADMIN — METRONIDAZOLE 500 MG: 500 INJECTION, SOLUTION INTRAVENOUS at 05:19

## 2023-01-01 RX ADMIN — APIXABAN 5 MG: 5 TABLET, FILM COATED ORAL at 09:09

## 2023-01-01 RX ADMIN — VANCOMYCIN HYDROCHLORIDE 250 MG: 10 INJECTION, POWDER, LYOPHILIZED, FOR SOLUTION INTRAVENOUS at 17:25

## 2023-01-01 RX ADMIN — WATER 1000 MG: 1 INJECTION INTRAMUSCULAR; INTRAVENOUS; SUBCUTANEOUS at 21:48

## 2023-01-01 RX ADMIN — BRIMONIDINE TARTRATE 1 DROP: 2 SOLUTION OPHTHALMIC at 09:20

## 2023-01-01 RX ADMIN — SODIUM CHLORIDE 1 DROP: 50 SOLUTION OPHTHALMIC at 13:18

## 2023-01-01 RX ADMIN — ALBUMIN (HUMAN) 50 G: 0.25 INJECTION, SOLUTION INTRAVENOUS at 01:35

## 2023-01-01 RX ADMIN — APIXABAN 5 MG: 5 TABLET, FILM COATED ORAL at 21:27

## 2023-01-01 RX ADMIN — LEVOTHYROXINE SODIUM 88 MCG: 0.09 TABLET ORAL at 05:52

## 2023-01-01 RX ADMIN — IPRATROPIUM BROMIDE 0.5 MG: 0.5 SOLUTION RESPIRATORY (INHALATION) at 06:19

## 2023-01-01 RX ADMIN — METRONIDAZOLE 500 MG: 500 INJECTION, SOLUTION INTRAVENOUS at 22:03

## 2023-01-01 RX ADMIN — PROCHLORPERAZINE EDISYLATE 5 MG: 5 INJECTION INTRAMUSCULAR; INTRAVENOUS at 23:25

## 2023-01-01 RX ADMIN — VANCOMYCIN HYDROCHLORIDE 250 MG: 10 INJECTION, POWDER, LYOPHILIZED, FOR SOLUTION INTRAVENOUS at 06:05

## 2023-01-01 RX ADMIN — METRONIDAZOLE 500 MG: 500 INJECTION, SOLUTION INTRAVENOUS at 06:08

## 2023-01-01 RX ADMIN — DIGOXIN 125 MCG: 0.25 INJECTION INTRAMUSCULAR; INTRAVENOUS at 01:55

## 2023-01-01 RX ADMIN — MICONAZOLE NITRATE: 20 OINTMENT TOPICAL at 11:20

## 2023-01-01 RX ADMIN — IOPAMIDOL 70 ML: 755 INJECTION, SOLUTION INTRAVENOUS at 23:12

## 2023-01-01 RX ADMIN — ALBUTEROL SULFATE 2.5 MG: 2.5 SOLUTION RESPIRATORY (INHALATION) at 06:13

## 2023-01-01 RX ADMIN — Medication 10 ML: at 10:07

## 2023-01-01 RX ADMIN — IPRATROPIUM BROMIDE 0.5 MG: 0.5 SOLUTION RESPIRATORY (INHALATION) at 21:18

## 2023-01-01 RX ADMIN — ALBUTEROL SULFATE 2.5 MG: 2.5 SOLUTION RESPIRATORY (INHALATION) at 17:55

## 2023-01-01 RX ADMIN — MAGNESIUM SULFATE HEPTAHYDRATE 2000 MG: 40 INJECTION, SOLUTION INTRAVENOUS at 20:19

## 2023-01-01 RX ADMIN — METRONIDAZOLE 500 MG: 500 INJECTION, SOLUTION INTRAVENOUS at 06:54

## 2023-01-01 RX ADMIN — SODIUM CHLORIDE 1 DROP: 50 SOLUTION OPHTHALMIC at 13:36

## 2023-01-01 RX ADMIN — DEXTROSE MONOHYDRATE 150 MG: 50 INJECTION, SOLUTION INTRAVENOUS at 11:04

## 2023-01-01 RX ADMIN — ALBUTEROL SULFATE 2.5 MG: 2.5 SOLUTION RESPIRATORY (INHALATION) at 18:07

## 2023-01-01 RX ADMIN — METOPROLOL TARTRATE 2.5 MG: 5 INJECTION, SOLUTION INTRAVENOUS at 05:55

## 2023-01-01 RX ADMIN — APIXABAN 5 MG: 5 TABLET, FILM COATED ORAL at 20:12

## 2023-01-01 RX ADMIN — DORZOLAMIDE HYDROCHLORIDE 1 DROP: 20 SOLUTION/ DROPS OPHTHALMIC at 11:02

## 2023-01-01 RX ADMIN — OXYBUTYNIN CHLORIDE 5 MG: 5 TABLET ORAL at 10:05

## 2023-01-01 RX ADMIN — ATENOLOL 25 MG: 25 TABLET ORAL at 09:08

## 2023-01-01 RX ADMIN — ALBUTEROL SULFATE 2.5 MG: 2.5 SOLUTION RESPIRATORY (INHALATION) at 22:11

## 2023-01-01 RX ADMIN — WATER 1000 MG: 1 INJECTION INTRAMUSCULAR; INTRAVENOUS; SUBCUTANEOUS at 22:42

## 2023-01-01 RX ADMIN — IPRATROPIUM BROMIDE 0.5 MG: 0.5 SOLUTION RESPIRATORY (INHALATION) at 05:15

## 2023-01-01 RX ADMIN — HYDROMORPHONE HYDROCHLORIDE 0.5 MG: 1 INJECTION, SOLUTION INTRAMUSCULAR; INTRAVENOUS; SUBCUTANEOUS at 10:59

## 2023-01-01 RX ADMIN — DULOXETINE HYDROCHLORIDE 30 MG: 30 CAPSULE, DELAYED RELEASE ORAL at 09:50

## 2023-01-01 RX ADMIN — AMLODIPINE BESYLATE 2.5 MG: 5 TABLET ORAL at 09:09

## 2023-01-01 RX ADMIN — Medication 10 ML: at 08:56

## 2023-01-01 RX ADMIN — LATANOPROST 1 DROP: 50 SOLUTION OPHTHALMIC at 21:30

## 2023-01-01 RX ADMIN — BRIMONIDINE TARTRATE 1 DROP: 2 SOLUTION OPHTHALMIC at 20:14

## 2023-01-01 RX ADMIN — IPRATROPIUM BROMIDE 0.5 MG: 0.5 SOLUTION RESPIRATORY (INHALATION) at 17:55

## 2023-01-01 RX ADMIN — EPINEPHRINE 0.1 MCG/KG/MIN: 1 INJECTION INTRAMUSCULAR; INTRAVENOUS; SUBCUTANEOUS at 09:45

## 2023-01-01 RX ADMIN — DORZOLAMIDE HYDROCHLORIDE 1 DROP: 20 SOLUTION/ DROPS OPHTHALMIC at 08:56

## 2023-01-01 RX ADMIN — IPRATROPIUM BROMIDE 0.5 MG: 0.5 SOLUTION RESPIRATORY (INHALATION) at 18:15

## 2023-01-01 RX ADMIN — MAGNESIUM SULFATE HEPTAHYDRATE 2000 MG: 40 INJECTION, SOLUTION INTRAVENOUS at 11:42

## 2023-01-01 RX ADMIN — METRONIDAZOLE 500 MG: 500 INJECTION, SOLUTION INTRAVENOUS at 13:17

## 2023-01-01 RX ADMIN — Medication 10 ML: at 09:21

## 2023-01-01 RX ADMIN — SODIUM CHLORIDE 1 DROP: 50 SOLUTION OPHTHALMIC at 17:15

## 2023-01-01 RX ADMIN — SODIUM CHLORIDE 1 DROP: 50 SOLUTION OPHTHALMIC at 21:29

## 2023-01-01 RX ADMIN — BRIMONIDINE TARTRATE 1 DROP: 2 SOLUTION OPHTHALMIC at 11:02

## 2023-01-01 RX ADMIN — APIXABAN 5 MG: 5 TABLET, FILM COATED ORAL at 09:50

## 2023-01-01 RX ADMIN — BRIMONIDINE TARTRATE 1 DROP: 2 SOLUTION OPHTHALMIC at 08:56

## 2023-01-01 RX ADMIN — METRONIDAZOLE 500 MG: 500 INJECTION, SOLUTION INTRAVENOUS at 15:20

## 2023-01-01 RX ADMIN — SODIUM CHLORIDE 1 DROP: 50 SOLUTION OPHTHALMIC at 21:52

## 2023-01-01 RX ADMIN — ALBUTEROL SULFATE 2.5 MG: 2.5 SOLUTION RESPIRATORY (INHALATION) at 06:07

## 2023-01-01 RX ADMIN — ALBUTEROL SULFATE 2.5 MG: 2.5 SOLUTION RESPIRATORY (INHALATION) at 09:47

## 2023-01-01 RX ADMIN — VANCOMYCIN HYDROCHLORIDE 250 MG: 10 INJECTION, POWDER, LYOPHILIZED, FOR SOLUTION INTRAVENOUS at 17:15

## 2023-01-01 RX ADMIN — IPRATROPIUM BROMIDE 0.5 MG: 0.5 SOLUTION RESPIRATORY (INHALATION) at 13:30

## 2023-01-01 RX ADMIN — SODIUM CHLORIDE 1 DROP: 50 SOLUTION OPHTHALMIC at 09:20

## 2023-01-01 RX ADMIN — DIGOXIN 125 MCG: 250 INJECTION, SOLUTION INTRAMUSCULAR; INTRAVENOUS; PARENTERAL at 03:20

## 2023-01-01 RX ADMIN — LIDOCAINE HYDROCHLORIDE 5 ML: 10 SOLUTION INTRAVENOUS at 08:31

## 2023-01-01 RX ADMIN — SODIUM CHLORIDE 1 DROP: 50 SOLUTION OPHTHALMIC at 22:42

## 2023-01-01 RX ADMIN — POTASSIUM CHLORIDE 20 MEQ: 1500 TABLET, EXTENDED RELEASE ORAL at 10:04

## 2023-01-01 RX ADMIN — SODIUM CHLORIDE 1 DROP: 50 SOLUTION OPHTHALMIC at 08:56

## 2023-01-01 RX ADMIN — ASPIRIN 81 MG: 81 TABLET, COATED ORAL at 10:05

## 2023-01-01 RX ADMIN — IPRATROPIUM BROMIDE 0.5 MG: 0.5 SOLUTION RESPIRATORY (INHALATION) at 06:47

## 2023-01-01 RX ADMIN — BRIMONIDINE TARTRATE 1 DROP: 2 SOLUTION OPHTHALMIC at 21:29

## 2023-01-01 RX ADMIN — DOXYCYCLINE 100 MG: 100 INJECTION, POWDER, LYOPHILIZED, FOR SOLUTION INTRAVENOUS at 20:19

## 2023-01-01 RX ADMIN — LEVOTHYROXINE SODIUM 88 MCG: 0.09 TABLET ORAL at 06:05

## 2023-01-01 RX ADMIN — BRIMONIDINE TARTRATE 1 DROP: 2 SOLUTION OPHTHALMIC at 22:42

## 2023-01-01 RX ADMIN — Medication 10 ML: at 10:47

## 2023-01-01 RX ADMIN — Medication: at 09:45

## 2023-01-01 RX ADMIN — KETOROLAC TROMETHAMINE 15 MG: 15 INJECTION, SOLUTION INTRAMUSCULAR; INTRAVENOUS at 13:33

## 2023-01-01 RX ADMIN — HYDROMORPHONE HYDROCHLORIDE 0.5 MG: 1 INJECTION, SOLUTION INTRAMUSCULAR; INTRAVENOUS; SUBCUTANEOUS at 23:09

## 2023-01-01 RX ADMIN — METRONIDAZOLE 500 MG: 500 INJECTION, SOLUTION INTRAVENOUS at 15:38

## 2023-01-01 RX ADMIN — ALBUTEROL SULFATE 2.5 MG: 2.5 SOLUTION RESPIRATORY (INHALATION) at 22:22

## 2023-01-01 RX ADMIN — SODIUM CHLORIDE 1 DROP: 50 SOLUTION OPHTHALMIC at 14:00

## 2023-01-01 RX ADMIN — ALBUTEROL SULFATE 2.5 MG: 2.5 SOLUTION RESPIRATORY (INHALATION) at 10:56

## 2023-01-01 RX ADMIN — SODIUM CHLORIDE 1 DROP: 50 SOLUTION OPHTHALMIC at 18:26

## 2023-01-01 RX ADMIN — MIDAZOLAM 2 MG: 1 INJECTION INTRAMUSCULAR; INTRAVENOUS at 10:01

## 2023-01-01 RX ADMIN — SODIUM BICARBONATE: 84 INJECTION, SOLUTION INTRAVENOUS at 11:00

## 2023-01-01 RX ADMIN — DORZOLAMIDE HYDROCHLORIDE 1 DROP: 20 SOLUTION/ DROPS OPHTHALMIC at 21:29

## 2023-01-01 RX ADMIN — IPRATROPIUM BROMIDE 0.5 MG: 0.5 SOLUTION RESPIRATORY (INHALATION) at 10:57

## 2023-01-01 RX ADMIN — DORZOLAMIDE HYDROCHLORIDE 1 DROP: 20 SOLUTION/ DROPS OPHTHALMIC at 20:14

## 2023-01-01 RX ADMIN — IPRATROPIUM BROMIDE 0.5 MG: 0.5 SOLUTION RESPIRATORY (INHALATION) at 06:12

## 2023-01-01 RX ADMIN — IPRATROPIUM BROMIDE 0.5 MG: 0.5 SOLUTION RESPIRATORY (INHALATION) at 01:20

## 2023-01-01 RX ADMIN — PANTOPRAZOLE SODIUM 40 MG: 40 TABLET, DELAYED RELEASE ORAL at 05:52

## 2023-01-01 RX ADMIN — WATER 1000 MG: 1 INJECTION INTRAMUSCULAR; INTRAVENOUS; SUBCUTANEOUS at 21:30

## 2023-01-01 RX ADMIN — ALBUTEROL SULFATE 2.5 MG: 2.5 SOLUTION RESPIRATORY (INHALATION) at 13:30

## 2023-01-01 RX ADMIN — FENTANYL CITRATE 25 MCG: 0.05 INJECTION, SOLUTION INTRAMUSCULAR; INTRAVENOUS at 14:59

## 2023-01-01 RX ADMIN — ALBUTEROL SULFATE 2.5 MG: 2.5 SOLUTION RESPIRATORY (INHALATION) at 06:10

## 2023-01-01 RX ADMIN — VASOPRESSIN 0.01 UNITS/MIN: 20 INJECTION INTRAVENOUS at 09:45

## 2023-01-01 RX ADMIN — FLUCONAZOLE IN SODIUM CHLORIDE 400 MG: 2 INJECTION, SOLUTION INTRAVENOUS at 16:21

## 2023-01-01 RX ADMIN — EPINEPHRINE 1 MCG/MIN: 1 INJECTION INTRAMUSCULAR; INTRAVENOUS; SUBCUTANEOUS at 11:19

## 2023-01-01 RX ADMIN — METRONIDAZOLE 500 MG: 500 INJECTION, SOLUTION INTRAVENOUS at 05:48

## 2023-01-01 RX ADMIN — ALBUTEROL SULFATE 2.5 MG: 2.5 SOLUTION RESPIRATORY (INHALATION) at 18:15

## 2023-01-01 RX ADMIN — PHENYLEPHRINE HYDROCHLORIDE 400 MCG/MIN: 50 INJECTION INTRAVENOUS at 11:42

## 2023-01-01 RX ADMIN — Medication 10 ML: at 10:46

## 2023-01-01 RX ADMIN — PSYLLIUM HUSK 1 PACKET: 3.4 GRANULE ORAL at 09:55

## 2023-01-01 RX ADMIN — SODIUM BICARBONATE 50 MEQ: 84 INJECTION, SOLUTION INTRAVENOUS at 08:20

## 2023-01-01 RX ADMIN — HYDROMORPHONE HYDROCHLORIDE 0.5 MG: 1 INJECTION, SOLUTION INTRAMUSCULAR; INTRAVENOUS; SUBCUTANEOUS at 18:04

## 2023-01-01 RX ADMIN — SODIUM CHLORIDE: 4.5 INJECTION, SOLUTION INTRAVENOUS at 11:02

## 2023-01-01 RX ADMIN — POTASSIUM CHLORIDE AND SODIUM CHLORIDE: 450; 150 INJECTION, SOLUTION INTRAVENOUS at 05:09

## 2023-01-01 RX ADMIN — POTASSIUM CITRATE 10 MEQ: 5 TABLET ORAL at 18:27

## 2023-01-01 RX ADMIN — IPRATROPIUM BROMIDE 0.5 MG: 0.5 SOLUTION RESPIRATORY (INHALATION) at 11:56

## 2023-01-01 RX ADMIN — SODIUM CHLORIDE: 4.5 INJECTION, SOLUTION INTRAVENOUS at 11:14

## 2023-01-01 RX ADMIN — IPRATROPIUM BROMIDE 0.5 MG: 0.5 SOLUTION RESPIRATORY (INHALATION) at 18:08

## 2023-01-01 RX ADMIN — BRIMONIDINE TARTRATE 1 DROP: 2 SOLUTION OPHTHALMIC at 09:52

## 2023-01-01 RX ADMIN — IOPAMIDOL 75 ML: 755 INJECTION, SOLUTION INTRAVENOUS at 17:41

## 2023-01-01 RX ADMIN — LATANOPROST 1 DROP: 50 SOLUTION OPHTHALMIC at 01:55

## 2023-01-01 RX ADMIN — POTASSIUM CHLORIDE 40 MEQ: 1500 TABLET, EXTENDED RELEASE ORAL at 20:22

## 2023-01-01 RX ADMIN — ALBUTEROL SULFATE 2.5 MG: 2.5 SOLUTION RESPIRATORY (INHALATION) at 05:15

## 2023-01-01 RX ADMIN — METRONIDAZOLE 500 MG: 500 INJECTION, SOLUTION INTRAVENOUS at 23:31

## 2023-01-01 RX ADMIN — MORPHINE SULFATE 1 MG: 2 INJECTION, SOLUTION INTRAMUSCULAR; INTRAVENOUS at 12:58

## 2023-01-01 RX ADMIN — ALBUTEROL SULFATE 2.5 MG: 2.5 SOLUTION RESPIRATORY (INHALATION) at 14:47

## 2023-01-01 RX ADMIN — DILTIAZEM HYDROCHLORIDE 5 MG/HR: 5 INJECTION INTRAVENOUS at 11:47

## 2023-01-01 RX ADMIN — SODIUM CHLORIDE 1 DROP: 50 SOLUTION OPHTHALMIC at 17:31

## 2023-01-01 RX ADMIN — DULOXETINE HYDROCHLORIDE 30 MG: 30 CAPSULE, DELAYED RELEASE ORAL at 10:04

## 2023-01-01 RX ADMIN — MICONAZOLE NITRATE: 20 OINTMENT TOPICAL at 15:40

## 2023-01-01 RX ADMIN — POTASSIUM CITRATE 10 MEQ: 5 TABLET ORAL at 17:24

## 2023-01-01 RX ADMIN — DORZOLAMIDE HYDROCHLORIDE 1 DROP: 20 SOLUTION/ DROPS OPHTHALMIC at 09:20

## 2023-01-01 RX ADMIN — VANCOMYCIN HYDROCHLORIDE 250 MG: 10 INJECTION, POWDER, LYOPHILIZED, FOR SOLUTION INTRAVENOUS at 17:05

## 2023-01-01 ASSESSMENT — PAIN SCALES - GENERAL
PAINLEVEL_OUTOF10: 4
PAINLEVEL_OUTOF10: 8
PAINLEVEL_OUTOF10: 8
PAINLEVEL_OUTOF10: 1
PAINLEVEL_OUTOF10: 0
PAINLEVEL_OUTOF10: 0
PAINLEVEL_OUTOF10: 8
PAINLEVEL_OUTOF10: 0
PAINLEVEL_OUTOF10: 7
PAINLEVEL_OUTOF10: 0
PAINLEVEL_OUTOF10: 8
PAINLEVEL_OUTOF10: 0

## 2023-01-01 ASSESSMENT — ENCOUNTER SYMPTOMS
NAUSEA: 0
COUGH: 0
VOMITING: 0
SHORTNESS OF BREATH: 0
ABDOMINAL PAIN: 0
CHEST TIGHTNESS: 0
SORE THROAT: 0
WHEEZING: 0
DIARRHEA: 0
BACK PAIN: 0
SINUS PRESSURE: 0

## 2023-01-01 ASSESSMENT — PAIN - FUNCTIONAL ASSESSMENT: PAIN_FUNCTIONAL_ASSESSMENT: PREVENTS OR INTERFERES SOME ACTIVE ACTIVITIES AND ADLS

## 2023-01-01 ASSESSMENT — PAIN DESCRIPTION - LOCATION
LOCATION: HIP
LOCATION: ABDOMEN
LOCATION: GENERALIZED;ABDOMEN
LOCATION: ABDOMEN;LEG
LOCATION: ABDOMEN
LOCATION: GENERALIZED;ABDOMEN

## 2023-01-01 ASSESSMENT — PULMONARY FUNCTION TESTS
PIF_VALUE: 17
PIF_VALUE: 24
PIF_VALUE: 26
PIF_VALUE: 27

## 2023-01-01 ASSESSMENT — PAIN DESCRIPTION - ORIENTATION
ORIENTATION: RIGHT;LEFT
ORIENTATION: RIGHT

## 2023-01-01 ASSESSMENT — PAIN DESCRIPTION - PAIN TYPE: TYPE: ACUTE PAIN

## 2023-01-01 ASSESSMENT — LIFESTYLE VARIABLES
HOW MANY STANDARD DRINKS CONTAINING ALCOHOL DO YOU HAVE ON A TYPICAL DAY: PATIENT DOES NOT DRINK
HOW OFTEN DO YOU HAVE A DRINK CONTAINING ALCOHOL: NEVER

## 2023-01-01 ASSESSMENT — PAIN DESCRIPTION - DESCRIPTORS: DESCRIPTORS: ACHING;DISCOMFORT;DULL

## 2023-01-11 ENCOUNTER — OUTSIDE SERVICES (OUTPATIENT)
Dept: PRIMARY CARE CLINIC | Age: 83
End: 2023-01-11

## 2023-01-11 DIAGNOSIS — I10 PRIMARY HYPERTENSION: ICD-10-CM

## 2023-01-11 DIAGNOSIS — E66.01 MORBID OBESITY (HCC): Primary | ICD-10-CM

## 2023-01-11 DIAGNOSIS — I48.91 ATRIAL FIBRILLATION WITH RAPID VENTRICULAR RESPONSE (HCC): ICD-10-CM

## 2023-01-11 DIAGNOSIS — M15.9 PRIMARY OSTEOARTHRITIS INVOLVING MULTIPLE JOINTS: ICD-10-CM

## 2023-01-24 NOTE — PLAN OF CARE
Problem: Falls - Risk of:  Goal: Will remain free from falls  Description  Will remain free from falls  4/20/2019 1325 by Trinity Lilly RN  Outcome: Met This Shift     Problem: Risk for Impaired Skin Integrity  Goal: Tissue integrity - skin and mucous membranes  Description  Structural intactness and normal physiological function of skin and  mucous membranes.   4/20/2019 1325 by Trinity Lilly RN  Outcome: Met This Shift     Problem: Nutrition  Goal: Optimal nutrition therapy  4/20/2019 1325 by Trinity Lilly RN  Outcome: Met This Shift Double Island Pedicle Flap Text: The defect edges were debeveled with a #15 scalpel blade.  Given the location of the defect, shape of the defect and the proximity to free margins a double island pedicle advancement flap was deemed most appropriate.  Using a sterile surgical marker, an appropriate advancement flap was drawn incorporating the defect, outlining the appropriate donor tissue and placing the expected incisions within the relaxed skin tension lines where possible.    The area thus outlined was incised deep to adipose tissue with a #15 scalpel blade.  The skin margins were undermined to an appropriate distance in all directions around the primary defect and laterally outward around the island pedicle utilizing iris scissors.  There was minimal undermining beneath the pedicle flap.

## 2023-03-01 ENCOUNTER — OUTSIDE SERVICES (OUTPATIENT)
Dept: PRIMARY CARE CLINIC | Age: 83
End: 2023-03-01

## 2023-03-01 DIAGNOSIS — E66.01 MORBID OBESITY (HCC): Primary | ICD-10-CM

## 2023-03-01 DIAGNOSIS — I10 PRIMARY HYPERTENSION: ICD-10-CM

## 2023-03-01 DIAGNOSIS — I48.91 ATRIAL FIBRILLATION WITH RAPID VENTRICULAR RESPONSE (HCC): ICD-10-CM

## 2023-03-01 DIAGNOSIS — G47.33 OBSTRUCTIVE SLEEP APNEA: ICD-10-CM

## 2023-03-01 PROBLEM — E44.0 MODERATE PROTEIN-CALORIE MALNUTRITION (HCC): Chronic | Status: RESOLVED | Noted: 2019-04-13 | Resolved: 2023-01-01

## 2023-03-15 PROBLEM — J15.9 COMMUNITY ACQUIRED BACTERIAL PNEUMONIA: Status: ACTIVE | Noted: 2023-01-01

## 2023-03-15 NOTE — ED PROVIDER NOTES
increasing fatigue and weakness unable to care for herself. Has a history of requiring extensive stays at nursing home and rehab facility. Denies chest pain or palpitations. Physical exam see above, fatigued and weak, morbidly obese female in no acute distress. Social factors affecting care: Currently living at home although previous requirements for extended care facility care  Chronic conditions affecting care: None  Chart reviewed: Previous admissions noted, has been admitted by Dr. Tania Walter several times, previous laboratory results reviewed compared to today's    Differential includes but not limited to: Dehydration, renal failure, electrolyte abnormality, hyperglycemia, hypoglycemia, DKA, pneumonia, MI, UTI, pneumonia, CHF, pleural effusion, general fatigue    Work up includes with interpretations: Lactic acid elevated at 3.3, repeat still pending. White count elevated 25.6 although chronic leukocytosis noted on chart review today's is slightly higher. Hemoglobin 12.5 and hematocrit 39.2 no significant anemia. CMP shows sodium 149 with chloride 108 both mildly elevated consistent with some mild dehydration, potassium low at 3.1, BUN 27 and creatinine 1.2 both mildly elevated reflective again of some mild dehydration and renal insufficiency. Albumin low at 2.9 reflective of not caring for herself well or eating well at home. Magnesium low at 1.5. Lipase normal 14. Beta hydroxybutyrate 0.74 not reflective of DKA, T46.8 TSH 0.86, not reflective of acute thyroid derangement, CK total 32 no rhabdomyolysis, troponin 60 1 repeat pending EKG shows atrial fibrillation with no ischemic findings. Chart review shows her to have a history of A-fib. Chest x-ray read by radiology shows bilateral infiltrates and/or atelectasis    Advanced directive discussion: None    Treatment in ER: P.o. potassium, IV magnesium, IV fluids, IV Rocephin, IV doxycycline    Consultations in ER:  Internal medicine for

## 2023-03-16 NOTE — ED NOTES
Pt report called to 4th floor. Pt stable and ready for transport.       Gordo Maurice RN  03/15/23 1072

## 2023-03-16 NOTE — ED NOTES
Pt had diarrhea. Myself and Ramone Shah RN cleaned up pt and applied new linens, pads, and brief.       Deyanira Hudson RN  03/15/23 2393

## 2023-03-17 NOTE — DISCHARGE INSTR - COC
Continuity of Care Form    Patient Name: Richard Oliva   :  1940  MRN:  [id number]    Admit date:  3/15/2023  Discharge date:  ***    Code Status Order: Full Code   Advance Directives:     Admitting Physician:  Susan Araujo DO  PCP: Mara Sellers DO    Discharging Nurse: [facility] Unit/Room#: 3747/2243-45  Discharging Unit Phone Number: ***    Emergency Contact:   Extended Emergency Contact Information  Primary Emergency Contact: Mack Moura  Address: [address] [facility], [address] [place] Force of [address] Phone: [phone]  Mobile Phone: [phone]  Relation: Spouse  Hearing or visual needs: None  Other needs: None  Preferred language: English   needed? No  Secondary Emergency Contact: [address] Phone: [phone]  Mobile Phone: [phone]  Relation: Other  Hearing or visual needs: None  Other needs: None  Preferred language: English   needed?  No    Past Surgical History:  Past Surgical History:   Procedure Laterality Date    CATARACT REMOVAL WITH IMPLANT  2011    left eye    CHOLECYSTECTOMY, LAPAROSCOPIC      COLONOSCOPY      CYSTOSCOPY  2012    cystoscopy, right ureteroscopy with retrograde pyelograms, stone manipulation, stent insertion, laser lithotripsy with holmium laser    HYSTERECTOMY (CERVIX STATUS UNKNOWN)      total    [place] FSTLJ SCLERA GLAUCOMA TRABECULECT AB EXTERNO Right 11/15/2018    RIGHT EYE REVISION OF XEN EYE STENT, TRABECULECTOMY performed by Eloisa Jones MD at [facility] (CERVIX REMOVED)  [facility]    for RLE DVT       Immunization History:   Immunization History   Administered Date(s) Administered    COVID-19, PFIZER GRAY top, DO NOT Dilute, (age 15 y+), IM, 30 mcg/0.3 mL 04/15/2022    COVID-19, PFIZER PURPLE top, DILUTE for use, (age 15 y+), 30mcg/0.3mL 2021, 2021, 10/15/2021

## 2023-03-17 NOTE — PLAN OF CARE
Problem: Skin/Tissue Integrity  Goal: Absence of new skin breakdown  Description: 1. Monitor for areas of redness and/or skin breakdown  2. Assess vascular access sites hourly  3. Every 4-6 hours minimum:  Change oxygen saturation probe site  4. Every 4-6 hours:  If on nasal continuous positive airway pressure, respiratory therapy assess nares and determine need for appliance change or resting period.   Outcome: Progressing     Problem: Safety - Adult  Goal: Free from fall injury  Outcome: Progressing     Problem: Pain  Goal: Verbalizes/displays adequate comfort level or baseline comfort level  Outcome: Progressing  Flowsheets (Taken 3/16/2023 2210)  Verbalizes/displays adequate comfort level or baseline comfort level: Encourage patient to monitor pain and request assistance

## 2023-03-20 PROBLEM — I48.11 LONGSTANDING PERSISTENT ATRIAL FIBRILLATION (HCC): Status: ACTIVE | Noted: 2023-01-01

## 2023-03-20 NOTE — FLOWSHEET NOTE
Inpatient Wound Care (Initial consult) 513-01    Admit Date: 3/15/2023  4:47 PM    Reason for consult:  Coccyx, arms, folds    Significant history: Per H&P    CHIEF COMPLAINT: Weakness     HISTORY OF PRESENT ILLNESS:        Patient was recently discharged home where she lives with her . .  However at home she has been extremely weak and unable to care for self. She normally uses a walker. However she is having trouble with ambulation. She does admit to mid abdominal pain for the last 2 days. She does admit to loose stools primarily after meals. She denies any falls. She denies any chest pain or shortness of breath. She denies any fever or chills. Findings:     03/20/23 1050   Skin Integumentary    Skin Integrity Ecchymosis; Weeping;Tear   Location BUE   Skin Integrity Site 2   Skin Integrity Location 2   (purple discoloration)   Location 2 left plantar foot   Skin Integrity Site 3   Skin Integrity Location 3 Excoriation    Location 3 abdominal folds, breast folds   Skin Integrity Site 4   Skin Integrity Location 4 Ecchymosis   Location 4 BLE   Skin Integrity Site 5   Skin Integrity Location 5   (erosion: bilateral buttocks and posterior thighs)   Location 11 old healed areas: bilateral buttocks   Wound 03/19/23 Sacrum   Date First Assessed/Time First Assessed: 03/19/23 1100   Present on Hospital Admission: Yes  Location: Sacrum   Wound Image    Dressing/Treatment Pharmaceutical agent (see MAR)   Wound Length (cm) 1.5 cm   Wound Width (cm) 0.8 cm   Wound Depth (cm) 0.1 cm   Wound Surface Area (cm^2) 1.2 cm^2   Wound Volume (cm^3) 0.12 cm^3   Wound Assessment Pink/red   Drainage Amount None   Odor None   Wound Thickness Description not for Pressure Injury Partial thickness     **Informed Consent**    The patient has given verbal consent to have photos taken of wounds and inserted into their chart as part of their permanent medical record for purposes of documentation, treatment management and/or medical

## 2023-03-20 NOTE — PROCEDURES
SL endurance cath Placement 3/20/2023    Product number: [id number]   Lot Number: [id number]   Consult: limited access   Ultrasound: yes   Left Antecubital vein:                Upper Arm Circumference: (CM) 35cm @ AC    Size:(FR)/GUAGE 20    Exposed Length: (CM) 0    Internal Length: (CM) 6cm   Cut: (CM) 0   Vein Measurement: 0.45cm    Tolerated well  Blood return noted  Difficulty drawing labs noted, given to RN  Charge RN noted consult to SURG for possible IJ access  Site flushes well  RN notified    Ilan Robledo RN  3/20/2023  3:41 PM

## 2023-03-21 PROBLEM — A41.9 SEPTIC SHOCK (HCC): Status: ACTIVE | Noted: 2023-01-01

## 2023-03-21 PROBLEM — E87.21 ACUTE METABOLIC ACIDOSIS: Status: ACTIVE | Noted: 2023-01-01

## 2023-03-21 PROBLEM — R65.21 SEPTIC SHOCK (HCC): Status: ACTIVE | Noted: 2023-01-01

## 2023-03-21 NOTE — ACP (ADVANCE CARE PLANNING)
Advance Care Planning   Healthcare Decision Maker:    Primary Decision Maker: Ben Charlette - [phone]    Secondary Decision Maker: Dariusz Shaikh - Other - [phone]    Supplemental (Other) Decision Maker: María Elena Cardona - Other - [phone]    Today we documented Decision Maker(s) consistent with ACP documents on file. Obtained copy of dpoa-hc placed in soft blue chart.  Electronically signed by KUNAL Quintero on 3/21/2023 at 10:04 AM

## 2023-03-21 NOTE — PROCEDURES
PROCEDURE:  Endotracheal Intubation    INDICATION:   Chronic hypoxic and hypercapnic respiratory failure    PROCEDURE :   Ulysses Duke, MD     CONSENT:   No Consent was obtained prior to the procedure. Seizure was emergent. PROCEDURE SUMMARY:      A time out was performed. The patient was placed on a cardiac monitor including continuous pulse oximetry. Rapid Sequence Intubation was conducted. The patient received sedation without paralytics. Using a MAC#4 laryngoscope and a size 8 endotracheal tube with stylet, the patient was intubated on the 1st attempt. The stylet was removed and cuff balloon was inflated. Appropriate endotracheal tube position was confirmed by direct visualization of vocal cord passage, fogging of the tube, CO2 colometric indicator and symmetric breath sounds. The tube was secured at 24 cm at the lips. Post intubation chest x-ray is pending at this time.         Electronically signed by Prince Rodrigo MD on 3/21/2023 at 10:37 AM

## 2023-03-21 NOTE — PROCEDURES
Ultrasound guidance and under a complete aseptic technique, left radial arterial line was inserted and sutured in place. Initial arterial waveform was obtained however line became dysfunctional later on.

## 2023-03-21 NOTE — CARE COORDINATION
3/21/2023 ICU, Vent/Sedation, Transferred from PCU. Vasopressor, Jose Alfredo gtt, Epi gtt, Na Bicarb gtt, Diflucan, Flagyl. [facility] (will need choiced) referral placed Chaitanya Redd following. [facility] SNF following. Per Max Last (covering for Oral) they have accepted and NO PRECERT is needed. When ready for DC, will NEED a Rapid Covid, HENS and DELIA signed. Pt full code- Palliative care consult may be beneficial.  CM following.  Electronically signed by Marianne Washburn RN-BC  on 3/21/2023 at 9:25 AM
Plan remains for patient to go to [facility] on DC. Per Myrtis Bloch there (covering for Ronnie rosenbaum) they have accepted and NO PRECRT is needed. When ready for DC, will NEED a Rapid Covid, FREDO and DELIA signed. SW/CM following.
Ss note: 3/17/43986:55 PM Neg covid test on 3-. Pt is on room air. Will need RAPID COVID prior to discharge. Per liaison Prateek Gold [phone] with [facility] ([phone]) pt has been accepted and bed available over the weekend, NO PRECERT. Follow up visit to room pt remains agreeable to plan. Will need HENS, signed DELIA and spouse will need notified when discharge is written.  VICKY Pal
functional level: Assistance with the following:, Mobility  Current functional level: Other (see comment), Assistance with the following: (therapy ordered. )    PT AM-PAC: 8 /24  OT AM-PAC: 11 /24    Family can provide assistance at DC: No    would you like Case Management to discuss the discharge plan with any other family members/significant others, and if so, who? Yes (spouse was present earlier but left before sw could meet with. spoke with spouse on phone after pt provided snf [facility])    Plans to Return to Present Housing: No (spouse relays cannot manage pt at home at this time.)  Other Identified Issues/Barriers to RETURNING to current housing: NO   Potential Assistance needed at discharge: [address] [place] [address]              Patient expects to discharge to: [facility] [address] for transportation at discharge: TBD    Financial    Payor: MEDICARE / Plan: MEDICARE PART A AND B / Product Type: *No Product type* /     Does insurance require precert for SNF: No    Potential assistance Purchasing Medications: No  Meds-to-Beds request: Yes      [facility]  for [address] [phone] - f [phone]  [address] [address]  Phone: [phone] Fax: [phone] [address] [phone] - f [phone]  [address] [place] [address]  Phone: [phone] [facility]ax: [facility] [address] [place] [address] [phone] Sara Cartwright [phone]  [place] [id number] [place] [address]  Phone: [phone] Fax: [phone]      Notes:    Ss note: 3/16/2023  2:52 PM Neg covid on 3-. Pt is on room air. Met with pt, she presents from home with spouse whom is not currently in the room, he went home. Discussed discharge planning and that pt will need rehab placement again.  Pt has a hx of Triptrotting

## 2023-03-21 NOTE — PROCEDURES
PROCEDURE: Left Internal jugular central venous catheter, U/S guided. INDICATION:   Central venous pressure measurement  PROCEDURE :   Royal Christie MD    CONSENT: Verbal Consent was obtained prior to the procedure. Indications, risks, and benefits were explained at length. ? PROCEDURE SUMMARY: A time-out was performed. The patients neck region was prepped and draped in sterile fashion using chlorhexidine scrub. Anesthesia was achieved with 1% lidocaine. The left internal jugular vein was accessed under ultrasound guidance using a finder needle and sheath. U/S was used. Venous blood was withdrawn and the sheath was advanced into the vein and the needle was withdrawn. A guidewire was advanced through the sheath. A small incision was made with a 10-blade scalpel and the sheath was exchanged for a dilator over a guidewire until appropriate dilation was obtained. The dilator was removed and an 8.5 Western Meg central venous three-lumen catheter was advanced over the guidewire and secured into place. At time of procedure completion, all ports aspirated and flushed properly. Post-procedure x-ray shows the tip of the catheter within the superior vena cava.     COMPLICATIONS:   NONE    ESTIMATED BLOOD LOSS:   5ml        Electronically signed by Thiago White MD on 3/21/2023 at 10:38 AM

## 2023-03-21 NOTE — SIGNIFICANT EVENT
[address] [place], [place] [address]ist RRT/Code Blue Note    Subjective:    Called to bedside for 2nd RRT in less than 2 hours. Patient obtunded on Bipap. Unable to get pulse ox and BP only 60/palp with doppler. Earlier this morning patient with RRT for similar concern. ABG at that time revealed significant metabolic acidosis and she was started on Bicarb drip. Patient now more obtunded per nursing then she was this AM. Not protecting airway and I feel she need intubated. For acute hypotension, she will be started on Levophed drip and transferred to ICU.      sodium bicarbonate        sodium bicarbonate  200 mEq IntraVENous Once    pantoprazole (PROTONIX) 40 mg injection  40 mg IntraVENous Daily    miconazole nitrate   Topical BID    lidocaine  5 mL IntraDERmal Once    sodium chloride flush  5-40 mL IntraVENous 2 times per day    heparin flush  1 mL IntraVENous 2 times per day    fluconazole  400 mg IntraVENous Q24H    vancomycin  250 mg Oral 4 times per day    atenolol  50 mg Oral Daily    potassium chloride  20 mEq Oral Daily with breakfast    sodium chloride flush  5-40 mL IntraVENous 2 times per day    [Held by provider] aspirin  81 mg Oral Daily    brimonidine  1 drop Both Eyes BID    levothyroxine  88 mcg Oral Daily    [Held by provider] isosorbide mononitrate  30 mg Oral Daily    DULoxetine  30 mg Oral Daily    dorzolamide  1 drop Left Eye BID    latanoprost  1 drop Left Eye Nightly    potassium citrate  10 mEq Oral QPM    psyllium  1 packet Oral BID    sodium chloride  1 drop Both Eyes 4x Daily    albuterol  2.5 mg Nebulization Q6H    And    ipratropium  0.5 mg Nebulization Q6H    [Held by provider] apixaban  5 mg Oral BID    metroNIDAZOLE  500 mg IntraVENous Q8H    oxybutynin  5 mg Oral BID     miconazole nitrate, , TID PRN  sodium chloride flush, 5-40 mL, PRN  sodium chloride, , PRN  heparin flush, 1 mL, PRN  HYDROmorphone, 0.5 mg, Q8H PRN  prochlorperazine, 5 mg, Q8H PRN  glucose, 4
Attempted tx with pt, however pt is going to be transferred to 5th floor per nsg 2* to low BP, global weeping edema and pt going to be placed on Cardizem drip. Will await new OT orders for eval and treatment when pt is appropriate. Thank you.    [place], [facility] Jessica Caro
Nebulization Q6H    And    ipratropium  0.5 mg Nebulization Q6H    [Held by provider] apixaban  5 mg Oral BID    metroNIDAZOLE  500 mg IntraVENous Q8H    oxybutynin  5 mg Oral BID     miconazole nitrate, , TID PRN  sodium chloride flush, 5-40 mL, PRN  sodium chloride, , PRN  heparin flush, 1 mL, PRN  HYDROmorphone, 0.5 mg, Q8H PRN  prochlorperazine, 5 mg, Q8H PRN  glucose, 4 tablet, PRN  dextrose bolus, 125 mL, PRN   Or  dextrose bolus, 250 mL, PRN  glucagon (rDNA), 1 mg, PRN  dextrose, , Continuous PRN  sodium chloride flush, 5-40 mL, PRN  sodium chloride, , PRN  polyethylene glycol, 17 g, Daily PRN  acetaminophen, 650 mg, Q6H PRN   Or  acetaminophen, 650 mg, Q6H PRN         Objective:    BP (!) 129/49   Pulse (!) 147   Temp 97.9 °F (36.6 °C) (Axillary)   Resp 29   Ht 5' 5\" (1.651 m)   Wt 295 lb (133.8 kg)   SpO2 98%   BMI 49.09 kg/m²     Constitutional:  Elderly, obese, awake, alert  Eyes: no scleral icterus, normal lids, no discharge  ENMT:  Normocephalic, atraumatic, mucosa dry, EOMI  Neck:  trachea midline, no JVD  Lungs:  CTA bilaterally, no audible rhonchi or wheezes noted, respirations deep and tachypneic, no retractions  Heart[de-identified]  IRR, tachycardic, no murmur, rub, or gallop noted during exam  Abd:  Soft, non tender, non distended, bowel sounds present  :  deferred  MSK: sarcopenia present  Ext:  Moving all extremities, non pitting edema, pulses present  Skin:  Warm and dry, no rashes on visible skin  Psych: mildly anxious affect  Neuro:  PERRL, Alert, grossly nonfocal; following commands and answering questions appropriately though speech difficult to hear    Recent Labs     03/19/23  0801      K 4.2   *   CO2 20*   BUN 29*   CREATININE 1.0   GLUCOSE 113*   CALCIUM 8.3*       Recent Labs     03/19/23  0801 03/19/23  1429   WBC 20.6*  --    RBC 3.56  --    HGB 10.7*  --    HCT 32.3* 34.8   MCV 90.7  --    MCH 30.1  --    MCHC 33.1  --    RDW 17.8*  --      --    MPV 9.5  --

## 2023-03-21 NOTE — DISCHARGE SUMMARY
with albumin 2.5.  WBC is 20.6 with globin 10.7.  Blood cultures came back this morning from 3/15 4 out of 4 bottles positive for diphtheroids.  Urine cultures have been ordered with urinalysis not being performed until 317 around noon.  Temperature is 98.4 with patient atrial fibrillation currently with blood pressure 124/68.  O2 sat 100% on room air at rest.  Infectious disease and oncology consult with the patient having persistent leukocytosis.  Major catheter will be inserted.  CT of the chest yesterday showed small bilateral effusions with atelectasis and pneumonia in the right and left lower lung.  Because the patient's abdominal pain seems to be getting worse we will start Toradol 50 mg IV push every 6 hours.  For moderate pain along with fentanyl 25 mcg IV push every 4 as needed for severe pain    3/20/2023  Patient seen and examined on PCU.  Patient still in atrial fibs.  Patient is much more alert today.  Patient does not seem in distinct pain at this time but when asked she says she still has some left lower quadrant abdominal pain.  Nursing did not know if she had a bowel movement yet this morning.  Temperature 97.7 with a heart rate of 101 blood pressure 100/71.  O2 sat 95% on room air at rest.  PICC line team consulted for insertion of midline and were unsuccessful this morning.  Urine output still inaccurate.  We will consult IR for PICC line.  Insertion of Major catheter for accurate I's and O's.  Consult general surgery with persistent complaints of abdominal pain.  With CT of the abdomen really not showing any significant changes.    3/21/2023  Patient seen examined on ICU.  Patient with increased heart rate of 547 about 1:30 in the morning according to vital signs in the chart and report given by on-call hospitalist this morning.  Blood pressure about 2 AM was 129/49 but heart rate 147.  Patient has been in atrial fib for past 3 days with heart rate fairly well controlled yesterday.  Blood

## 2023-03-21 NOTE — ACP (ADVANCE CARE PLANNING)
Advance Care Planning   Healthcare Decision Maker:    Primary Decision Maker: [facility] [phone]    Today we documented Decision Maker(s) consistent with Legal Next of Kin hierarchy. Living Will in [facility]- no DPOA-HC documents noted in epic at this time.      Electronically signed by Carlos Eduardo Schroeder RN-BC on 3/21/2023 at 8:41 AM

## 2023-03-21 NOTE — CONSULTS
Assessment and Plan  Patient is a 80 y.o. female with the following medical Problems:   Severe sepsis with septic shock  Metabolic acidosis  Acute kidney injury  Metabolic encephalopathy  A-fib with RVR  Obstructive sleep apnea  Heart failure with preserved ejection fraction  Morbid obesity with obesity hypoventilation syndrome  Pulmonary hypertension  Atelectasis  Small left-sided pleural effusion  Suspected colitis    Plan of care:  Broaden antimicrobial coverage. ID team will be notified  DVT prophylaxis with heparin subcu  N.p.o. for now  Continue bicarbonate drip  Goals of care were discussed with the patient's . Patient is DNR CCA. GI prophylaxis  Hold antihypertensive medication    History of Present Illness:   Patient is a 80-year-old woman who was admitted on March 15, 2023 with weakness. Patient was followed up by the infectious disease team, cardiology, and general surgery for sepsis, atrial fibrillation, and proctitis respectively. Patient became profoundly hypotensive this morning and obtunded. She was transferred emergently to the ICU and was intubated immediately after arrival.  She is currently on multiple pressors with very low urine output. Goals of care were discussed with the patient  and patient was changed to DNR CCA. Concepcion Leung is being reviewed to define goals of care. Labs were ordered including lactic acid.     Past Medical History:  Past Medical History:   Diagnosis Date    Cataract     right eye    Cholecystitis with cholelithiasis     Diverticulitis     Diverticulosis     DJD (degenerative joint disease)     DVT (deep venous thrombosis) (HCC)     RLE    Fibroids     Glaucoma     bilateral    HTN (hypertension)     Hyperlipidemia     states on med for precaution    Morbid obesity ([facility] [address]     GLENNA on CPAP     Osteoarthritis     PE (pulmonary embolism)     Renal cyst     right      Past Surgical History:   Procedure Laterality Date    CATARACT REMOVAL WITH IMPLANT
GENERAL SURGERY  CONSULT NOTE  3/20/2023    Chief Complaint   Patient presents with    Other     Fail to thrive. Recent D/C from NH. Unable to get outta bed or care for herself. DAWIT Cisneros is a 80 y.o. female for whom general surgery was consulted for the evaluation of persistent left lower quadrant abdominal pain and diarrhea. The patient initially presented to the hospital on March 15 with generalized weakness and fatigue. She presented from her facility. Since her admission to the hospital she has been complaining of left lower quadrant abdominal pain. She has also had diarrhea since this time. She has multiple episodes of diarrhea over the past couple days. She is empirically treated for C. difficile infection and was started on oral vancomycin. Given that the pain was not resolving, general surgery was consulted for evaluation. She has a significant past medical history of atrial fibrillation with RVR requiring diltiazem drip during her stay which is now off. Leukocytosis has been greater than 20,000. She continues to have some diarrhea. On exam she has left lower quadrant tenderness to palpation. The pain is moderately tender and she rates the pain as a 7-10 out of 10 with palpation. There is no peritonitis. The pain does not go anywhere. Is worse with palpation and better with pain medications. She states that she has had a colonoscopy in the past but it was many years ago. She notes that they found diverticulosis at that time. She has had diverticulitis in the past and has undergone treatment with antibiotics approximately 5 times she reports. When she found out that it was with the surgical team she became tearful and said that she did not want any surgery.     Patient Active Problem List   Diagnosis    Acute renal failure (ARF) (HCC)    Pyelonephritis    Obstructive uropathy    HTN (hypertension)    Hyperlipidemia    Obstructive sleep apnea    Glaucoma    DJD
Nutrition Note    Nutrition Evaluation:    Continue to provide comfort care measures. Dietitian available per consult.      Electronically signed by Lien Zaman MS, RD, LD on 3/21/23 at 11:44 AM EDT    Contact: [phone]
10 mL at 03/16/23 0921    sodium chloride flush 0.9 % injection 5-40 mL  5-40 mL IntraVENous PRN [facility], DO        0.9 % sodium chloride infusion   [facility]        polyethylene glycol (GLYCOLAX) packet 17 g  17 g Oral Daily PRN [facility], DO        acetaminophen (TYLENOL) tablet 650 mg  650 mg Oral Q6H PRN [facility], DO        Or    acetaminophen (TYLENOL) suppository 650 mg  650 mg Rectal Q6H PRN [facility], DO        aspirin EC tablet 81 mg  [facility]   81 mg at 03/16/23 0909    atenolol (TENORMIN) tablet 25 mg  25 mg Oral Daily [facility]   25 mg at 03/16/23 0908    brimonidine (ALPHAGAN) 0.2 % ophthalmic solution 1 drop  1 drop Both Eyes BID [facility]   1 drop at 03/16/23 0920    pantoprazole (PROTONIX) tablet 40 mg  40 mg Oral QAM AC Ismail U Gregorio, DO   40 mg at 03/16/23 0559    levothyroxine (SYNTHROID) tablet 88 mcg  88 mcg Oral Daily [facility]   88 mcg at 03/16/23 0559    isosorbide mononitrate (IMDUR) extended release tablet 30 mg  30 mg Oral Daily [facility]   30 mg at 03/16/23 0908    DULoxetine (CYMBALTA) extended release capsule 30 mg  30 mg Oral Daily [facility]   30 mg at 03/16/23 0909    amLODIPine (NORVASC) tablet 2.5 mg  2.5 mg Oral Daily [facility], DO   2.5 mg at 03/16/23 0909    dorzolamide (TRUSOPT) 2 % ophthalmic solution 1 drop  1 drop Left Eye BID [facility], DO   1 drop at 03/16/23 0920    latanoprost (XALATAN) 0.005 % ophthalmic solution 1 drop  1 drop Left Eye Nightly Ismail U Gregorio, DO        potassium citrate (UROCIT-K) extended release tablet 10 mEq  10 mEq Oral QPM Ismail U Gregorio, DO        psyllium (KONSYL) 28.3 % packet 1 packet  1 packet Oral BID Constantino Wall DO        sodium chloride (CANDY 128) 5 % ophthalmic solution 1 drop  1 drop Both Eyes 4x Daily Constantino Wall DO   1 drop at 03/16/23 0920    albuterol (PROVENTIL) nebulizer
not taking: Reported on 3/16/2023)  atenolol (TENORMIN) 25 MG tablet, Take 1 tablet by mouth daily  ascorbic acid (VITAMIN C) 1000 MG tablet, Take 1 tablet by mouth daily  apixaban (ELIQUIS) 5 MG TABS tablet, Take 1 tablet by mouth 2 times daily  potassium citrate (UROCIT-K) 10 MEQ (1080 MG) extended release tablet, Take by mouth daily  DULoxetine (CYMBALTA) 30 MG extended release capsule, Take 30 mg by mouth daily  isosorbide mononitrate (IMDUR) 30 MG extended release tablet, Take 30 mg by mouth daily  zinc gluconate 50 MG tablet, Take 50 mg by mouth daily  Boswellia-Glucosamine-Vit D (OSTEO BI-FLEX ONE PER DAY PO), Take 1 tablet by mouth daily  sodium chloride (CANDY 128) 5 % ophthalmic solution, Place 1 drop into both eyes 4 times daily  latanoprost (XALATAN) 0.005 % ophthalmic solution, Place 1 drop into the left eye nightly  dorzolamide (TRUSOPT) 2 % ophthalmic solution, Place 1 drop into the left eye in the morning and at bedtime  brimonidine (ALPHAGAN) 0.2 % ophthalmic solution, Place 1 drop into both eyes in the morning and at bedtime  olopatadine (PATADAY) 0.2 % SOLN ophthalmic solution, Place 1 drop into both eyes daily (Patient not taking: Reported on 3/16/2023)  levothyroxine (SYNTHROID) 88 MCG tablet, Take 1 tablet by mouth Daily  pantoprazole (PROTONIX) 40 MG tablet, Take 1 tablet by mouth every morning (before breakfast)  oxybutynin (DITROPAN) 5 MG tablet, Take 1 tablet by mouth 2 times daily  hydrocortisone 2.5 % cream, Apply topically 2 times daily.  (Patient taking differently: Apply 1 application topically 2 times daily Apply topically 2 times daily to where skin rash noted)  Psyllium (METAMUCIL FIBER PO), Take by mouth 2 times daily  aspirin 81 MG EC tablet, Take 81 mg by mouth daily Pt instructed to check hold with dr. Terrance Powers, Take 1,000 mg by mouth daily Ld 11/9/2018 (Patient not taking: Reported on 11/28/2022)  Docusate Sodium (DOCULASE PO), Take 100 mg by mouth in the morning and at
tobacco.  ETOH:   reports no history of alcohol use. Home Medications  Prior to Admission medications    Medication Sig Start Date End Date Taking? Authorizing Provider   albuterol-ipratropium (COMBIVENT RESPIMAT)  MCG/ACT AERS inhaler Inhale 1 puff into the lungs in the morning and 1 puff at noon and 1 puff in the evening and 1 puff before bedtime. 12/14/22   Stephen Bartlett DO   miconazole (MICOTIN) 2 % powder Apply topically 2 times daily.   Patient not taking: Reported on 3/16/2023 12/14/22   Stephen Bartlett DO   atenolol (TENORMIN) 25 MG tablet Take 1 tablet by mouth daily 12/1/22   Stephen Bartlett DO   ascorbic acid (VITAMIN C) 1000 MG tablet Take 1 tablet by mouth daily 12/1/22   Stephen Bartlett DO   apixaban (ELIQUIS) 5 MG TABS tablet Take 1 tablet by mouth 2 times daily 11/30/22   Stephen Bartlett DO   potassium citrate (UROCIT-K) 10 MEQ (1080 MG) extended release tablet Take by mouth daily    Historical Provider, MD   DULoxetine (CYMBALTA) 30 MG extended release capsule Take 30 mg by mouth daily    Historical Provider, MD   isosorbide mononitrate (IMDUR) 30 MG extended release tablet Take 30 mg by mouth daily    Historical Provider, MD   zinc gluconate 50 MG tablet Take 50 mg by mouth daily    Historical Provider, MD   Boswellia-Glucosamine-Vit D (OSTEO BI-FLEX ONE PER DAY PO) Take 1 tablet by mouth daily    Historical Provider, MD   sodium chloride (CANDY 128) 5 % ophthalmic solution Place 1 drop into both eyes 4 times daily    Historical Provider, MD   latanoprost (XALATAN) 0.005 % ophthalmic solution Place 1 drop into the left eye nightly    Historical Provider, MD   dorzolamide (TRUSOPT) 2 % ophthalmic solution Place 1 drop into the left eye in the morning and at bedtime    Historical Provider, MD   brimonidine (ALPHAGAN) 0.2 % ophthalmic solution Place 1 drop into both eyes in the morning and at bedtime    Historical Provider, MD   olopatadine (PATADAY) 0.2 % SOLN ophthalmic solution
Detected Not Detected    SARS-CoV-2, PCR Not Detected Not Detected    Human Metapneumovirus by PCR Not Detected Not Detected    Human Rhinovirus/Enterovirus by PCR Not Detected Not Detected    Influenza A by PCR Not Detected Not Detected    Influenza B by PCR Not Detected Not Detected    Mycoplasma pneumoniae by PCR Not Detected Not Detected    Parainfluenza Virus 1 by PCR Not Detected Not Detected    Parainfluenza Virus 2 by PCR Not Detected Not Detected    Parainfluenza Virus 3 by PCR Not Detected Not Detected    Parainfluenza Virus 4 by PCR Not Detected Not Detected    Respiratory Syncytial Virus by PCR Not Detected Not Detected      Component 3/15/23 2300   C. diff Toxin/Antigen C. Difficile Toxin A and/or B NOT detected   Normal Range: Not detected      Component Ref Range & Units 3/15/23 2300 3/15/23 1855 12/10/22 0717 12/9/22 2111 12/9/22 1629 11/28/22 1908 11/28/22 1122   Troponin, High Sensitivity 0 - 9 ng/L 62 High   61 High  CM  42 High  CM  45 High  CM  44 High  CM  35 High  CM  39 High  CM      Component Ref Range & Units 3/15/23 1855   Beta-Hydroxybutyrate 0.02 - 0.27 mmol/L 0.74 High            Assessment and plan to follow as per Dr. Giorgio Pinto. NOTE: This report was transcribed using voice recognition software. Every effort was made to ensure accuracy; however, inadvertent computerized transcription errors may be present. Willy Tran, [address] [place] [address] Cardiology    Electronically signed by Rosanne Caldwell PA-C on 3/20/2023 at 8:26 AM      I independently interviewed and examined the patient. I have reviewed the above documentation completed by the LOVELY. Please see my additional contributions to the HPI, physical exam, and assessment / medical decision making. I contributed  more than 51% of patient care. 70-year-old with multiple medical problems presenting with diarrhea and weakness.       Review of Systems:  Cardiac: As per HPI  General: No fever, chills  Pulmonary: As per HPI  GI: No

## 2023-03-22 LAB
BACTERIA STL CULT: NORMAL
HAV IGM SERPL QL IA: NORMAL
HBV CORE IGM SERPL QL IA: NORMAL
HBV SURFACE AG SERPL QL IA: NORMAL
HCV AB SERPL QL IA: NORMAL

## 2023-03-23 LAB
(1,3)-BETA-D-GLUCAN (FUNGITELL) INTERPRETATION: POSITIVE
1,3 BETA GLUCAN SER-MCNC: 498 PG/ML

## 2023-03-23 NOTE — PROGRESS NOTES
3167- Patient arrives to ICU-6 via monitored bed accompanied by RRT team.
56- Dr. Caridad Lyles notified of patient's passing  [phone]- Dr. Jennifer Ibarra notified of patient's passing   [phone]- Dr. Ericka Calderon notified of patient's passing via perfectserve  [phone] Dr. German Bull aware of patient's passing. States he will sign the death certificate. Branden notified of patient's passing via office. 26- Dr. Zeus Rock notified of patient's passing via perfectserve. 18- Dr. Morales Philip notified of patient's passing via office.
At patient's bedside for assessment. This RN as well as C. Watson Koyanagi RN assessed patient to be absent of respirations, absent pulse, absent blood pressure, as well as fixed pupils that are unresponsive to light. Dr. Twin Varela notified. Family at bedside.
Department of Internal Medicine  PN    PCP: Gricelda Evans DO  Admitting Physician: Dr. Haylie Obrien  Consultants:   Date of Service: 3/15/2023    CHIEF COMPLAINT: Weakness    HISTORY OF PRESENT ILLNESS:    Patient is a 1year-old female who presented to the ED due to weakness. Patient was recently discharged home where she lives with her . .  However at home she has been extremely weak and unable to care for self. She normally uses a walker. However she is having trouble with ambulation. She does admit to mid abdominal pain for the last 2 days. She does admit to loose stools primarily after meals. She denies any falls. She denies any chest pain or shortness of breath. She denies any fever or chills. 3/16/2023  Patient seen and examined on telemetry floor. Patient complaining of left lower quadrant abdominal pain. On exam the patient's abdomen is very soft with the patient being significantly obese. There is no guarding on palpation. Patient stated that it did not hurt worse on palpation but states it hurts off and on associated with eating. She denies any chest pain, dizziness or unusual shortness of breath. Patient is very tearful. Temperature 98.3 with heart rate 88 blood pressure 163/90. O2 sat 97% room air at rest.  CT of the abdomen pelvis showed mild proctitis with indeterminate renal lesion. Patient states she is worried about having diarrhea. 3/17/2023  Patient seen examined on telemetry floor. Patient is alert oriented person place. Patient denies any chest pain, abdominal pain, nausea vomiting or unusual shortness of breath. BUN/creatinine was 29/1.17 sodium 147. Transaminases normal with a WBC 23.7 with hemoglobin 11.0. Blood cultures show 1/2 bottles positive for gram positive rods diphtheroid like. Viral respiratory panel is unremarkable. Temperature 98 with heart rate 95 and blood pressure currently 97/61 with the patient blood pressure yesterday 150/89.   Patient heart
Department of Internal Medicine  PN    PCP: Mara Sellers DO  Admitting Physician: Dr. Jose Hammond  Consultants:   Date of Service: 3/15/2023    CHIEF COMPLAINT: Weakness    HISTORY OF PRESENT ILLNESS:    Patient is a 1year-old female who presented to the ED due to weakness. Patient was recently discharged home where she lives with her . .  However at home she has been extremely weak and unable to care for self. She normally uses a walker. However she is having trouble with ambulation. She does admit to mid abdominal pain for the last 2 days. She does admit to loose stools primarily after meals. She denies any falls. She denies any chest pain or shortness of breath. She denies any fever or chills. 3/16/2023  Patient seen and examined on telemetry floor. Patient complaining of left lower quadrant abdominal pain. On exam the patient's abdomen is very soft with the patient being significantly obese. There is no guarding on palpation. Patient stated that it did not hurt worse on palpation but states it hurts off and on associated with eating. She denies any chest pain, dizziness or unusual shortness of breath. Patient is very tearful. Temperature 98.3 with heart rate 88 blood pressure 163/90. O2 sat 97% room air at rest.  CT of the abdomen pelvis showed mild proctitis with indeterminate renal lesion. Patient states she is worried about having diarrhea. 3/17/2023  Patient seen examined on telemetry floor. Patient is alert oriented person place. Patient denies any chest pain, abdominal pain, nausea vomiting or unusual shortness of breath. BUN/creatinine was 29/1.17 sodium 147. Transaminases normal with a WBC 23.7 with hemoglobin 11.0. Blood cultures show 1/2 bottles positive for gram positive rods diphtheroid like. Viral respiratory panel is unremarkable. Temperature 98 with heart rate 95 and blood pressure currently 97/61 with the patient blood pressure yesterday 150/89.   Patient heart
Dr. David Mendez notified of critical lab values.
Goals of care were discussed with the patient's  and family regarding continuing decline on multiple pressors. Patient has an PAMELA and extremely encephalopathic and profoundly hypotensive on multiple pressors. Family decided to proceed with comfort care measures. He was at the bedside.
[facility] called with referral. Follow-up call returned. Per Jerome Noguera, they are not going to follow this patient and to proceed with withdrawal of care per the family's wishes.
Notified Dr. Waleska Barber pat still in afib 140-150; /75
PROGRESS NOTE  By Laila Jesus M.D. [facility]  Dr. Lv Malcolm M.D.,  Dr. Sara Caldwell M.D.,   Dr. Tomi Wick D.O.,  Dr. Dyke Dubin, M.D.,  Dr. Sergio Valle D.O.,          Ronni Beebe  80 y.o.  female    SUBJECTIVE:  Improved abdominal pain; resolved diarrhea    OBJECTIVE:    BP (!) 109/57   Pulse (!) 109   Temp 98 °F (36.7 °C) (Oral)   Resp 20   Ht 5' 5\" (1.651 m)   Wt 294 lb 6 oz (133.5 kg)   SpO2 95%   BMI 48.99 kg/m²     General: NAD/morbidly obese  female. AAOx3  HEENT: Anicteric sclera/moist oral mucosa  Neck: Supple/trachea midline  Chest: Symmetric excursion/lnonabored respirations  Cor: Regular  Abd.: Soft and obese. Nontender  Extr.:  BLE 2-3+ edema  Skin: Warm and dry      DATA:    Monitor data reviewed -atrial fibrillation noted.        Lab Results   Component Value Date/Time    WBC 23.7 03/17/2023 07:54 AM    RBC 3.69 03/17/2023 07:54 AM    HGB 11.0 03/17/2023 07:54 AM    HCT 34.2 03/17/2023 07:54 AM    MCV 92.7 03/17/2023 07:54 AM    MCH 29.8 03/17/2023 07:54 AM    MCHC 32.2 03/17/2023 07:54 AM    RDW 18.1 03/17/2023 07:54 AM     03/17/2023 07:54 AM    MPV 9.9 03/17/2023 07:54 AM     Lab Results   Component Value Date/Time     03/17/2023 07:54 AM    K 4.1 03/17/2023 07:54 AM    K 4.0 12/09/2022 04:29 PM     03/17/2023 07:54 AM    CO2 19 03/17/2023 07:54 AM    BUN 29 03/17/2023 07:54 AM    CREATININE 1.1 03/17/2023 07:54 AM    CALCIUM 8.1 03/17/2023 07:54 AM    PROT 4.8 03/17/2023 07:54 AM    LABALBU 2.4 03/17/2023 07:54 AM    LABALBU 3.9 05/24/2012 02:10 PM    BILITOT 0.6 03/17/2023 07:54 AM    ALKPHOS 92 03/17/2023 07:54 AM    AST 20 03/17/2023 07:54 AM    ALT 7 03/17/2023 07:54 AM     Lab Results   Component Value Date/Time    LIPASE 14 03/15/2023 06:55 PM     Lab Results   Component Value Date/Time    AMYLASE 32 03/27/2012 12:50 PM         ASSESSMENT/PLAN:  Patient Active Problem List   Diagnosis    Acute renal failure (ARF)
PROGRESS NOTE  By RANDALL Guerrero    [facility]  Dr. Zacarias Willis M.D.,  Dr. Sagrario Curry M.D.,   Dr. Steven Rea D.CLARITZA.,  Dr. Colleen Neal M.D.,  Dr. Willa Donovan D.O.,          France Danielson  80 y.o.  female    SUBJECTIVE:  Patient resting in bed. On BiPAP. Denies abdominal pain. Denies nausea or vomiting. Discussed dysphagia. Patient reports that she only has difficulty in swallowing pills. States that she has had no difficulty swallowing food. Speech therapy notes reviewed indicating that patient regurgitated/vomited solid foods almost immediately. OBJECTIVE:    /81   Pulse (!) 135   Temp 97.5 °F (36.4 °C) (Oral)   Resp 21   Ht 5' 5\" (1.651 m)   Wt 294 lb 6 oz (133.5 kg)   SpO2 95%   BMI 48.99 kg/m²     General: NAD/morbidly obese  female. AAOx3  HEENT: Anicteric sclera/moist oral mucosa  Neck: Supple/trachea midline  Chest: Symmetric excursion/lnonabored respirations  Cor: Regular  Abd.: Soft and obese. Nontender  Extr.:  BLE 2-3+ edema  Skin: Warm and dry      DATA:    Monitor data reviewed -atrial fibrillation noted.        Lab Results   Component Value Date/Time    WBC 21.9 03/18/2023 04:46 AM    RBC 3.96 03/18/2023 04:46 AM    HGB 11.6 03/18/2023 04:46 AM    HCT 38.4 03/18/2023 04:46 AM    MCV 97.0 03/18/2023 04:46 AM    MCH 29.3 03/18/2023 04:46 AM    MCHC 30.2 03/18/2023 04:46 AM    RDW 17.9 03/18/2023 04:46 AM     03/18/2023 04:46 AM    MPV 9.8 03/18/2023 04:46 AM     Lab Results   Component Value Date/Time     03/18/2023 04:46 AM    K 4.0 03/18/2023 04:46 AM    K 4.0 12/09/2022 04:29 PM     03/18/2023 04:46 AM    CO2 22 03/18/2023 04:46 AM    BUN 28 03/18/2023 04:46 AM    CREATININE 1.1 03/18/2023 04:46 AM    CALCIUM 8.3 03/18/2023 04:46 AM    PROT 5.0 03/18/2023 04:46 AM    LABALBU 2.6 03/18/2023 04:46 AM    LABALBU 3.9 05/24/2012 02:10 PM    BILITOT 0.7 03/18/2023 04:46 AM    ALKPHOS 100 03/18/2023 04:46 AM    AST 19
Patient came to special procedures to have Midline catheter placement. Unable to place midline by   in RUE X 4 attempts in two different veins. LUE looked at with ultrasound no veins able to be accessed. Folded 4 x 4 and tegaderm applied to MANSOOR PRECIADO.       Nurse to nurse given to Danielle Morrison nurse notified of above information    Patient transported back to the floor
Patient's status discussed with . Decided at this time that she will be no compressions as well as no escalation from this point. This RN and Dr. Cayla Worley present for discussion.
Pharmacist Review and Automatic Dose Adjustment of Extended Antibiotic Infusions    The reviewing pharmacist has made an adjustment to the ordered meropenem dose per the approved [facility] protocol and table as identified below. Dr. Chun Epps DO, Darsuzi Veronica is a 80 y.o. female. Renal Function Assessment:    Date Body Weight IBW  Adjusted BW SCr  CrCl Dialysis status   3/21/2023 295 lb (133.8 kg)  Ideal body weight: 57 kg (125 lb 10.6 oz)  Adjusted ideal body weight: 87.7 kg (193 lb 6.4 oz) Serum creatinine: 1.4 mg/dL (H) 03/21/23 1025  Estimated creatinine clearance: 43 mL/min (A) N/a     Estimated Creatinine Clearance: 43 mL/min (A) (based on SCr of 1.4 mg/dL (H)). Recent Labs     03/19/23  0801 03/21/23  1025   CREATININE 1.0 1.4*       Height:   Ht Readings from Last 1 Encounters:   03/16/23 5' 5\" (1.651 m)     Weight:  Wt Readings from Last 1 Encounters:   03/19/23 295 lb (133.8 kg)         Plan: Based upon the patient's weight, antibiotic indication, and renal function, the ordered Meropenem dose of 1000 mg every 8 hours has been changed/converted to 2000 mg every 12 hours.       Thank you,    Elvia Jeronimo, PharmD.  3/21/2023 12:35 PM
Physician Progress Note      Clarisse Mtz  CSN #:                  [id number]  :                       1940  ADMIT DATE:       3/15/2023 4:47 PM  [address] DATE:        3/21/2023 1:15 PM  RESPONDING  PROVIDER #:        Chun Epps DO          QUERY TEXT:    Dear Dr. Jose Younger,    Pt admitted with pneumonia and proctitis . Noted documentation of sepsis on   3/21 progress notes by hospitalist, intensivist, [place] consultant. If possible,   please document in progress notes and discharge summary:    The medical record reflects the following:  Risk Factors: morbid obesity, HTN, CHF, GLENNA, A fib  Clinical Indicators: WBC 25.6-20.6; lactic 3.3-21.9; procalcitonin 0.12-0.42;   3/15 CXR:  Bilateral infiltrate and or atelectasis with increasing volume loss   at the right upper lobe; CT: Possible mild proctitis. ; per 3/21 intensive   note: \"admitted on March 15, 2023 with weakness. Patient was followed up by   the infectious disease team, cardiology, and general surgery for sepsis,   atrial fibrillation, and proctitis respectively. .. [place] Guiles [place] Guiles Severe sepsis with septic   shock\"  Treatment: Telemetry/ICU monitoring, GI, General surgery and ID consults,  CT   scans, CXR, IVF's, IV pressors, intubation, O2, IV Doxycycline, Diflucan,   Flagyl    Thank You,  Casie Saini RN, BSN, CDS  Clinical Documentation Improvement Specialist  [phone]  Options provided:  -- Sepsis confirmed present on admission  -- Sepsis confirmed not present on admission  -- Sepsis ruled out  -- Other - I will add my own diagnosis  -- Disagree - Not applicable / Not valid  -- Disagree - Clinically unable to determine / Unknown  -- Refer to Clinical Documentation Reviewer    PROVIDER RESPONSE TEXT:    The diagnosis of sepsis was confirmed as present on admission.     Query created by: Júnior Agustin on 3/23/2023 12:30 PM      Electronically signed by:  Chun Epps DO 3/23/2023 7:06 PM
Placed on by respiratory per Dr Jacquelyn Ruiz during RRT. 12/6 ordered to help with work of breathing. Saturations are un obtainable at this time. Dr vogt to place oxygen at 40-50% per ABG results. 03/21/23 0723   NIV Type   NIV Started/Stopped On   Equipment Type v60   Mode Bilevel   Mask Type Full face mask   Mask Size Medium   Settings/Measurements   IPAP 12 cmH20   CPAP/EPAP 6 cmH2O   Vt (Measured) 644 mL   Rate Ordered 16   Resp 29   Insp Rise Time (%) 2 %   FiO2  45 %   I Time/ I Time % 0.9 s   Minute Volume (L/min) 14.2 Liters   Mask Leak (lpm) 56 lpm   Comfort Level Fair   Using Accessory Muscles Yes   SpO2   (unable to read, PaO2 on gas is in the 200s.  Dr vogt )2 at 36 - 50%)   CO2 33 mmhg   Alarm Settings   Alarms On Y
Pt transferred to ICU. Will await new orders for OT treat and eval when pt is appropriate. Thank you.    Ashanti De Los Santos, [address]
SPEECH/LANGUAGE PATHOLOGY  CLINICAL ASSESSMENT OF SWALLOWING FUNCTION   and PLAN OF CARE    PATIENT NAME:  Tavia Jenkins  (female)     MRN:  [id number]    :  1940  (80 y.o.)  STATUS:  Inpatient: Room 7813/2636-29    TODAY'S DATE:  3/18/2023  REFERRING PROVIDER:   Chetna Almanza DO  SPECIFIC PROVIDER ORDER: SLP swallowing-dysphagia (IP) Date of order:  3/18/23  REASON FOR REFERRAL: Dysphagia   EVALUATING THERAPIST: FEI Tirado                 RESULTS:    DYSPHAGIA DIAGNOSIS:   Clinical indicators of esophageal motility deficit suspected and limited intake on exam, not able to determine type or severity of dysphagia , GI consult recommended. DIET RECOMMENDATIONS:  Pureed consistency solids (IDDSI level 4) with  thin liquids (IDDSI level 0)     FEEDING RECOMMENDATIONS:     Assistance level:  Set-up is required for all oral intake      Compensatory strategies recommended: Small bites/sips and Alternate solids and liquids      Discussed recommendations with nursing and/or faxed report to referring provider: Yes    SPEECH THERAPY  PLAN OF CARE   The dysphagia POC is established based on physician order, dysphagia diagnosis and results of clinical assessment     Skilled SLP intervention for dysphagia management on acute care up to 5 x per week until goals met, pt plateaus in function and/or discharged from hospital    Conditions Requiring Skilled Therapeutic Intervention for dysphagia:    Patient is performing below functional baseline d/t  current acute condition, respiratory compromise, multiple medications, and/or increased dependency upon caregivers. Specific dysphagia interventions to include:     compensatory swallowing strategies to improve airway protection and swallow function.   Training in positioning for improved integrity of swallow  ongoing mealtime assessment to provide diet modification and compensatory strategy implementation to minimize risk of aspiration associated
Speech Language Pathology      Attempted to complete a Video Swallow Study in conjunction with Radiology this date but was unable to be completed d/t poor positioning of patient and poor image quality. However, a Clinical Swallow Assessment at the bedside was completed. Please refer to report for results and recommendations.     Marti Pardo 49, CCC-SLP/L   Speech Language Pathologist  [id number]
Speech Language Pathology      NAME:  Norris Gaines  :  1940  DATE: 3/20/2023  ROOM:  [facility]/0513-    Attempted ongoing Speech-Language Pathology intervention for therapy. Pt unavailable at this time due to:  [] HOLD per RN/ medical staff d/t medical status   [x] Off unit for testing/ procedure    [] With medical staff   [] Declined intervention  [] Sleeping/ Lethargic   [] Other:     SLP to continue previously established POC and re-attempt as able.           Dehydration [E86.0]  Hypokalemia [E87.6]  Hypomagnesemia [E83.42]  Renal insufficiency [N28.9]  Community acquired bacterial pneumonia [J15.9]  Other fatigue [R53.83]  Pneumonia due to infectious organism, unspecified laterality, unspecified part of lung [J18.9]        Hugo Paredes MSCCC/SLP  Speech Language Pathologist  [id number]
ASSESSMENT/PLAN:  Patient Active Problem List   Diagnosis    Acute renal failure (ARF) (HCC)    Pyelonephritis    Obstructive uropathy    HTN (hypertension)    Hyperlipidemia    Obstructive sleep apnea    Glaucoma    DJD (degenerative joint disease)    Morbid obesity ([address]    Kidney stone    Pneumonia    Atrial fibrillation with rapid ventricular response ([address]    Pneumonia due to organism    Community acquired bacterial pneumonia    Longstanding persistent atrial fibrillation ([address]     1. Abdominal pain/diarrhea  -Colitis versus diverticulitis  -Suspect infectious etiology given significant leukocytosis  -Continue Rocephin and Flagyl  -Negative stool studies thus far including negative C. difficile  -Discontinued oral vancomycin     2. Abnormal appearance of the liver  -Possible cirrhosis per initial imaging  -No evidence of iron overload   -Pending viral hepatitis serology   -Nonelevated AFP   -Abnormal ultrasound but does not appear cirrhotic rather with steatosis  -Consider repeat ultrasound with elastography versus FibroSure testing as outpatient  -Unremarkable liver profile     3. History of diverticular disease  -See above     4. Dysphagia  -Patient seen/evaluated by ST  -Described esophageal dysphagia  -No evidence of oropharyngeal dysphagia per speech therapy notes  -Plan for EGD/dilation when condition permits. 5.  Comorbidities  -UTI, pneumonia  -Per admitting / consultants     Patient appears more hemodynamically stable however still in atrial fibrillation with ventricular response over 100 bpm.  Patient also still hypotensive however improving. Repeat KUB and depending on findings attempt clear liquid diet. Consider EGD when condition more stable possibly tomorrow. Real Gandara MD  3/20/2023  11:32 AM    NOTE:  This report was transcribed using voice recognition software.   Every effort was made to ensure accuracy; however, inadvertent computerized transcription errors may be
Date/Time    AMYLASE 32 03/27/2012 12:50 PM         ASSESSMENT/PLAN:  Patient Active Problem List   Diagnosis    Acute renal failure (ARF) (HCC)    Pyelonephritis    Obstructive uropathy    HTN (hypertension)    Hyperlipidemia    Obstructive sleep apnea    Glaucoma    DJD (degenerative joint disease)    Morbid obesity ([address]    Kidney stone    Pneumonia    Atrial fibrillation with rapid ventricular response [address]    Pneumonia due to organism    Community acquired bacterial pneumonia         Assessment / Plan:    1. Abdominal pain/diarrhea  -Colitis versus diverticulitis  -Suspect infectious etiology given significant leukocytosis  -Continue Rocephin and Flagyl  -Negative stool studies thus far including negative C. difficile  -Discontinued oral vancomycin     2. Abnormal appearance of the liver  -Possible cirrhosis per initial imaging  -No evidence of iron overload   -Pending viral hepatitis serology   -Pending AFP   -Abnormal ultrasound but does not appear cirrhotic rather with steatosis  -Consider repeat ultrasound with elastography versus FibroSure testing as outpatient  -LFTs in normal range     3. History of diverticular disease  -See above      EZEKIEL Chavez - CNP  3/18/2023  6:55 AM    NOTE:  This report was transcribed using voice recognition software. Every effort was made to ensure accuracy; however, inadvertent computerized transcription errors may be present.
Medical History:   Diagnosis Date    Cataract     right eye    Cholecystitis with cholelithiasis     Diverticulitis     Diverticulosis     DJD (degenerative joint disease)     DVT (deep venous thrombosis) (HCC)     RLE    Fibroids     Glaucoma     bilateral    HTN (hypertension)     Hyperlipidemia     states on med for precaution    Morbid obesity ([address]     GLENNA on CPAP     Osteoarthritis     PE (pulmonary embolism)     Renal cyst     right     Past Surgical History:   Procedure Laterality Date    CATARACT REMOVAL WITH IMPLANT  12/2011    left eye    CHOLECYSTECTOMY, LAPAROSCOPIC  1995    COLONOSCOPY      CYSTOSCOPY  06/2012    cystoscopy, right ureteroscopy with retrograde pyelograms, stone manipulation, stent insertion, laser lithotripsy with holmium laser    HYSTERECTOMY (CERVIX STATUS UNKNOWN)      total    AR FSTLJ SCLERA GLAUCOMA TRABECULECT AB EXTERNO Right 11/15/2018    RIGHT EYE REVISION OF XEN EYE STENT, TRABECULECTOMY performed by Aliya Granados MD at [facility] (CERVIX REMOVED)  [facility]    for RLE DVT       SUBJECTIVE:    Precautions: Up with assistance, falls , contact- cdiff rule out, fearful of falling,     Social history: Patient lives with spouse ( pt reports  does not help her with ADLs or function) in a duplex  with Ramp  to enter home. Sponge bathes but has walk in shower .       Equipment owned: Wheelchair, [address], [address] [facility] [address] [facility] [employer] chair, and Electric wheelchair    AM-PAC Basic Mobility       AM-PAC Basic Mobility - Inpatient   How much help is needed turning from your back to your side while in a flat bed without using bedrails?: A Lot  How much help is needed moving from lying on your back to sitting on the side of a flat bed without using bedrails?: A Lot  How much help is needed moving to and from a bed to a chair?: Total  How much help is needed standing up from a chair using your arms?: Total  How
zinc gluconate 50 MG tablet Take 50 mg by mouth daily    Historical Provider, MD   Boswellia-Glucosamine-Vit D (OSTEO BI-FLEX ONE PER DAY PO) Take 1 tablet by mouth daily    Historical Provider, MD   sodium chloride (CANDY 128) 5 % ophthalmic solution Place 1 drop into both eyes 4 times daily    Historical Provider, MD   latanoprost (XALATAN) 0.005 % ophthalmic solution Place 1 drop into the left eye nightly    Historical Provider, MD   dorzolamide (TRUSOPT) 2 % ophthalmic solution Place 1 drop into the left eye in the morning and at bedtime    Historical Provider, MD   brimonidine (ALPHAGAN) 0.2 % ophthalmic solution Place 1 drop into both eyes in the morning and at bedtime    Historical Provider, MD   olopatadine (PATADAY) 0.2 % SOLN ophthalmic solution Place 1 drop into both eyes daily  Patient not taking: Reported on 3/16/2023    Historical Provider, MD   levothyroxine (SYNTHROID) 88 MCG tablet Take 1 tablet by mouth Daily 4/24/19   Violet Vega,    pantoprazole (PROTONIX) 40 MG tablet Take 1 tablet by mouth every morning (before breakfast) 4/24/19   Violet Vega, DO   oxybutynin (DITROPAN) 5 MG tablet Take 1 tablet by mouth 2 times daily 4/23/19   Violet Vega, DO   hydrocortisone 2.5 % cream Apply topically 2 times daily. Patient taking differently: Apply 1 application topically 2 times daily Apply topically 2 times daily to where skin rash noted 4/23/19   Violet Vega, DO   Psyllium (METAMUCIL FIBER PO) Take by mouth 2 times daily    Historical Provider, MD   aspirin 81 MG EC tablet Take 81 mg by mouth daily Pt instructed to check hold with dr. Theresa Kelly Provider, MD   [facility] OIL Take 1,000 mg by mouth daily Ld 11/9/2018  Patient not taking: Reported on 11/28/2022    Historical Provider, MD   Docusate Sodium (DOCULASE PO) Take 100 mg by mouth in the morning and at bedtime    Historical Provider, MD   amlodipine (NORVASC) 2.5 MG tablet Take 1 tablet by mouth daily.  5/9/12   Violet Vega, DO
Edema:  yes bilateral lower extremities   Endurance: poor     Vitals: room air   Blood Pressure at rest 103/68 supine HOB slightly elevated Blood Pressure during session    Heart Rate at rest 105 Heart Rate during session    SPO2 at rest %  SPO2 during session %     Patient education  Patient educated on role of Physical Therapy, risks of immobility, safety and plan of care,  importance of mobility while in hospital , ankle pumps, quad set and glut set for edema control, blood clot prevention, and safety      Patient response to education:   Pt verbalized understanding Pt demonstrated skill Pt requires further education in this area   Yes Partial Yes      Treatment:  Patient practiced and was instructed/facilitated in the following treatment: Patient performed supine exercises. Repositioned for comfort and skin integrity. Bilateral heel protectors donned. Therapeutic Exercises:  ankle pumps, heel raises, hip abduction/adduction, straight leg raise, and SAQ   x 10-15 reps. At end of session, patient in bed with     call light and phone within reach,  all lines and tubes intact, nursing notified. Patient would benefit from continued skilled Physical Therapy to improve functional independence and quality of life.          Patient's/ family goals   rehab    Time in  1126  Time out  1141    Total Treatment Time  15 minutes      CPT codes:    Therapeutic exercises (06697)   15 minutes  1 unit(s)    Cherrie Osorio, PT
Strength BUE:  refer to OT eval  RLE:  3-/5  LLE:  3-/5  Increase strength in affected mm groups by 1/3 grade   Balance Sitting EOB:  fair min/mod assist to remain upright   Dynamic Standing:  not assessed  Sitting EOB: not assessed   Dynamic Standing: not assessed    Sitting EOB:  good   Dynamic Standing: fair      Patient is Alert & Oriented x person, place, time, and situation and follows directions    Sensation:  Patient  denies numbness/tingling   Edema:  yes bilateral lower extremities   Endurance: poor     Vitals: room air   Blood Pressure at rest 103/68 supine HOB slightly elevated Blood Pressure during session    Heart Rate at rest 105 Heart Rate during session    SPO2 at rest %  SPO2 during session %     Patient education  Patient educated on role of Physical Therapy, risks of immobility, safety and plan of care,  importance of mobility while in hospital , ankle pumps, quad set and glut set for edema control, blood clot prevention, and safety      Patient response to education:   Pt verbalized understanding Pt demonstrated skill Pt requires further education in this area   Yes Partial Yes      Treatment:  Patient practiced and was instructed/facilitated in the following treatment: Patient performed supine exercise. Attempted to sit at  edge of bed. Needing to be assisted back to bed d/t fall risk and to [facility]. Repositioned for comfort and skin integrity. Bilateral heel protectors donned. Therapeutic Exercises:  ankle pumps, heel slide, straight leg raise, shoulder flexion, and elbow flexion/extension  x 10 reps. At end of session, patient in bed with     call light and phone within reach,  all lines and tubes intact, nursing notified. Patient would benefit from continued skilled Physical Therapy to improve functional independence and quality of life.          Patient's/ family goals   rehab    Time in  1059  Time out  1126    Total Treatment Time  27 minutes      CPT codes:    Therapeutic
Temp 97.9 °F (36.6 °C) (Axillary)   Resp 26   Ht 5' 5\" (1.651 m)   Wt 295 lb (133.8 kg)   SpO2 98%   BMI 49.09 kg/m²     Physical Exam:     General: Alert ill-appearing elderly lady in no acute distress,   Head and neck : PERRLA, EOMI . Sclera non icteric. Oropharynx : Clear  Neck: no JVD,  no adenopathy  Heart: Tachycardic  Lungs: Diminished breath sounds at the bases  Extremities: 3+ edema  Abdomen: Soft, non-tender;no masses, no organomegaly  Neurologic:Cranial nerves grossly intact. No focal motor or sensory deficits . Recent Laboratory Data-   Lab Results   Component Value Date    WBC 20.6 (H) 03/19/2023    HGB 10.7 (L) 03/19/2023    HCT 34.8 03/19/2023    MCV 90.7 03/19/2023     03/19/2023    LYMPHOPCT 14.0 (L) 03/19/2023    RBC 3.56 03/19/2023    MCH 30.1 03/19/2023    MCHC 33.1 03/19/2023    RDW 17.8 (H) 03/19/2023    NEUTOPHILPCT 80.0 03/19/2023    MONOPCT 4.0 03/19/2023    BASOPCT 0.0 03/19/2023    NEUTROABS 16.89 (H) 03/19/2023    LYMPHSABS 2.88 03/19/2023    MONOSABS 0.82 03/19/2023    EOSABS 0.00 (L) 03/19/2023    BASOSABS 0.00 03/19/2023       Lab Results   Component Value Date     03/19/2023    K 4.2 03/19/2023     (H) 03/19/2023    CO2 20 (L) 03/19/2023    BUN 29 (H) 03/19/2023    CREATININE 1.0 03/19/2023    GLUCOSE 113 (H) 03/19/2023    CALCIUM 8.3 (L) 03/19/2023    PROT 4.7 (L) 03/19/2023    LABALBU 2.5 (L) 03/19/2023    BILITOT 0.7 03/19/2023    ALKPHOS 83 03/19/2023    AST 16 03/19/2023    ALT 8 03/19/2023    LABGLOM 56 03/19/2023    GFRAA >60 04/23/2019       Lab Results   Component Value Date    IRON 141 03/19/2023    TIBC 153 (L) 03/19/2023    FERRITIN 1,178 03/19/2023           Radiology-    CT CHEST WO CONTRAST    Result Date: 3/18/2023  EXAMINATION: CT OF THE CHEST WITHOUT CONTRAST 3/18/2023 12:39 pm TECHNIQUE: CT of the chest was performed without the administration of intravenous contrast. Multiplanar reformatted images are provided for review.  Automated
diagnosis, prognosis/goals and OT plan of care. Demonstrated fair understanding. Eval Complexity: Low    Time In: 10:55 AM   Time Out: 11:10 AM    Total Treatment Time: 0       Min Units   OT Eval Low 97165  X  1    OT Eval Medium 21799      OT Eval High 48320      OT Re-Eval [id number]            ADL/Self Care 00995     Therapeutic Activities 10713       Therapeutic Ex 31563       Orthotic Management 56380       Manual [id number]     Neuro Re-Ed 85393       Non-Billable Time        Evaluation Time additionally includes thorough review of current medical information, gathering information on past medical history/social history and prior level of function, interpretation of standardized testing/informal observation of tasks, assessment of data and development of plan of care and goals.         Evaluating OT: Geremias Reis OTR/L #[id number]
II-XII are grossly intact. SKIN:  normal skin color, texture, turgor and no rashes  Lines:           Peripherally Inserted Central Catheter:  Extended Dwell Peripherial IV 03/20/23 Left Brachial (Active)   Criteria for Appropriate Use Limited/no vessel suitable for conventional peripheral access 03/20/23 1540   Site Assessment Clean, dry & intact 03/20/23 1540   Phlebitis Assessment No symptoms 03/20/23 1540   Infiltration Assessment 0 03/20/23 1540   Extremity Circumference (cm) 35 cm 03/20/23 1540   External Catheter Length (cm) 0 cm 03/20/23 1540   Line Status Blood return noted; Flushed;Normal saline locked;Specimen collected 03/20/23 1540   Date of Last Dressing Change 03/20/23 03/20/23 1540   Dressing Type Antimicrobial;Transparent w/CHG gel;Securing device 03/20/23 1540   Dressing Status New dressing applied 03/20/23 1540   Dressing Intervention New 03/20/23 1540           Peripheral Intravenous Line:  Peripheral IV 03/15/23 Left; Lower Forearm (Active)   Site Assessment Clean, dry & intact 03/19/23 2042   Line Status Infusing 03/19/23 2042   Line Care Connections checked and tightened 03/19/23 2042   Phlebitis Assessment Erythema at access site with or without pain 03/19/23 1630   Infiltration Assessment 0 03/19/23 2042   Dressing Status Clean, dry & intact 03/19/23 2042   Dressing Type Transparent 03/19/23 2042           Drain(s):  Urinary Catheter 03/20/23 (Active)   Catheter Indications Need for fluid volume management of the critically ill patient in a critical care setting 03/21/23 0730   Site Assessment Pink 03/21/23 0730   Urine Color Tena 03/21/23 0730   Catheter Care  Soap and water 03/20/23 0804   Output (mL) 150 mL 03/21/23 0036     Airway:  ETT  (Active)   $ Intubation Emergent  $ Yes 03/21/23 0900   Secured At 24 cm 03/15/23 0001   Measured From Lips 03/15/23 0001          DIAGNOSTIC RESULTS     Recent Labs     03/19/23  0801 03/19/23  1429   WBC 20.6*  --    RBC 3.56  --    HGB 10.7*  --
03/18/2023 04:46 AM    EOSABS 0.22 03/18/2023 04:46 AM    BASOSABS 0.00 03/18/2023 04:46 AM     CMP:    Lab Results   Component Value Date/Time     03/18/2023 04:46 AM    K 4.0 03/18/2023 04:46 AM    K 4.0 12/09/2022 04:29 PM     03/18/2023 04:46 AM    CO2 22 03/18/2023 04:46 AM    BUN 28 03/18/2023 04:46 AM    CREATININE 1.1 03/18/2023 04:46 AM    GFRAA >60 04/23/2019 06:15 AM    LABGLOM 50 03/18/2023 04:46 AM    GLUCOSE 91 03/18/2023 04:46 AM    GLUCOSE 105 05/24/2012 02:10 PM    PROT 5.0 03/18/2023 04:46 AM    LABALBU 2.6 03/18/2023 04:46 AM    LABALBU 3.9 05/24/2012 02:10 PM    CALCIUM 8.3 03/18/2023 04:46 AM    BILITOT 0.7 03/18/2023 04:46 AM    ALKPHOS 100 03/18/2023 04:46 AM    AST 19 03/18/2023 04:46 AM    ALT 8 03/18/2023 04:46 AM       ASSESSMENT/PLAN:  Pnuemonia   Proctitis  Paroxysmal atrial fibrillation with RVR  Generalized weakness and fatigue with ambulatory dysfunction  Elevated troponin. Mild aortic stenosis  Pulmonary hypertension  History of DVT. Obstructive sleep apnea  History of glaucoma bilateral  IVC filter in place  Hyperlipidemia  Hypertension  History of tobacco abuse  Echocardiogram November 2022 with mild aortic stenosis, trace mitral regurg, EF 64% with LVH and enlargement of left atrium. Patient presented due to increased weakness. Patient had recently been discharged from nursing facility at home. However she was unable to care for self at home. She complains of increased weakness. She was found to have possible pneumonia. CT abdomen and pelvis showed proctitis. Blood cultures ordered. Patient placed on IV antibiotics. PT OT consulted. Home medication reviewed  Consult GI regarding left lower quadrant abdominal pain and patient's complaints of diarrhea.     Lopressor 2.5 mg IV push x2--atrial for with RVR 3/17  Increase atenolol 50 mg p.o. daily  Hold amlodipine  Hold clonidine patch if systolic less than 168    CT of the chest without
Differential:    Lab Results   Component Value Date/Time    WBC 20.6 03/19/2023 08:01 AM    RBC 3.56 03/19/2023 08:01 AM    HGB 10.7 03/19/2023 08:01 AM    HCT 34.8 03/19/2023 02:29 PM     03/19/2023 08:01 AM    MCV 90.7 03/19/2023 08:01 AM    MCH 30.1 03/19/2023 08:01 AM    MCHC 33.1 03/19/2023 08:01 AM    RDW 17.8 03/19/2023 08:01 AM    NRBC 1.0 03/18/2023 04:46 AM    SEGSPCT 55 05/24/2012 02:10 PM    METASPCT 1.0 03/19/2023 08:01 AM    LYMPHOPCT 14.0 03/19/2023 08:01 AM    MONOPCT 4.0 03/19/2023 08:01 AM    MYELOPCT 1.0 03/19/2023 08:01 AM    BASOPCT 0.0 03/19/2023 08:01 AM    MONOSABS 0.82 03/19/2023 08:01 AM    LYMPHSABS 2.88 03/19/2023 08:01 AM    EOSABS 0.00 03/19/2023 08:01 AM    BASOSABS 0.00 03/19/2023 08:01 AM     CMP:    Lab Results   Component Value Date/Time     03/19/2023 08:01 AM    K 4.2 03/19/2023 08:01 AM    K 4.0 12/09/2022 04:29 PM     03/19/2023 08:01 AM    CO2 20 03/19/2023 08:01 AM    BUN 29 03/19/2023 08:01 AM    CREATININE 1.0 03/19/2023 08:01 AM    GFRAA >60 04/23/2019 06:15 AM    LABGLOM 56 03/19/2023 08:01 AM    GLUCOSE 113 03/19/2023 08:01 AM    GLUCOSE 105 05/24/2012 02:10 PM    PROT 4.7 03/19/2023 08:01 AM    LABALBU 2.5 03/19/2023 08:01 AM    LABALBU 3.9 05/24/2012 02:10 PM    CALCIUM 8.3 03/19/2023 08:01 AM    BILITOT 0.7 03/19/2023 08:01 AM    ALKPHOS 83 03/19/2023 08:01 AM    AST 16 03/19/2023 08:01 AM    ALT 8 03/19/2023 08:01 AM       ASSESSMENT/PLAN:  Pnuemonia   Proctitis  Paroxysmal atrial fibrillation with RVR  Generalized weakness and fatigue with ambulatory dysfunction  Elevated troponin. Mild aortic stenosis  Pulmonary hypertension  History of DVT. Obstructive sleep apnea  History of glaucoma bilateral  IVC filter in place  Hyperlipidemia  Hypertension  History of tobacco abuse  Echocardiogram November 2022 with mild aortic stenosis, trace mitral regurg, EF 64% with LVH and enlargement of left atrium. Patient presented due to increased weakness.
PROVIDED HISTORY: Reason for exam:->abdominal pain Additional Contrast?->Radiologist Recommendation FINDINGS: LOWER CHEST: Small right and minimal left pleural effusion and atelectatic changes within the right lower lobe. KIDNEYS AND URINARY TRACT: 5 mm nonobstructing stone midpole of left kidney. There is no evidence for hydronephrosis. The ureters are of normal course and caliber. Simple cyst of the bilateral kidneys. No further follow-up for these lesions are acquired. 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 This lesion has both intrarenal and extrarenal component. ORGANS: Fatty liver. Status post cholecystectomy. Visualized portions of the liver, spleen, pancreas, and adrenal glands demonstrate no acute abnormality. GI/BOWEL: No bowel obstruction. No evidence of acute appendicitis. Large bowel loops are fluid-filled. Finding may be related to diarrheal disease. Dana Critchley PELVIS: The bladder and pelvic organs are unremarkable. PERITONEUM/RETROPERITONEUM: No free air or free fluid is noted. No pathologically enlarged lymphadenopathy. The vasculature do not demonstrate acute abnormality. IVC filter is in place. BONES/SOFT TISSUES: The osseous structures demonstrate no acute abnormality. No acute pathology within the abdomen and pelvis. Small right and minimal left pleural effusion with consolidative changes of right lower lobe suggestive of atelectasis. Large bowel loops are fluid-filled. Finding may be related to diarrheal disease. . 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 This lesion has both intrarenal and extrarenal component. Fatty liver.      CT ABDOMEN PELVIS W IV CONTRAST Additional Contrast? Radiologist Recommendation    Result Date: 3/16/2023  EXAMINATION: CT OF THE ABDOMEN AND PELVIS WITH CONTRAST 3/15/2023 11:12 pm TECHNIQUE: CT of the abdomen and pelvis was performed with the

## 2023-03-24 LAB — BACTERIA BLD CULT: NORMAL

## 2023-03-25 LAB — BACTERIA BLD CULT ORG #2: NORMAL

## 2025-05-06 NOTE — H&P
Department of Internal Medicine  History and Physical    PCP: Mara Sellers DO  Admitting Physician: Dr. Garcia Guardian  Consultants:   Date of Service: 3/15/2023    CHIEF COMPLAINT: Weakness    HISTORY OF PRESENT ILLNESS:    Patient is a 1year-old female who presented to the ED due to weakness. Patient was recently discharged home where she lives with her . .  However at home she has been extremely weak and unable to care for self. She normally uses a walker. However she is having trouble with ambulation. She does admit to mid abdominal pain for the last 2 days. She does admit to loose stools primarily after meals. She denies any falls. She denies any chest pain or shortness of breath. She denies any fever or chills.     PAST MEDICAL Hx:  Past Medical History:   Diagnosis Date    Cataract     right eye    Cholecystitis with cholelithiasis     Diverticulitis     Diverticulosis     DJD (degenerative joint disease)     DVT (deep venous thrombosis) (HCC)     RLE    Fibroids     Glaucoma     HTN (hypertension)     Hyperlipidemia     states on med for precaution    Morbid obesity ([address]     GLENNA on CPAP     Osteoarthritis     PE (pulmonary embolism)     Renal cyst     right       PAST SURGICAL Hx:   Past Surgical History:   Procedure Laterality Date    CATARACT REMOVAL WITH IMPLANT  12/2011    left eye    CHOLECYSTECTOMY, [address]    COLONOSCOPY      CYSTOSCOPY  jun 2012    cystoscopy, right ureteroscopy with retrograde pyelograms, stone manipulation, stent insertion, laser lithotripsy with holmium laser    HYSTERECTOMY (CERVIX STATUS UNKNOWN)      TX GLAUCOMA SURG,TRABECU AB EXTERNO Right 11/15/2018    RIGHT EYE REVISION OF XEN EYE STENT, TRABECULECTOMY performed by Eloisa Jones MD at [facility] (CERVIX REMOVED)  [facility]    for RLE DVT       FAMILY Hx:  Family History   Problem Relation Age of Onset    Cancer Brother declines

## (undated) DEVICE — COVER MICROSCOPE KNOB SM

## (undated) DEVICE — 40436 HEAD REST OCULAR: Brand: 40436 HEAD REST OCULAR

## (undated) DEVICE — NEEDLE RETROBLB 25GA L38MM STR SHFT DISP ATKNSN

## (undated) DEVICE — NEEDLE FLTR 18GA L1.5IN MEM THK5UM BLNT DISP

## (undated) DEVICE — PAD,EYE,1-5/8X2 5/8,STERILE,LF,1/PK: Brand: MEDLINE

## (undated) DEVICE — SHIELD EYE W3XL2.5IN UNIV CLR PLAS LTWT

## (undated) DEVICE — SPONGE EYE L7CM NAT CELOS SPEAR VISITEC VISISPEAR

## (undated) DEVICE — SYRINGE, LUER LOCK, 10ML: Brand: MEDLINE

## (undated) DEVICE — SYRINGE, LUER LOCK, 5ML: Brand: MEDLINE

## (undated) DEVICE — SCALPEL 15 DEG NO 715

## (undated) DEVICE — PACK PROCEDURE SURG SURG CATARACT CUSTOM

## (undated) DEVICE — EYE EXTRAS

## (undated) DEVICE — PROBE HEMSTAT 18GA ERAS BVL TIP STR BPLR WET-FIELD

## (undated) DEVICE — SOLUTION IV IRRIG WATER 1000ML POUR BRL 2F7114

## (undated) DEVICE — SOLUTION IRRIGATION BAL SALT SOLUTION 15 ML STRL BSS

## (undated) DEVICE — 1 ML TUBERCULIN SYRINGE,DETACHABLE NEEDLE: Brand: MONOJECT

## (undated) DEVICE — GLASSES SAFETY PROTCT GRN

## (undated) DEVICE — NEEDLE HYPO 30GA L0.5IN BGE POLYPR HUB S STL REG BVL STR